# Patient Record
Sex: FEMALE | Race: OTHER | HISPANIC OR LATINO | URBAN - METROPOLITAN AREA
[De-identification: names, ages, dates, MRNs, and addresses within clinical notes are randomized per-mention and may not be internally consistent; named-entity substitution may affect disease eponyms.]

---

## 2020-04-22 ENCOUNTER — EMERGENCY (EMERGENCY)
Facility: HOSPITAL | Age: 30
LOS: 1 days | Discharge: HOME | End: 2020-04-22
Attending: EMERGENCY MEDICINE | Admitting: EMERGENCY MEDICINE
Payer: MEDICAID

## 2020-04-22 VITALS
SYSTOLIC BLOOD PRESSURE: 115 MMHG | RESPIRATION RATE: 17 BRPM | TEMPERATURE: 100 F | HEART RATE: 109 BPM | DIASTOLIC BLOOD PRESSURE: 64 MMHG | OXYGEN SATURATION: 100 %

## 2020-04-22 VITALS — TEMPERATURE: 103 F

## 2020-04-22 LAB
ALBUMIN SERPL ELPH-MCNC: 3.7 G/DL — SIGNIFICANT CHANGE UP (ref 3.3–5)
ALP SERPL-CCNC: 114 U/L — SIGNIFICANT CHANGE UP (ref 40–120)
ALT FLD-CCNC: 28 U/L — SIGNIFICANT CHANGE UP (ref 4–33)
ANION GAP SERPL CALC-SCNC: 14 MMO/L — SIGNIFICANT CHANGE UP (ref 7–14)
AST SERPL-CCNC: 18 U/L — SIGNIFICANT CHANGE UP (ref 4–32)
BASE EXCESS BLDV CALC-SCNC: -0.7 MMOL/L — SIGNIFICANT CHANGE UP
BILIRUB SERPL-MCNC: < 0.2 MG/DL — LOW (ref 0.2–1.2)
BLOOD GAS VENOUS - CREATININE: 0.62 MG/DL — SIGNIFICANT CHANGE UP (ref 0.5–1.3)
BUN SERPL-MCNC: 11 MG/DL — SIGNIFICANT CHANGE UP (ref 7–23)
CALCIUM SERPL-MCNC: 9.1 MG/DL — SIGNIFICANT CHANGE UP (ref 8.4–10.5)
CHLORIDE BLDV-SCNC: 106 MMOL/L — SIGNIFICANT CHANGE UP (ref 96–108)
CHLORIDE SERPL-SCNC: 103 MMOL/L — SIGNIFICANT CHANGE UP (ref 98–107)
CO2 SERPL-SCNC: 20 MMOL/L — LOW (ref 22–31)
CREAT SERPL-MCNC: 0.55 MG/DL — SIGNIFICANT CHANGE UP (ref 0.5–1.3)
GAS PNL BLDV: 136 MMOL/L — SIGNIFICANT CHANGE UP (ref 136–146)
GLUCOSE BLDV-MCNC: 115 MG/DL — HIGH (ref 70–99)
GLUCOSE SERPL-MCNC: 118 MG/DL — HIGH (ref 70–99)
HCO3 BLDV-SCNC: 22 MMOL/L — SIGNIFICANT CHANGE UP (ref 20–27)
HCT VFR BLDV CALC: 35.9 % — SIGNIFICANT CHANGE UP (ref 34.5–45)
HGB BLDV-MCNC: 11.7 G/DL — SIGNIFICANT CHANGE UP (ref 11.5–15.5)
LACTATE BLDV-MCNC: 1.2 MMOL/L — SIGNIFICANT CHANGE UP (ref 0.5–2)
LIDOCAIN IGE QN: 28.8 U/L — SIGNIFICANT CHANGE UP (ref 7–60)
PCO2 BLDV: 42 MMHG — SIGNIFICANT CHANGE UP (ref 41–51)
PH BLDV: 7.37 PH — SIGNIFICANT CHANGE UP (ref 7.32–7.43)
PO2 BLDV: < 24 MMHG — LOW (ref 35–40)
POTASSIUM BLDV-SCNC: 3.4 MMOL/L — SIGNIFICANT CHANGE UP (ref 3.4–4.5)
POTASSIUM SERPL-MCNC: 3.8 MMOL/L — SIGNIFICANT CHANGE UP (ref 3.5–5.3)
POTASSIUM SERPL-SCNC: 3.8 MMOL/L — SIGNIFICANT CHANGE UP (ref 3.5–5.3)
PROT SERPL-MCNC: 7.4 G/DL — SIGNIFICANT CHANGE UP (ref 6–8.3)
SAO2 % BLDV: 28.9 % — LOW (ref 60–85)
SODIUM SERPL-SCNC: 137 MMOL/L — SIGNIFICANT CHANGE UP (ref 135–145)

## 2020-04-22 PROCEDURE — 99285 EMERGENCY DEPT VISIT HI MDM: CPT

## 2020-04-22 PROCEDURE — 76770 US EXAM ABDO BACK WALL COMP: CPT | Mod: 26

## 2020-04-22 RX ORDER — SODIUM CHLORIDE 9 MG/ML
500 INJECTION, SOLUTION INTRAVENOUS ONCE
Refills: 0 | Status: COMPLETED | OUTPATIENT
Start: 2020-04-22 | End: 2020-04-22

## 2020-04-22 RX ORDER — MORPHINE SULFATE 50 MG/1
4 CAPSULE, EXTENDED RELEASE ORAL ONCE
Refills: 0 | Status: DISCONTINUED | OUTPATIENT
Start: 2020-04-22 | End: 2020-04-22

## 2020-04-22 RX ORDER — ACETAMINOPHEN 500 MG
975 TABLET ORAL ONCE
Refills: 0 | Status: COMPLETED | OUTPATIENT
Start: 2020-04-22 | End: 2020-04-22

## 2020-04-22 RX ADMIN — Medication 975 MILLIGRAM(S): at 22:16

## 2020-04-22 RX ADMIN — MORPHINE SULFATE 4 MILLIGRAM(S): 50 CAPSULE, EXTENDED RELEASE ORAL at 22:16

## 2020-04-22 RX ADMIN — SODIUM CHLORIDE 250 MILLILITER(S): 9 INJECTION, SOLUTION INTRAVENOUS at 22:16

## 2020-04-22 NOTE — ED ADULT NURSE NOTE - OBJECTIVE STATEMENT
Pt received to room 10 complaining of left rib pain. AxOx4 and ambulatory. Pt primarily Panamanian speaking. Pt states she is 29 weeks pregnant. Respirations even and unlabored. Pt febrile at this time. MD made aware. Labs drawn and sent. Pt to be transported to L&D. Will continue to monitor.

## 2020-04-22 NOTE — ED PROVIDER NOTE - CLINICAL SUMMARY MEDICAL DECISION MAKING FREE TEXT BOX
29F 29wks pregnant p/w 9/10 L flank and abdominal pain x 2 hrs. Pt appears uncomfortable, mildly tachycardic and febrile in triage. Nontoxic appearing. Lungs clear to ascultation, abdomen distended appropriately with some tenderness in aforementioned area. Pyelo vs renal stone vs pregnancy complication, plan - labs, urine, renal/bladder US, pain control, fluids, likely OB for formal TV US study.

## 2020-04-22 NOTE — ED PROVIDER NOTE - OBJECTIVE STATEMENT
29 Turkish speaking female,  29 weeks pregnant otherwise healthy, presenting with constant, abrupt onset, 9/10 pain in her L flank that radiates to her L abdomen and groin area x 2 hours. Pt says that for the last few days she's had a less severe but similar sensation on the R side. Pt had an OB ultrasound "about a month ago" that was "normal". Pt does not currently follow with an obsetrician. Denies fevers, cough, sob, chest pain, dysuria, hematuria, hx of renal stones, n/v/d, sick contacts, leg swelling, smoking.

## 2020-04-22 NOTE — ED ADULT TRIAGE NOTE - CHIEF COMPLAINT QUOTE
Patient 29 weeks pregnant c/o sharp left rib pain that radiates to lower back starting today. LMP 10/1. EDGAR 7/8. Denies any past medical history. L&D called patient to be seen in ED. 100.3F temp in triage. Patient appears uncomfortable.

## 2020-04-23 ENCOUNTER — INPATIENT (INPATIENT)
Facility: HOSPITAL | Age: 30
LOS: 4 days | Discharge: HOME | End: 2020-04-28
Attending: SPECIALIST | Admitting: SPECIALIST
Payer: MEDICAID

## 2020-04-23 VITALS
RESPIRATION RATE: 16 BRPM | SYSTOLIC BLOOD PRESSURE: 113 MMHG | TEMPERATURE: 102 F | DIASTOLIC BLOOD PRESSURE: 57 MMHG | HEART RATE: 105 BPM

## 2020-04-23 DIAGNOSIS — O26.899 OTHER SPECIFIED PREGNANCY RELATED CONDITIONS, UNSPECIFIED TRIMESTER: ICD-10-CM

## 2020-04-23 DIAGNOSIS — N20.0 CALCULUS OF KIDNEY: ICD-10-CM

## 2020-04-23 DIAGNOSIS — Z3A.00 WEEKS OF GESTATION OF PREGNANCY NOT SPECIFIED: ICD-10-CM

## 2020-04-23 LAB
ALBUMIN SERPL ELPH-MCNC: 3.3 G/DL — SIGNIFICANT CHANGE UP (ref 3.3–5)
ALP SERPL-CCNC: 112 U/L — SIGNIFICANT CHANGE UP (ref 40–120)
ALT FLD-CCNC: 26 U/L — SIGNIFICANT CHANGE UP (ref 4–33)
ANION GAP SERPL CALC-SCNC: 13 MMO/L — SIGNIFICANT CHANGE UP (ref 7–14)
APPEARANCE UR: SIGNIFICANT CHANGE UP
APTT BLD: 28.8 SEC — SIGNIFICANT CHANGE UP (ref 27.5–36.3)
AST SERPL-CCNC: 22 U/L — SIGNIFICANT CHANGE UP (ref 4–32)
BACTERIA # UR AUTO: HIGH
BASOPHILS # BLD AUTO: 0.02 K/UL — SIGNIFICANT CHANGE UP (ref 0–0.2)
BASOPHILS # BLD AUTO: 0.03 K/UL — SIGNIFICANT CHANGE UP (ref 0–0.2)
BASOPHILS NFR BLD AUTO: 0.1 % — SIGNIFICANT CHANGE UP (ref 0–2)
BASOPHILS NFR BLD AUTO: 0.2 % — SIGNIFICANT CHANGE UP (ref 0–2)
BILIRUB SERPL-MCNC: 0.3 MG/DL — SIGNIFICANT CHANGE UP (ref 0.2–1.2)
BILIRUB UR-MCNC: NEGATIVE — SIGNIFICANT CHANGE UP
BLD GP AB SCN SERPL QL: NEGATIVE — SIGNIFICANT CHANGE UP
BLOOD UR QL VISUAL: NEGATIVE — SIGNIFICANT CHANGE UP
BUN SERPL-MCNC: 6 MG/DL — LOW (ref 7–23)
CALCIUM SERPL-MCNC: 9 MG/DL — SIGNIFICANT CHANGE UP (ref 8.4–10.5)
CHLORIDE SERPL-SCNC: 103 MMOL/L — SIGNIFICANT CHANGE UP (ref 98–107)
CO2 SERPL-SCNC: 20 MMOL/L — LOW (ref 22–31)
COLOR SPEC: SIGNIFICANT CHANGE UP
CREAT SERPL-MCNC: 0.5 MG/DL — SIGNIFICANT CHANGE UP (ref 0.5–1.3)
EOSINOPHIL # BLD AUTO: 0.01 K/UL — SIGNIFICANT CHANGE UP (ref 0–0.5)
EOSINOPHIL # BLD AUTO: 0.1 K/UL — SIGNIFICANT CHANGE UP (ref 0–0.5)
EOSINOPHIL NFR BLD AUTO: 0.1 % — SIGNIFICANT CHANGE UP (ref 0–6)
EOSINOPHIL NFR BLD AUTO: 0.7 % — SIGNIFICANT CHANGE UP (ref 0–6)
GLUCOSE SERPL-MCNC: 103 MG/DL — HIGH (ref 70–99)
GLUCOSE UR-MCNC: 150 — HIGH
HBV SURFACE AG SER-ACNC: NEGATIVE — SIGNIFICANT CHANGE UP
HCT VFR BLD CALC: 30.1 % — LOW (ref 34.5–45)
HCT VFR BLD CALC: 30.8 % — LOW (ref 34.5–45)
HCT VFR BLD CALC: 32.8 % — LOW (ref 34.5–45)
HGB BLD-MCNC: 10.8 G/DL — LOW (ref 11.5–15.5)
HGB BLD-MCNC: 9.9 G/DL — LOW (ref 11.5–15.5)
HGB BLD-MCNC: 9.9 G/DL — LOW (ref 11.5–15.5)
HIV COMBO RESULT: SIGNIFICANT CHANGE UP
HIV1+2 AB SPEC QL: SIGNIFICANT CHANGE UP
HYALINE CASTS # UR AUTO: NEGATIVE — SIGNIFICANT CHANGE UP
IMM GRANULOCYTES NFR BLD AUTO: 0.6 % — SIGNIFICANT CHANGE UP (ref 0–1.5)
IMM GRANULOCYTES NFR BLD AUTO: 0.9 % — SIGNIFICANT CHANGE UP (ref 0–1.5)
INR BLD: 1 — SIGNIFICANT CHANGE UP (ref 0.88–1.17)
KETONES UR-MCNC: NEGATIVE — SIGNIFICANT CHANGE UP
LACTATE SERPL-SCNC: 1.7 MMOL/L — SIGNIFICANT CHANGE UP (ref 0.5–2)
LEUKOCYTE ESTERASE UR-ACNC: SIGNIFICANT CHANGE UP
LYMPHOCYTES # BLD AUTO: 0.47 K/UL — LOW (ref 1–3.3)
LYMPHOCYTES # BLD AUTO: 1.48 K/UL — SIGNIFICANT CHANGE UP (ref 1–3.3)
LYMPHOCYTES # BLD AUTO: 10.1 % — LOW (ref 13–44)
LYMPHOCYTES # BLD AUTO: 3 % — LOW (ref 13–44)
MCHC RBC-ENTMCNC: 31.1 PG — SIGNIFICANT CHANGE UP (ref 27–34)
MCHC RBC-ENTMCNC: 31.4 PG — SIGNIFICANT CHANGE UP (ref 27–34)
MCHC RBC-ENTMCNC: 31.5 PG — SIGNIFICANT CHANGE UP (ref 27–34)
MCHC RBC-ENTMCNC: 32.1 % — SIGNIFICANT CHANGE UP (ref 32–36)
MCHC RBC-ENTMCNC: 32.9 % — SIGNIFICANT CHANGE UP (ref 32–36)
MCHC RBC-ENTMCNC: 32.9 % — SIGNIFICANT CHANGE UP (ref 32–36)
MCV RBC AUTO: 95.3 FL — SIGNIFICANT CHANGE UP (ref 80–100)
MCV RBC AUTO: 95.9 FL — SIGNIFICANT CHANGE UP (ref 80–100)
MCV RBC AUTO: 96.9 FL — SIGNIFICANT CHANGE UP (ref 80–100)
MONOCYTES # BLD AUTO: 0.22 K/UL — SIGNIFICANT CHANGE UP (ref 0–0.9)
MONOCYTES # BLD AUTO: 0.59 K/UL — SIGNIFICANT CHANGE UP (ref 0–0.9)
MONOCYTES NFR BLD AUTO: 1.4 % — LOW (ref 2–14)
MONOCYTES NFR BLD AUTO: 4 % — SIGNIFICANT CHANGE UP (ref 2–14)
NEUTROPHILS # BLD AUTO: 12.28 K/UL — HIGH (ref 1.8–7.4)
NEUTROPHILS # BLD AUTO: 14.99 K/UL — HIGH (ref 1.8–7.4)
NEUTROPHILS NFR BLD AUTO: 84.1 % — HIGH (ref 43–77)
NEUTROPHILS NFR BLD AUTO: 94.8 % — HIGH (ref 43–77)
NITRITE UR-MCNC: NEGATIVE — SIGNIFICANT CHANGE UP
NRBC # FLD: 0 K/UL — SIGNIFICANT CHANGE UP (ref 0–0)
PH UR: 6.5 — SIGNIFICANT CHANGE UP (ref 5–8)
PLATELET # BLD AUTO: 189 K/UL — SIGNIFICANT CHANGE UP (ref 150–400)
PLATELET # BLD AUTO: 205 K/UL — SIGNIFICANT CHANGE UP (ref 150–400)
PLATELET # BLD AUTO: 245 K/UL — SIGNIFICANT CHANGE UP (ref 150–400)
PMV BLD: 11.2 FL — SIGNIFICANT CHANGE UP (ref 7–13)
PMV BLD: 11.4 FL — SIGNIFICANT CHANGE UP (ref 7–13)
PMV BLD: 11.5 FL — SIGNIFICANT CHANGE UP (ref 7–13)
POTASSIUM SERPL-MCNC: 3.9 MMOL/L — SIGNIFICANT CHANGE UP (ref 3.5–5.3)
POTASSIUM SERPL-SCNC: 3.9 MMOL/L — SIGNIFICANT CHANGE UP (ref 3.5–5.3)
PROT SERPL-MCNC: 6.7 G/DL — SIGNIFICANT CHANGE UP (ref 6–8.3)
PROT UR-MCNC: 20 — SIGNIFICANT CHANGE UP
PROTHROM AB SERPL-ACNC: 11.4 SEC — SIGNIFICANT CHANGE UP (ref 9.8–13.1)
RBC # BLD: 3.14 M/UL — LOW (ref 3.8–5.2)
RBC # BLD: 3.18 M/UL — LOW (ref 3.8–5.2)
RBC # BLD: 3.44 M/UL — LOW (ref 3.8–5.2)
RBC # FLD: 13.2 % — SIGNIFICANT CHANGE UP (ref 10.3–14.5)
RBC CASTS # UR COMP ASSIST: SIGNIFICANT CHANGE UP (ref 0–?)
RH IG SCN BLD-IMP: POSITIVE — SIGNIFICANT CHANGE UP
RH IG SCN BLD-IMP: POSITIVE — SIGNIFICANT CHANGE UP
SARS-COV-2 RNA SPEC QL NAA+PROBE: SIGNIFICANT CHANGE UP
SARS-COV-2 RNA SPEC QL NAA+PROBE: SIGNIFICANT CHANGE UP
SODIUM SERPL-SCNC: 136 MMOL/L — SIGNIFICANT CHANGE UP (ref 135–145)
SP GR SPEC: 1.02 — SIGNIFICANT CHANGE UP (ref 1–1.04)
SQUAMOUS # UR AUTO: SIGNIFICANT CHANGE UP
T PALLIDUM AB TITR SER: NEGATIVE — SIGNIFICANT CHANGE UP
UROBILINOGEN FLD QL: NORMAL — SIGNIFICANT CHANGE UP
WBC # BLD: 14.61 K/UL — HIGH (ref 3.8–10.5)
WBC # BLD: 15.77 K/UL — HIGH (ref 3.8–10.5)
WBC # BLD: 15.8 K/UL — HIGH (ref 3.8–10.5)
WBC # FLD AUTO: 14.61 K/UL — HIGH (ref 3.8–10.5)
WBC # FLD AUTO: 15.77 K/UL — HIGH (ref 3.8–10.5)
WBC # FLD AUTO: 15.8 K/UL — HIGH (ref 3.8–10.5)
WBC UR QL: SIGNIFICANT CHANGE UP (ref 0–?)

## 2020-04-23 PROCEDURE — 99223 1ST HOSP IP/OBS HIGH 75: CPT

## 2020-04-23 PROCEDURE — 71045 X-RAY EXAM CHEST 1 VIEW: CPT | Mod: 26

## 2020-04-23 RX ORDER — ACETAMINOPHEN 500 MG
1000 TABLET ORAL ONCE
Refills: 0 | Status: COMPLETED | OUTPATIENT
Start: 2020-04-23 | End: 2020-04-23

## 2020-04-23 RX ORDER — SODIUM CHLORIDE 9 MG/ML
1000 INJECTION, SOLUTION INTRAVENOUS
Refills: 0 | Status: DISCONTINUED | OUTPATIENT
Start: 2020-04-23 | End: 2020-04-24

## 2020-04-23 RX ORDER — SODIUM CHLORIDE 9 MG/ML
500 INJECTION, SOLUTION INTRAVENOUS ONCE
Refills: 0 | Status: COMPLETED | OUTPATIENT
Start: 2020-04-23 | End: 2020-04-23

## 2020-04-23 RX ORDER — ACETAMINOPHEN 500 MG
975 TABLET ORAL ONCE
Refills: 0 | Status: COMPLETED | OUTPATIENT
Start: 2020-04-23 | End: 2020-04-23

## 2020-04-23 RX ORDER — CEFTRIAXONE 500 MG/1
1000 INJECTION, POWDER, FOR SOLUTION INTRAMUSCULAR; INTRAVENOUS EVERY 24 HOURS
Refills: 0 | Status: DISCONTINUED | OUTPATIENT
Start: 2020-04-24 | End: 2020-04-24

## 2020-04-23 RX ORDER — TAMSULOSIN HYDROCHLORIDE 0.4 MG/1
0.4 CAPSULE ORAL AT BEDTIME
Refills: 0 | Status: DISCONTINUED | OUTPATIENT
Start: 2020-04-23 | End: 2020-04-28

## 2020-04-23 RX ORDER — HEPARIN SODIUM 5000 [USP'U]/ML
5000 INJECTION INTRAVENOUS; SUBCUTANEOUS EVERY 12 HOURS
Refills: 0 | Status: DISCONTINUED | OUTPATIENT
Start: 2020-04-23 | End: 2020-04-28

## 2020-04-23 RX ORDER — CEFTRIAXONE 500 MG/1
1000 INJECTION, POWDER, FOR SOLUTION INTRAMUSCULAR; INTRAVENOUS ONCE
Refills: 0 | Status: COMPLETED | OUTPATIENT
Start: 2020-04-23 | End: 2020-04-23

## 2020-04-23 RX ORDER — SODIUM CHLORIDE 9 MG/ML
1000 INJECTION, SOLUTION INTRAVENOUS
Refills: 0 | Status: DISCONTINUED | OUTPATIENT
Start: 2020-04-23 | End: 2020-04-23

## 2020-04-23 RX ORDER — CEFTRIAXONE 500 MG/1
INJECTION, POWDER, FOR SOLUTION INTRAMUSCULAR; INTRAVENOUS
Refills: 0 | Status: DISCONTINUED | OUTPATIENT
Start: 2020-04-23 | End: 2020-04-24

## 2020-04-23 RX ORDER — SODIUM CHLORIDE 9 MG/ML
1000 INJECTION, SOLUTION INTRAVENOUS ONCE
Refills: 0 | Status: COMPLETED | OUTPATIENT
Start: 2020-04-23 | End: 2020-04-23

## 2020-04-23 RX ADMIN — Medication 975 MILLIGRAM(S): at 11:46

## 2020-04-23 RX ADMIN — SODIUM CHLORIDE 125 MILLILITER(S): 9 INJECTION, SOLUTION INTRAVENOUS at 02:23

## 2020-04-23 RX ADMIN — SODIUM CHLORIDE 2000 MILLILITER(S): 9 INJECTION, SOLUTION INTRAVENOUS at 01:00

## 2020-04-23 RX ADMIN — TAMSULOSIN HYDROCHLORIDE 0.4 MILLIGRAM(S): 0.4 CAPSULE ORAL at 14:01

## 2020-04-23 RX ADMIN — SODIUM CHLORIDE 1000 MILLILITER(S): 9 INJECTION, SOLUTION INTRAVENOUS at 20:24

## 2020-04-23 RX ADMIN — CEFTRIAXONE 100 MILLIGRAM(S): 500 INJECTION, POWDER, FOR SOLUTION INTRAMUSCULAR; INTRAVENOUS at 03:17

## 2020-04-23 RX ADMIN — HEPARIN SODIUM 5000 UNIT(S): 5000 INJECTION INTRAVENOUS; SUBCUTANEOUS at 10:34

## 2020-04-23 RX ADMIN — Medication 400 MILLIGRAM(S): at 20:25

## 2020-04-23 RX ADMIN — SODIUM CHLORIDE 1000 MILLILITER(S): 9 INJECTION, SOLUTION INTRAVENOUS at 13:01

## 2020-04-23 RX ADMIN — Medication 400 MILLIGRAM(S): at 03:51

## 2020-04-23 RX ADMIN — HEPARIN SODIUM 5000 UNIT(S): 5000 INJECTION INTRAVENOUS; SUBCUTANEOUS at 22:32

## 2020-04-23 NOTE — PROGRESS NOTE ADULT - ASSESSMENT
28 yo P0 at 29w1d admitted w/ suspected pyelonephritis and confirmed nephrolithiasis, remains febrile   - although patient reports left flank pain w/ presence of stone, UA unremarkable, UCx pending, unclear if fever due to pyelo, initial COVID negative however will perform CXR and consult ID regarding management recommendations  - will continue IV ceftriaxone and repeat full panel of labs including lactate at this time  - tylenol and ice packs prn  - continue continuous fetal monitoring  - SQH for DVT prophylaxis

## 2020-04-23 NOTE — OB PROVIDER H&P - NS_GESTAGE_OBGYN_ALL_OB_FT
A: Met with patient to explain  role and to discuss aftercare options.  R: Patient currently sees PCP-Dr. Santos Hummel ad NP-Holli Sandoval.  Patient signed JOSE for both providers.  Patient is aware of aftercare options.  Patient interested in AODA IOP (mornings).  P: Will collaborate with treatment team and with the patient before setting up further aftercare, which will be complete by time of discharge.   Luna Riley LCSW  12/4/2017     29w1d

## 2020-04-23 NOTE — OB PROVIDER TRIAGE NOTE - NSOBPROVIDERNOTE_OBGYN_ALL_OB_FT
Urine Culture  Blood Culture  Covid-19 Results Pending  Chest X-Ray Pending Urine Culture  B Tylenol 100mg, IVBP  Rocephin 1Gram ordered    Admit to labor and delivery. Discussed with /Dr. Hirsch/Jaylyn:  -Full Admit see labor and delivery. See History and Physical Urine Culture, Blood Culture, Covid-19, Chest X-Ray Ordered as per Dr. Ojeda        Admit to labor and delivery. Discussed with /Dr. Hirsch/Jaylyn:  -Full Admit see labor and delivery. See History and Physical

## 2020-04-23 NOTE — OB PROVIDER TRIAGE NOTE - NSHPPHYSICALEXAM_GEN_ALL_CORE
Bp: 115/58  T:38.7  HR: 98  Abdomen: Soft, non-tender on palpation   TAUS: Anterior placenta, Transverse presentation, Bpp:8/8, JOSELINE: 19.31  NST:  with moderate variability, 15x15 accelerations, variable decelerations  [place]: No contractions Bp: 115/58  T:38.7  HR: 98  o2 Sat: 92% on room air, 98% with 02 via nasal canula   Abdomen: Soft, non-tender on palpation   TAUS: Anterior placenta, Transverse presentation, Bpp:8/8, JOSELINE: 19.31  NST:  with moderate variability, 15x15 accelerations, variable decelerations  [place]: No contractions Bp: 115/58  T:38.7  HR: 98  o2 Sat: 92% on room air, 98% with 02 via nasal canula   Lungs:     Abdomen: Soft, non-tender on palpation   TAUS: Anterior placenta, Transverse presentation, Bpp:8/8, JOSELINE: 19.31  NST:  with moderate variability, 15x15 accelerations, variable decelerations  [place]: No contractions Bp: 115/58  T:38.7  HR: 98  02 Sat: 92% on room air, 98% with 02 via nasal canula   Lungs: Clear to ascultation bilaterally. Patient denies shortness of breath. No difficulty breathing, or labored breathing noted. No use of accessory muscles. Pt does not appear to be in respiratory distress  Heart: Regular rate and rhythm   Abdomen: Soft, non-tender on palpation   TAUS: Anterior placenta, Transverse presentation, Bpp:8/8, JOSELINE: 19.31  NST:  with moderate variability, 15x15 accelerations, variable decelerations  [place]: No contractions Bp: 115/58  T:38.7  HR: 98  02 Sat: 92% on room air, 98% with 02 via nasal canula   Lungs: Clear to ascultation bilaterally. No difficulty breathing, or labored breathing noted.  Pt does not appear to be in respiratory distress  Heart: Regular rate and rhythm   Abdomen: Soft, non-tender on palpation   TAUS: Anterior placenta, Transverse presentation, Bpp:8/8, JOSELINE: 19.31  NST:  with moderate variability, 15x15 accelerations, variable decelerations  [place]: No contractions

## 2020-04-23 NOTE — PROGRESS NOTE ADULT - SUBJECTIVE AND OBJECTIVE BOX
MFM Fellow    Patient seen at bedside w/ MFM attending Dr. Solis.  She reports feeling chills, left sided flank/back pain and denies respiratory symptoms.    Vital Signs Last 24 Hrs  T(C): 39.6 (23 Apr 2020 12:38), Max: 39.6 (22 Apr 2020 23:29)  T(F): 103.28 (23 Apr 2020 12:38), Max: 103.3 (22 Apr 2020 23:29)  HR: 130 (23 Apr 2020 12:54) (81 - 181)  BP: 118/79 (23 Apr 2020 10:59) (106/58 - 138/62)  BP(mean): --  RR: 23 (23 Apr 2020 04:51) (16 - 23)  SpO2: 98% (23 Apr 2020 12:54) (86% - 100%)  GA: NAD, A+OX3  EFM: fetal tachycardia w/ moderate variability and accelerations  Abd: soft, gravid, mild tenderness on left side palpation  +left CVAT                        9.9    15.77 )-----------( 205      ( 23 Apr 2020 03:45 )             30.1   04-22    137  |  103  |  11  ----------------------------<  118<H>  3.8   |  20<L>  |  0.55    Ca    9.1      22 Apr 2020 22:30    TPro  7.4  /  Alb  3.7  /  TBili  < 0.2<L>  /  DBili  x   /  AST  18  /  ALT  28  /  AlkPhos  114  04-22    MEDICATIONS  (STANDING):  cefTRIAXone   IVPB      heparin   Injectable 5000 Unit(s) SubCutaneous every 12 hours  lactated ringers Bolus 500 milliLiter(s) IV Bolus once  lactated ringers. 1000 milliLiter(s) (125 mL/Hr) IV Continuous <Continuous>  tamsulosin 0.4 milliGRAM(s) Oral at bedtime    MEDICATIONS  (PRN):

## 2020-04-23 NOTE — OB PROVIDER H&P - HISTORY OF PRESENT ILLNESS
Default patient 31/o  Female   @29.1 weeks gestation presents to triage from ED for evaluation. Pt presented to the ED for Left Flank Pain. Bladder and Kidney ultrasound revealed 1cm Left Kidney Stone. Pt was given 975mg Tylenol and Morphine 4mg in the ED and rates current pain score 4/10. Pt was found to have a new onset temperature of 100.3F. Pt denies urinary frequency, urgency and hematuria. Pt denies any symptoms of or exposure to COVD-19. Pt states she received PNC at [facility] and was last seen one month ago. Frank Interpertuer#[id number]  Current AP course uncomplicated  Medical H/x: Denies  Surgical H/x: Denies  Ob/Gyn H/x: Denies  Meds: Pnv  NKDA

## 2020-04-23 NOTE — OB PROVIDER TRIAGE NOTE - NSHPLABSRESULTS_GEN_ALL_CORE
Urine Culture  Blood Culture  Covid-19 Results Pending  Chest X-Ray Pending Urine Culture  Blood Culture  Covid-19 Results Pending  Chest X-Ray Pending  UA   CBC Pending Urine Culture  Blood Culture  Covid-19 Results Pending  Chest X-Ray Pending  UA Pending   CBC Pending Urine Culture Pending   Blood Culture Pending  Covid-19 Results Pending  Chest X-Ray Pending  UA Pending   CBC Pending Urine Culture Pending   Blood Culture Pending  Covid-19 Results Pending  Chest X-Ray Pending

## 2020-04-23 NOTE — OB PROVIDER TRIAGE NOTE - HISTORY OF PRESENT ILLNESS
Default patient  @29.1 weeks gestation presents to triage from ED for evaluation. Pt presented to the ED for Left Flank Pain. Bladder and Kidney sonogram revealed 1cm Left Kidney Stone. Pt was given 975mg Tylenol and Morphine 4mg in the ED and rates current pain score 4/10. Pt was found to have a temperature of 100.3. Pt denies any exposure to COVD-19. Pt states she received PNC at [facility] and was last seen one month ago.   Current AP course uncomplicated  Medical H/x: Denies  Surgical H/x: Denies  Ob/Gyn H/x: Denies  Meds: Pnv  NKDA Default patient  @29.1 weeks gestation presents to triage from ED for evaluation. Pt presented to the ED for Left Flank Pain. Bladder and Kidney sonogram revealed 1cm Left Kidney Stone. Pt was given 975mg Tylenol and Morphine 4mg in the ED and rates current pain score 4/10. Pt was found to have a now onset temperature of 100.3. Pt denies any exposure to COVD-19. Pt states she received PNC at [facility] and was last seen one month ago. Brazilian Interpertuer#[id number]  Current AP course uncomplicated  Medical H/x: Denies  Surgical H/x: Denies  Ob/Gyn H/x: Denies  Meds: Pnv  NKDA Default patient  @29.1 weeks gestation presents to triage from ED for evaluation. Pt presented to the ED for Left Flank Pain. Bladder and Kidney sonogram revealed 1cm Left Kidney Stone. Pt was given 975mg Tylenol and Morphine 4mg in the ED and rates current pain score 4/10. Pt was found to have a now onset temperature of 100.3F. Pt denies urinary frequency, urgency and hematuria. Pt denies any exposure to COVD-19. Pt states she received PNC at [facility] and was last seen one month ago. Frank Interpertuer#[id number]  Current AP course uncomplicated  Medical H/x: Denies  Surgical H/x: Denies  Ob/Gyn H/x: Denies  Meds: Pnv  NKDA Default patient 31/o  Female   @29.1 weeks gestation presents to triage from ED for evaluation. Pt presented to the ED for Left Flank Pain. Bladder and Kidney sonogram revealed 1cm Left Kidney Stone. Pt was given 975mg Tylenol and Morphine 4mg in the ED and rates current pain score 4/10. Pt was found to have a new onset temperature of 100.3F. Pt denies urinary frequency, urgency and hematuria. Pt denies any exposure to COVD-19. Pt states she received PNC at [facility] and was last seen one month ago. Frank Interpertuer#[id number]  Current AP course uncomplicated  Medical H/x: Denies  Surgical H/x: Denies  Ob/Gyn H/x: Denies  Meds: Pnv  NKDA Default patient 31/o  Female   @29.1 weeks gestation presents to triage from ED for evaluation. Pt presented to the ED for Left Flank Pain. Bladder and Kidney sonogram revealed 1cm Left Kidney Stone. Pt was given 975mg Tylenol and Morphine 4mg in the ED and rates current pain score 4/10. Pt was found to have a new onset temperature of 100.3F. Pt denies urinary frequency, urgency and hematuria. Pt denies any symptoms of or exposure to COVD-19. Pt states she received PNC at [facility] and was last seen one month ago. Frank Interpertuer#[id number]  Current AP course uncomplicated  Medical H/x: Denies  Surgical H/x: Denies  Ob/Gyn H/x: Denies  Meds: Pnv  NKDA Default patient 31/o  Female   @29.1 weeks gestation presents to triage from ED for evaluation. Pt presented to the ED for Left Flank Pain. Bladder and Kidney ultrasound revealed 1cm Left Kidney Stone. Pt was given 975mg Tylenol and Morphine 4mg in the ED and rates current pain score 4/10. Pt was found to have a new onset temperature of 100.3F. Pt denies urinary frequency, urgency and hematuria. Pt denies any symptoms of or exposure to COVD-19. Pt states she received PNC at [facility] and was last seen one month ago. Frank Interpertuer#[id number]  Current AP course uncomplicated  Medical H/x: Denies  Surgical H/x: Denies  Ob/Gyn H/x: Denies  Meds: Pnv  NKDA

## 2020-04-23 NOTE — CONSULT NOTE ADULT - SUBJECTIVE AND OBJECTIVE BOX
Patient is a 29y old  Female who presents with a chief complaint of   HPI:  Default patient 29/F  Female   @29.1 weeks gestation presents to triage from ED for evaluation. Pt presented to the ED for Left Flank Pain. Bladder and Kidney ultrasound revealed 1cm Left Kidney Stone. Pt was given 975mg Tylenol and Morphine 4mg in the ED and rates current pain score 4/10. Pt was found to have a new onset temperature of 100.3F. Pt denies urinary frequency, urgency and hematuria. Pt denies any symptoms of or exposure to COVD-19. Pt states she received PNC at [facility] and was last seen one month ago. [place]ian Interpertuer#[id number]  Current AP course uncomplicated  Medical H/x: Denies  Surgical H/x: Denies  Ob/Gyn H/x: Denies  Meds: Pnv  NKDA (2020 05:04)     prior hospital charts reviewed [  ]  primary team notes reviewed [  ]  other consultant notes reviewed [  ]  PAST MEDICAL & SURGICAL HISTORY:  No pertinent past medical history  No significant past surgical history    Allergies  No Known Allergies    ANTIMICROBIALS (past 90 days)  MEDICATIONS  (STANDING):    cefTRIAXone   IVPB   100 mL/Hr IV Intermittent (20 @ 03:17)      ANTIMICROBIALS:    cefTRIAXone   IVPB      OTHER MEDS: MEDICATIONS  (STANDING):  heparin   Injectable 5000 every 12 hours  tamsulosin 0.4 at bedtime    SOCIAL HISTORY:   hx smoking  non-smoker    FAMILY HISTORY:    REVIEW OF SYSTEMS  [  ] ROS unobtainable because:    [  ] All other systems negative except as noted below:	    Constitutional:  [ ] fever [ ] chills  [ ] weight loss  [ ] weakness  Skin:  [ ] rash [ ] phlebitis	  Eyes: [ ] icterus [ ] pain  [ ] discharge	  ENMT: [ ] sore throat  [ ] thrush [ ] ulcers [ ] exudates  Respiratory: [ ] dyspnea [ ] hemoptysis [ ] cough [ ] sputum	  Cardiovascular:  [ ] chest pain [ ] palpitations [ ] edema	  Gastrointestinal:  [ ] nausea [ ] vomiting [ ] diarrhea [ ] constipation [ ] pain	  Genitourinary:  [ ] dysuria [ ] frequency [ ] hematuria [ ] discharge [ ] flank pain  [ ] incontinence  Musculoskeletal:  [ ] myalgias [ ] arthralgias [ ] arthritis  [ ] back pain  Neurological:  [ ] headache [ ] seizures  [ ] confusion/altered mental status  Psychiatric:  [ ] anxiety [ ] depression	  Hematology/Lymphatics:  [ ] lymphadenopathy  Endocrine:  [ ] adrenal [ ] thyroid  Allergic/Immunologic:	 [ ] transplant [ ] seasonal    Vital Signs Last 24 Hrs  T(F): 99.86 (20 @ 13:54), Max: 103.3 (20 @ 23:29)  Vital Signs Last 24 Hrs  HR: 126 (20 @ 14:28) (81 - 181)  BP: 117/54 (20 @ 14:03) (106/58 - 138/62)  RR: 23 (20 @ 04:51)  SpO2: 96% (20 @ 14:28) (86% - 100%)  Wt(kg): --    EXAM:  Constitutional: Not in acute distress  Eyes: pupils bilaterally reactive to light. No icterus.  Oral cavity: Clear, no lesions  Neck: No neck vein distension noted  RS: Chest clear to auscultation bilaterally. No wheeze/rhonchi/crepitations.  CVS: S1, S2 heard. Regular rate and rhythm. No murmurs/rubs/gallops.  Abdomen: Soft. No guarding/rigidity/tenderness.  : No acute abnormalities  Extremities: Warm. No pedal edema  Skin: No lesions noted  Vascular: No evidence of phlebitis  Neuro: Alert, oriented to time/place/person                          9.9    15.80 )-----------( 189      ( 2020 12:19 )             30.8     04-    136  |  103  |  6<L>  ----------------------------<  103<H>  3.9   |  20<L>  |  0.50    Ca    9.0      2020 12:19    TPro  6.7  /  Alb  3.3  /  TBili  0.3  /  DBili  x   /  AST  22  /  ALT  26  /  AlkPhos  112      Urinalysis Basic - ( 2020 03:45 )    Color: LIGHT YELLOW / Appearance: Lt TURBID / S.021 / pH: 6.5  Gluc: 150 / Ketone: NEGATIVE  / Bili: NEGATIVE / Urobili: NORMAL   Blood: NEGATIVE / Protein: 20 / Nitrite: NEGATIVE   Leuk Esterase: LARGE / RBC: 0-2 / WBC 30-50   Sq Epi: FEW / Non Sq Epi: x / Bacteria: MANY    MICROBIOLOGY:  HIV-1/2 Ag/Ab Combo (20 @ 04:30)    HIV Combo Result: NonReact    RADIOLOGY:  imaging below personally reviewed  < from: Xray Chest 1 View- PORTABLE-Urgent (20 @ 13:27) >  Normal chest radiograph.  < end of copied text >    < from: US Kidney and Bladder (20 @ 22:37) >  Nonobstructing 1.0 cm calculus in the mid left kidney. No hydronephrosis.    < end of copied text >      OTHER TESTS: [place]  #[id number]  ========  Patient is a 29y old  Female who presents with a chief complaint of   HPI:  Default patient 29/F  Female   @29.1 weeks gestation presents to triage from ED for evaluation. Pt presented to the ED for Left Flank Pain. Bladder and Kidney ultrasound revealed 1cm Left Kidney Stone. Pt was given 975mg Tylenol and Morphine 4mg in the ED and rates current pain score 4/10. Pt was found to have a new onset temperature of 100.3F. Pt denies urinary frequency, urgency and hematuria. Pt denies any symptoms of or exposure to COVD-19. Pt states she received PNC at [facility] and was last seen one month ago.  Current AP course uncomplicated  Medical H/x: Denies  Surgical H/x: Denies  Ob/Gyn H/x: Denies  Meds: Pnv  NKDA (2020 05:04)  ==========  - ID consulted for fevers  - pt assessed at bedside. Reports left flank pain f/b chills. Also reports dysuria and sense of incomplete evacuation  - Denied cough, coryza, dysuria, loose stools, recent travel, sick contacts, recent abx use    primary team notes reviewed [x]    PAST MEDICAL & SURGICAL HISTORY:  No pertinent past medical history  No significant past surgical history    Allergies  No Known Allergies    ANTIMICROBIALS (past 90 days)  MEDICATIONS  (STANDING):    cefTRIAXone   IVPB   100 mL/Hr IV Intermittent (20 @ 03:17)      ANTIMICROBIALS:    cefTRIAXone   IVPB      OTHER MEDS: MEDICATIONS  (STANDING):  heparin   Injectable 5000 every 12 hours  tamsulosin 0.4 at bedtime    SOCIAL HISTORY:  - born in [place], migrated to  5 months back  - no sick contacts at home    FAMILY HISTORY: Not pertinent    REVIEW OF SYSTEMS  [  ] ROS unobtainable because:    [x] All other systems negative except as noted below:	  Constitutional:  [ ] fever [ ] chills  [ ] weight loss  [ ] weakness  Skin:  [ ] rash [ ] phlebitis	  Eyes: [ ] icterus [ ] pain  [ ] discharge	  ENMT: [ ] sore throat  [ ] thrush [ ] ulcers [ ] exudates  Respiratory: [ ] dyspnea [ ] hemoptysis [ ] cough [ ] sputum	  Cardiovascular:  [ ] chest pain [ ] palpitations [ ] edema	  Gastrointestinal:  [ ] nausea [ ] vomiting [ ] diarrhea [ ] constipation [ ] pain	  Genitourinary:  [x] dysuria [ ] frequency [ ] hematuria [ ] discharge [x] flank pain  [ ] incontinence  Musculoskeletal:  [ ] myalgias [ ] arthralgias [ ] arthritis  [x] back pain  Neurological:  [ ] headache [ ] seizures  [ ] confusion/altered mental status  Psychiatric:  [ ] anxiety [ ] depression	  Hematology/Lymphatics:  [ ] lymphadenopathy  Endocrine:  [ ] adrenal [ ] thyroid  Allergic/Immunologic:	 [ ] transplant [ ] seasonal    Vital Signs Last 24 Hrs  T(F): 99.86 (20 @ 13:54), Max: 103.3 (20 @ 23:29)  Vital Signs Last 24 Hrs  HR: 126 (20 @ 14:28) (81 - 181)  BP: 117/54 (20 @ 14:03) (106/58 - 138/62)  RR: 23 (20 @ 04:51)  SpO2: 96% (20 @ 14:28) (86% - 100%)  Wt(kg): --    EXAM:  Constitutional: Not in acute distress. On RA  Eyes: pupils bilaterally reactive to light. No icterus.  Oral cavity: Clear, no lesions  Neck: No neck vein distension noted  RS: Chest clear to auscultation bilaterally. No wheeze/rhonchi/crepitations.  CVS: S1, S2 heard. Regular rate and rhythm. No murmurs/rubs/gallops.  Abdomen: Soft. No guarding/rigidity/tenderness. Lt CVA tenderness noted.  : No acute abnormalities  Extremities: Warm. No pedal edema  Skin: No lesions noted  Vascular: No evidence of phlebitis  Neuro: Alert, oriented to time/place/person                          9.9    15.80 )-----------( 189      ( 2020 12:19 )             30.8         136  |  103  |  6<L>  ----------------------------<  103<H>  3.9   |  20<L>  |  0.50    Ca    9.0      2020 12:19    TPro  6.7  /  Alb  3.3  /  TBili  0.3  /  DBili  x   /  AST  22  /  ALT  26  /  AlkPhos  112      Urinalysis Basic - ( 2020 03:45 )    Color: LIGHT YELLOW / Appearance: Lt TURBID / S.021 / pH: 6.5  Gluc: 150 / Ketone: NEGATIVE  / Bili: NEGATIVE / Urobili: NORMAL   Blood: NEGATIVE / Protein: 20 / Nitrite: NEGATIVE   Leuk Esterase: LARGE / RBC: 0-2 / WBC 30-50   Sq Epi: FEW / Non Sq Epi: x / Bacteria: MANY    MICROBIOLOGY:  HIV-1/2 Ag/Ab Combo (20 @ 04:30)    HIV Combo Result: NonReact    COVID-19 PCR . (20 @ 14:53)    COVID-19 PCR: NotDetec    COVID-19 PCR . (20 @ 00:18)    COVID-19 PCR: NotDetec      RADIOLOGY:  imaging below personally reviewed  < from: Xray Chest 1 View- PORTABLE-Urgent (20 @ 13:27) >  Normal chest radiograph.  < end of copied text >    < from: US Kidney and Bladder (20 @ 22:37) >  Nonobstructing 1.0 cm calculus in the mid left kidney. No hydronephrosis.  < end of copied text >      OTHER TESTS:

## 2020-04-23 NOTE — CHART NOTE - NSCHARTNOTEFT_GEN_A_CORE
NP Event note:    Patient febrile to 103.3 1 hr after tylenol given. Pt expresses chills. Denies pain at this time. Pt denies shortness of breathe, coughing. Pt also noted to be tachycardic to 120s-130s bpm, denies palpitations or heart racing.     PE:  ICU Vital Signs Last 24 Hrs  T(C): 39.6 (23 Apr 2020 12:38), Max: 39.6 (22 Apr 2020 23:29)  T(F): 103.28 (23 Apr 2020 12:38), Max: 103.3 (22 Apr 2020 23:29)  HR: 130 (23 Apr 2020 13:24) (81 - 181)  BP: 118/79 (23 Apr 2020 10:59) (106/58 - 138/62)  BP(mean): --  ABP: --  ABP(mean): --  RR: 23 (23 Apr 2020 04:51) (16 - 23)  SpO2: 99% (23 Apr 2020 13:24) (86% - 100%)  EFM: 175 moderate variability + acels no decels  [place]: none  Lungs: CTABL  CV: tachycardic    Plan:  -LR bolus 500ml  - ID consult who recommends repeat covid swab  - cont. ceftriaxone  - repeat labs  - monitor VS closely    d/w Dr. Irma Eagle [facility]

## 2020-04-23 NOTE — OB RN PATIENT PROFILE - SOURCE OF INFORMATION, OB PROFILE
Patient's Alexandria Fox is approved through insurance his copay at a retail pharmacy for 90 days is $133.77 his copay through [facility] mail order for 90 days is $401.06. Will try to apply for the [facility] program for patient.
Please work on patient assistance with Lurdes Mckeon. Patient should have been able to get a free month supply at the pharmacy as I did give them a card yesterday. Tell them they can also stop by and get samples at the office until we can get the assistance process complete.   Please contact patient's wife and update them on the situation
patient

## 2020-04-23 NOTE — CONSULT NOTE ADULT - ASSESSMENT
ASSESSMENT:    RECOMMENDATIONS:    MITRA Glasgow, MD  Fellow, Infectious Diseases  Pager: [phone]  After 5pm and on Weekends: Call [phone] ASSESSMENT:  29/F  @29.1 weeks gestation with no significant PMH/PSH.  Presented with left flank pain, chills, dysuria and sense of incomplete evacuation.  Febrile (101.1). RBUS showed left sided 1cm kidney stone without obstruction. UA positive.  ID consulted for recs.  =============  Concern for pyelonephritis (in setting of kidney stone) in pregnancy  - pt has symptoms with Left CVA tenderness. UA showed WBC 30-50, large LE.  - low clinical suspicion for Covid-19 - no symptoms, no sick contacts. On RA at this time, able to speak full sentences. Covid-19 PCR negative X2. CXR clear. Can discontinue isolation precautions    RECOMMENDATIONS:  - continue Ceftriaxone 1g/d IV  - f/u urine cx, blood cx  - can discontinue contact and airborne precautions (negative PCR X2, clear CXR)    MITRA Glasgow MD  Fellow, Infectious Diseases  Pager: [phone]  After 5pm and on Weekends: Call [phone]

## 2020-04-23 NOTE — OB PROVIDER H&P - ASSESSMENT
Default patient 30 y/o  Female   @29.1 weeks gestation presents to triage from ED for evaluation. Pt presented to the ED for Left Flank Pain. Bladder and Kidney ultrasound revealed 1cm Left Kidney Stone. Pt was given 975mg Tylenol PO and Morphine 4mg IVP in the ED and rates current pain score 4/10. Pt was found to have a new onset temperature of 100.3F. Pt denies urinary frequency, urgency and hematuria. Pt denies any symptoms of or exposure to COVD-19. Pt states she received PNC at [facility] and was last seen one month ago. [facility] Interpertuer#[id number]  Current AP course uncomplicated  Medical H/x: Denies  Surgical H/x: Denies  Ob/Gyn H/x: Denies  Meds: Pnv  NKDA    Bp: 115/58  T:38.7  HR: 98  02 Sat: 92% on room air, 98% with 02 via nasal canula   Lungs: Clear to ascultation bilaterally. No difficulty breathing, or labored breathing noted.  Pt does not appear to be in respiratory distress  Heart: Regular rate and rhythm   Abdomen: Soft, non-tender on palpation   TAUS: Anterior placenta, Transverse presentation, Bpp:8/8, JOSELINE: 19.31  NST:  with moderate variability, 15x15 accelerations, variable decelerations  [place]: No contractions    Rectal Temp @0400am: 36.7C    Evidence of Kidney Stone. R/o pyelonephritis. Discussed with /Dr. Hirsch/:  -Admit to labor and delivery  -Routine Orders Placed  -Prenatal Labs Ordered   -Urine Culture  ,Blood Culture, Urinalysis, CBC Pending  -Covid-19 Results Pending, Confirmed with Lab, specimen received   -Chest X-Ray Pending  -Tylenol 1000mg ordered IVP  -Rocephin 1Gram Ordered

## 2020-04-23 NOTE — OB PROVIDER H&P - NSHPPHYSICALEXAM_GEN_ALL_CORE
Bp: 115/58  T:38.7  HR: 98  02 Sat: 92% on room air, 98% with 02 via nasal canula   Lungs: Clear to ascultation bilaterally. No difficulty breathing, or labored breathing noted.  Pt does not appear to be in respiratory distress  Heart: Regular rate and rhythm   Abdomen: Soft, non-tender on palpation   TAUS: Anterior placenta, Transverse presentation, Bpp:8/8, JOSELINE: 19.31  NST:  with moderate variability, 15x15 accelerations, variable decelerations  [place]: No contractions

## 2020-04-23 NOTE — OB RN TRIAGE NOTE - CHIEF COMPLAINT QUOTE
I have L sided pain since 1900.,  I had no fever @ home but in the ED @ 2200 I have L sided pain since 1900.,  I had no fever @ home but in the ED @ 2200 103.0 I have L sided pain since 1900.,  I had no fever @ home but in the ED @ 2200 100.3  I feel better since they gave me medicine

## 2020-04-24 ENCOUNTER — APPOINTMENT (OUTPATIENT)
Dept: ANTEPARTUM | Facility: HOSPITAL | Age: 30
End: 2020-04-24

## 2020-04-24 ENCOUNTER — OUTPATIENT (OUTPATIENT)
Dept: OUTPATIENT SERVICES | Facility: HOSPITAL | Age: 30
LOS: 1 days | End: 2020-04-24

## 2020-04-24 ENCOUNTER — TRANSCRIPTION ENCOUNTER (OUTPATIENT)
Age: 30
End: 2020-04-24

## 2020-04-24 ENCOUNTER — ASOB RESULT (OUTPATIENT)
Age: 30
End: 2020-04-24

## 2020-04-24 LAB
ANISOCYTOSIS BLD QL: SLIGHT — SIGNIFICANT CHANGE UP
BASOPHILS # BLD AUTO: 0.01 K/UL — SIGNIFICANT CHANGE UP (ref 0–0.2)
BASOPHILS NFR BLD AUTO: 0.1 % — SIGNIFICANT CHANGE UP (ref 0–2)
BASOPHILS NFR SPEC: 0 % — SIGNIFICANT CHANGE UP (ref 0–2)
BLASTS # FLD: 0 % — SIGNIFICANT CHANGE UP (ref 0–0)
EOSINOPHIL # BLD AUTO: 0 K/UL — SIGNIFICANT CHANGE UP (ref 0–0.5)
EOSINOPHIL NFR BLD AUTO: 0 % — SIGNIFICANT CHANGE UP (ref 0–6)
EOSINOPHIL NFR FLD: 0 % — SIGNIFICANT CHANGE UP (ref 0–6)
GIANT PLATELETS BLD QL SMEAR: PRESENT — SIGNIFICANT CHANGE UP
HCT VFR BLD CALC: 26.4 % — LOW (ref 34.5–45)
HGB BLD-MCNC: 8.9 G/DL — LOW (ref 11.5–15.5)
IMM GRANULOCYTES NFR BLD AUTO: 0.6 % — SIGNIFICANT CHANGE UP (ref 0–1.5)
LYMPHOCYTES # BLD AUTO: 0.47 K/UL — LOW (ref 1–3.3)
LYMPHOCYTES # BLD AUTO: 4.3 % — LOW (ref 13–44)
LYMPHOCYTES NFR SPEC AUTO: 0.8 % — LOW (ref 13–44)
MCHC RBC-ENTMCNC: 32.6 PG — SIGNIFICANT CHANGE UP (ref 27–34)
MCHC RBC-ENTMCNC: 33.7 % — SIGNIFICANT CHANGE UP (ref 32–36)
MCV RBC AUTO: 96.7 FL — SIGNIFICANT CHANGE UP (ref 80–100)
METAMYELOCYTES # FLD: 0.9 % — SIGNIFICANT CHANGE UP (ref 0–1)
MONOCYTES # BLD AUTO: 0.31 K/UL — SIGNIFICANT CHANGE UP (ref 0–0.9)
MONOCYTES NFR BLD AUTO: 2.8 % — SIGNIFICANT CHANGE UP (ref 2–14)
MONOCYTES NFR BLD: 2.6 % — SIGNIFICANT CHANGE UP (ref 2–9)
MYELOCYTES NFR BLD: 0 % — SIGNIFICANT CHANGE UP (ref 0–0)
NEUTROPHIL AB SER-ACNC: 92.2 % — HIGH (ref 43–77)
NEUTROPHILS # BLD AUTO: 10.14 K/UL — HIGH (ref 1.8–7.4)
NEUTROPHILS NFR BLD AUTO: 92.2 % — HIGH (ref 43–77)
NEUTS BAND # BLD: 3.5 % — SIGNIFICANT CHANGE UP (ref 0–6)
NRBC # FLD: 0 K/UL — SIGNIFICANT CHANGE UP (ref 0–0)
OTHER - HEMATOLOGY %: 0 — SIGNIFICANT CHANGE UP
PLATELET # BLD AUTO: 150 K/UL — SIGNIFICANT CHANGE UP (ref 150–400)
PLATELET COUNT - ESTIMATE: NORMAL — SIGNIFICANT CHANGE UP
PMV BLD: 12.2 FL — SIGNIFICANT CHANGE UP (ref 7–13)
PROMYELOCYTES # FLD: 0 % — SIGNIFICANT CHANGE UP (ref 0–0)
RBC # BLD: 2.73 M/UL — LOW (ref 3.8–5.2)
RBC # FLD: 13.6 % — SIGNIFICANT CHANGE UP (ref 10.3–14.5)
VARIANT LYMPHS # BLD: 0 % — SIGNIFICANT CHANGE UP
WBC # BLD: 11 K/UL — HIGH (ref 3.8–10.5)
WBC # FLD AUTO: 11 K/UL — HIGH (ref 3.8–10.5)

## 2020-04-24 PROCEDURE — 99222 1ST HOSP IP/OBS MODERATE 55: CPT

## 2020-04-24 PROCEDURE — 99233 SBSQ HOSP IP/OBS HIGH 50: CPT | Mod: GC

## 2020-04-24 PROCEDURE — 99232 SBSQ HOSP IP/OBS MODERATE 35: CPT | Mod: GC

## 2020-04-24 RX ORDER — SODIUM CHLORIDE 9 MG/ML
1000 INJECTION, SOLUTION INTRAVENOUS
Refills: 0 | Status: DISCONTINUED | OUTPATIENT
Start: 2020-04-24 | End: 2020-04-24

## 2020-04-24 RX ORDER — SODIUM CHLORIDE 9 MG/ML
1000 INJECTION, SOLUTION INTRAVENOUS
Refills: 0 | Status: DISCONTINUED | OUTPATIENT
Start: 2020-04-24 | End: 2020-04-25

## 2020-04-24 RX ORDER — SODIUM CHLORIDE 9 MG/ML
500 INJECTION, SOLUTION INTRAVENOUS ONCE
Refills: 0 | Status: COMPLETED | OUTPATIENT
Start: 2020-04-24 | End: 2020-04-24

## 2020-04-24 RX ORDER — ACETAMINOPHEN 500 MG
1000 TABLET ORAL ONCE
Refills: 0 | Status: COMPLETED | OUTPATIENT
Start: 2020-04-24 | End: 2020-04-24

## 2020-04-24 RX ORDER — MEROPENEM 1 G/30ML
1000 INJECTION INTRAVENOUS EVERY 8 HOURS
Refills: 0 | Status: DISCONTINUED | OUTPATIENT
Start: 2020-04-24 | End: 2020-04-25

## 2020-04-24 RX ADMIN — SODIUM CHLORIDE 75 MILLILITER(S): 9 INJECTION, SOLUTION INTRAVENOUS at 02:17

## 2020-04-24 RX ADMIN — HEPARIN SODIUM 5000 UNIT(S): 5000 INJECTION INTRAVENOUS; SUBCUTANEOUS at 10:32

## 2020-04-24 RX ADMIN — MEROPENEM 100 MILLIGRAM(S): 1 INJECTION INTRAVENOUS at 21:57

## 2020-04-24 RX ADMIN — Medication 400 MILLIGRAM(S): at 05:57

## 2020-04-24 RX ADMIN — CEFTRIAXONE 100 MILLIGRAM(S): 500 INJECTION, POWDER, FOR SOLUTION INTRAMUSCULAR; INTRAVENOUS at 02:17

## 2020-04-24 RX ADMIN — HEPARIN SODIUM 5000 UNIT(S): 5000 INJECTION INTRAVENOUS; SUBCUTANEOUS at 21:56

## 2020-04-24 RX ADMIN — SODIUM CHLORIDE 1000 MILLILITER(S): 9 INJECTION, SOLUTION INTRAVENOUS at 17:15

## 2020-04-24 RX ADMIN — MEROPENEM 100 MILLIGRAM(S): 1 INJECTION INTRAVENOUS at 12:15

## 2020-04-24 RX ADMIN — Medication 400 MILLIGRAM(S): at 17:00

## 2020-04-24 RX ADMIN — TAMSULOSIN HYDROCHLORIDE 0.4 MILLIGRAM(S): 0.4 CAPSULE ORAL at 21:57

## 2020-04-24 RX ADMIN — SODIUM CHLORIDE 125 MILLILITER(S): 9 INJECTION, SOLUTION INTRAVENOUS at 08:00

## 2020-04-24 NOTE — DISCHARGE NOTE ANTEPARTUM - HOSPITAL COURSE
28 yo P0 at 29w2d admitted w/ suspected pyelonephritis and confirmed nephrolithiasis, remains febrile, urine culture positive, ID consulted and recommendations appreciated, increased abx coverage given persistent fevers and now on meropenem, blood culture pending and urine culture speciation pending COVID negative x 2 and CXR clear 28 yo P0 at 29w6d admitted with pyelonephritis and confirmed nephrolithiasis. Pt presented with fevers and left flank pain. Renal US 4/23 and 4/25 showing 1cm non obstructing calculus in left kidney. Pt started on Flomax.  On HD1 4/23 Chest X ray showed clear lungs. Pt with continued fevers from admission to HD4, last febrile on 4/26 ro 39.6. ID With fevers patient tachycardic to 130s-180s, MICU consulted and RRT called on 4/26. CT angio 4/26: diffuse bilateral patchy ground glass opacities. urine culture positive, with 100,000 Ecoli sensitive to ceftriaxone. ID consulted and recommendations appreciated; patient on ceftriaxone 2 g q 24 hours to transition to Keflex at home all 4 blood cultures are negative; R/O Covid-19; low suspicion and 3 negative results now. TTE done on 4/27 for previous episodes of tachycardia: EF 65%. ATU sono 4/24: Vertex/ posterior placenta/JOSELINE 16.8/ [id number] (31%)/ BPP 8/8. Patient now denies flank pain, fevers, chills, has been afebrile over 24 hours. Pt is stable for discharge home on PO keflex and for close outpatient monitoring, patient would like to follow up with OB gyn clinic at [facility], email sent for appointment.

## 2020-04-24 NOTE — PROGRESS NOTE ADULT - SUBJECTIVE AND OBJECTIVE BOX
MFM Fellow    Patient seen at bedside.  She reports her back pain has improved. She denies ctx, LOF, VB and reports normal fetal movements.    Vital Signs Last 24 Hrs  T(C): 36.5 (24 Apr 2020 10:46), Max: 39.6 (23 Apr 2020 12:38)  T(F): 97.7 (24 Apr 2020 10:46), Max: 103.28 (23 Apr 2020 12:38)  HR: 117 (24 Apr 2020 12:27) (85 - 137)  BP: 121/61 (24 Apr 2020 11:43) (90/56 - 134/76)  BP(mean): --  RR: 18 (24 Apr 2020 06:45) (16 - 18)  SpO2: 99% (24 Apr 2020 12:32) (93% - 100%)  GA: NAD, A+OX3  EFM: NST reactive for gestational age                          8.9    11.00 )-----------( 150      ( 24 Apr 2020 06:05 )             26.4   04-23    136  |  103  |  6<L>  ----------------------------<  103<H>  3.9   |  20<L>  |  0.50    Ca    9.0      23 Apr 2020 12:19    TPro  6.7  /  Alb  3.3  /  TBili  0.3  /  DBili  x   /  AST  22  /  ALT  26  /  AlkPhos  112  04-23  PT/INR - ( 22 Apr 2020 23:10 )   PT: 11.4 SEC;   INR: 1.00          PTT - ( 22 Apr 2020 23:10 )  PTT:28.8 SEC    MEDICATIONS  (STANDING):  heparin   Injectable 5000 Unit(s) SubCutaneous every 12 hours  lactated ringers. 1000 milliLiter(s) (125 mL/Hr) IV Continuous <Continuous>  meropenem  IVPB 1000 milliGRAM(s) IV Intermittent every 8 hours  tamsulosin 0.4 milliGRAM(s) Oral at bedtime    MEDICATIONS  (PRN): M Fellow    Patient seen at bedside w/ MFM attending Dr. Anna.  She reports her back pain has improved. She denies ctx, LOF, VB and reports normal fetal movements.    Vital Signs Last 24 Hrs  T(C): 36.5 (24 Apr 2020 10:46), Max: 39.6 (23 Apr 2020 12:38)  T(F): 97.7 (24 Apr 2020 10:46), Max: 103.28 (23 Apr 2020 12:38)  HR: 117 (24 Apr 2020 12:27) (85 - 137)  BP: 121/61 (24 Apr 2020 11:43) (90/56 - 134/76)  BP(mean): --  RR: 18 (24 Apr 2020 06:45) (16 - 18)  SpO2: 99% (24 Apr 2020 12:32) (93% - 100%)  GA: NAD, A+OX3  EFM: NST reactive for gestational age                          8.9    11.00 )-----------( 150      ( 24 Apr 2020 06:05 )             26.4   04-23    136  |  103  |  6<L>  ----------------------------<  103<H>  3.9   |  20<L>  |  0.50    Ca    9.0      23 Apr 2020 12:19    TPro  6.7  /  Alb  3.3  /  TBili  0.3  /  DBili  x   /  AST  22  /  ALT  26  /  AlkPhos  112  04-23  PT/INR - ( 22 Apr 2020 23:10 )   PT: 11.4 SEC;   INR: 1.00          PTT - ( 22 Apr 2020 23:10 )  PTT:28.8 SEC    MEDICATIONS  (STANDING):  heparin   Injectable 5000 Unit(s) SubCutaneous every 12 hours  lactated ringers. 1000 milliLiter(s) (125 mL/Hr) IV Continuous <Continuous>  meropenem  IVPB 1000 milliGRAM(s) IV Intermittent every 8 hours  tamsulosin 0.4 milliGRAM(s) Oral at bedtime    MEDICATIONS  (PRN):

## 2020-04-24 NOTE — CONSULT NOTE ADULT - ASSESSMENT
29 F with no pmh currently 29 weeks pregnant here with sepsis due to UTI, pyelo.  MICU consulted for tachycardia.     - improved with fever control; she has HR of 90s other times, no complaints  - agree with broadening abx   - would be cautious with IVF administration and fluid overload  - Patient does not require MICU level of care at this time.  Please re-consult as needed.

## 2020-04-24 NOTE — CONSULT NOTE ADULT - ATTENDING COMMENTS
as above
If clinically worsens would broaden antibiotics to meropenem.    Alena Ronquillo MD  Pager: [phone]  After 5 PM or weekends please call fellow on call or office [phone]

## 2020-04-24 NOTE — DISCHARGE NOTE ANTEPARTUM - MEDICATION SUMMARY - MEDICATIONS TO TAKE
I will START or STAY ON the medications listed below when I get home from the hospital:    cephalexin 500 mg oral capsule  -- 1 cap(s) by mouth 4 times a day   -- Finish all this medication unless otherwise directed by prescriber.    -- Indication: For Pyelonephritis    PNV Prenatal oral tablet  -- 1 tab(s) by mouth once a day  -- Indication: For Pregnancy    folic acid 1 mg oral tablet  -- 1 tab(s) by mouth once a day  -- Indication: For Weeks of gestation of pregnancy not specified

## 2020-04-24 NOTE — DISCHARGE NOTE ANTEPARTUM - ADDITIONAL INSTRUCTIONS
Self quarantine for 14 days from 4/23/2020:  Monitor for fevers, shortness of breath and cough primarily.  Monitor your temperature daily to not any changes and increases.   It has been determined that you no longer need hospitalization and can recover while remaining in self-quarantine at home. You should follow the prevention steps below until a healthcare provider or local or state health department says you can return to your normal activities.  1. You should restrict activities outside your home, except for getting medical care.  2. Do not go to work, school, or public areas.  3. Avoid using public transportation, ride-sharing, or taxis.  4. Separate yourself from other people and animals in your home.  5. Call ahead before visiting your doctor.  6. Wear a facemask.  7. Cover your coughs and sneezes.  8. Clean your hands often.  9. Avoid sharing personal household items.  10. Clean all “high-touch” surfaces everyday.11. Monitor your symptoms.  If you have a medical emergency and need to call 911, notify the dispatch personnel that you have COVID-19 If possible, put on a facemask before emergency medical services arrive.  12. Stopping home isolation.  Patients with confirmed COVID-19 should remain under home isolation precautions for 14 days since the positive COVID-19 test and until the risk of secondary transmission to others is thought to be low. The decision to discontinue home isolation precautions should be made on a case-by-case basis, in consultation with healthcare providers and state and local health departments. [facility] Department of Health can be reached at [phone] for further information about COVID-19.

## 2020-04-24 NOTE — DISCHARGE NOTE ANTEPARTUM - CARE PLAN
Principal Discharge DX:	Pyelonephritis  Goal:	Recovery  Assessment and plan of treatment:	- Follow up with OB doctor in one week  - Continue antibiotics as prescribed  - Return with fevers, chills, shortness of breath, increased pain  - Return with contractions, vaginal bleeding, leaking fluid or decreased fetal movement. Principal Discharge DX:	Pyelonephritis  Goal:	Recovery  Assessment and plan of treatment:	- Follow up with OB doctor in one week- Appointment made with OB Clinic at [facility] for 5/5/2020 at 830am  Please bring records from [facility] to OB clinic  - Continue antibiotics as prescribed  - Return with fevers, chills, shortness of breath, increased pain  - Return with contractions, vaginal bleeding, leaking fluid or decreased fetal movement. Principal Discharge DX:	Pyelonephritis  Goal:	Recovery  Assessment and plan of treatment:	- Follow up with OB doctor in one week- Appointment made with OB Clinic at [facility] for 5/5/2020 at 830am  Please bring records from [facility] to OB clinic  - Continue antibiotics as prescribed  - Return with fevers, chills, shortness of breath, increased pain  - Return with contractions, vaginal bleeding, leaking fluid or decreased fetal movement.  - Follow COVID precautions below

## 2020-04-24 NOTE — PROGRESS NOTE ADULT - ASSESSMENT
Pt is 28yo  at 29w2d admitted for L flank pain and fever 2/2 pyelonephritis Overnight pt was febrile, 39.6 @10pm. Pt is 28yo  at 29w2d admitted for L flank pain and fever 2/2 pyelonephritis Overnight pt was febrile, 39.6 @8pm. CXR () showed no evidence of ARDS. Pt maintaining SpO2 % on RA.

## 2020-04-24 NOTE — PROGRESS NOTE ADULT - ASSESSMENT
30 yo P0 at 29w2d admitted w/ suspected pyelonephritis and confirmed nephrolithiasis, remains febrile   - urine culture positive, ID consulted and recommendations appreciated, increased abx coverage given persistent fevers and now on meropenem, blood culture pending and urine culture speciation pending  - COVID negative x 2 and CXR clear  - fetal status overall reassuring  - SQH for DVT prophylaxis  - continue close maternal/fetal surveillance

## 2020-04-24 NOTE — DISCHARGE NOTE ANTEPARTUM - PATIENT PORTAL LINK FT
You can access the FollowMyHealth Patient Portal offered by [facility] by registering at the following website: [email or web address]. By joining [facility]’s FollowMyHealth portal, you will also be able to view your health information using other applications (apps) compatible with our system.

## 2020-04-24 NOTE — PROGRESS NOTE ADULT - ASSESSMENT
29/F  @29.1 weeks gestation with no significant PMH/PSH.  Presented with left flank pain, chills, dysuria and sense of incomplete evacuation.  Febrile (101.1). RBUS showed left sided 1cm kidney stone without obstruction. UA positive.  ID consulted for recs.    1) Pyelonephritis  - f/u UCx - 100K GNR  - c/w meropenem  - f/u BCx - neg to date    2) R/O Covid-19  - low clinical suspicion for Covid-19 - no symptoms, no sick contacts. On RA at this time, able to speak full sentences. Covid-19 PCR negative X2. CXR clear.   - Can discontinue isolation precautions

## 2020-04-24 NOTE — PROGRESS NOTE ADULT - PROBLEM SELECTOR PLAN 1
-C/w Ceftriaxone (4/23-)  -Acetaminophen prn for fever/pain  -F/u UCx  -C/w Tamsulosin  -COVID-19 neg x2   -HSQ and SCDs for DVT prophylaxis   -NST BID    RShibata, pgy3 -C/w Ceftriaxone (4/23-)  -Acetaminophen prn for fever/pain  -LR@75cc   -F/u UCx and sensitivities   -C/w Tamsulosin  -COVID-19 neg x2   -HSQ and SCDs for DVT prophylaxis   -NST BID  -PNV    RShibata, pgy3 -C/w Ceftriaxone (4/23-)  -Acetaminophen prn for fever/pain  -LR@75cc   -F/u UCx and sensitivities   -C/w Tamsulosin  -Strain urine   -COVID-19 neg x2   -HSQ and SCDs for DVT prophylaxis   -NST BID  -PNV    RShibata, pgy3 -AM labs  -C/w Ceftriaxone (4/23-)  -Acetaminophen prn for fever/pain  -LR@75cc   -F/u UCx and sensitivities. Prelim: gram neg rods  -C/w Tamsulosin  -Strain urine   -COVID-19 neg x2   -CXR (4/23)- Clear lungs. No evidence of consolidation or ARDS.   -HSQ and SCDs for DVT prophylaxis   -NST BID  -PNV    RShibata, pgy3 -AM labs  -C/w Ceftriaxone (4/23-). Pt continues to be febrile. Pt has been on Ceftriaxone for 24 hours.   -Acetaminophen prn for fever/pain  -LR@75cc   -F/u UCx and sensitivities. Prelim: gram neg rods  -C/w Tamsulosin  -Strain urine   -COVID-19 neg x2   -CXR (4/23)- Clear lungs. No evidence of consolidation or ARDS.   -HSQ and SCDs for DVT prophylaxis   -NST BID  -PNV    Plan to be further updated on AM rounds.   Elli, pgy3

## 2020-04-24 NOTE — CONSULT NOTE ADULT - SUBJECTIVE AND OBJECTIVE BOX
CHIEF COMPLAINT: fever    HPI:  29 F with no sig pmh currently 29 weeks pregnant here with fevers, sepsis due to UTI (GNR), suspected pyelonephritis.  Patient had fever ovenright and became tachycardic to 130s.  Resolved with fever control.  Patient currently denies any fevers/cp/sob/n/v/back pain/dysuria/leg swelling.    PAST MEDICAL & SURGICAL HISTORY:  No pertinent past medical history  No significant past surgical history      FAMILY HISTORY:  no pertinent history in parents    SOCIAL HISTORY:  Smoking: none  EtOH Use: none  Marital Status:  Occupation:  Recent Travel:  Country of Birth:  Advance Directives:    Allergies    No Known Allergies    Intolerances        HOME MEDICATIONS:    REVIEW OF SYSTEMS:  Constitutional:   Eyes:  ENT:  CV:  Resp:  GI:  :  MSK:  Integumentary:  Neurological:  Psychiatric:  Endocrine:  Hematologic/Lymphatic:  Allergic/Immunologic:  [ x] All other systems negative  [ ] Unable to assess ROS because ________    OBJECTIVE:  ICU Vital Signs Last 24 Hrs  T(C): 38.3 (2020 16:56), Max: 39.6 (2020 20:00)  T(F): 100.94 (2020 16:56), Max: 103.2 (2020 20:00)  HR: 118 (2020 17:49) (85 - 143)  BP: 103/56 (2020 17:33) (84/48 - 134/76)  BP(mean): --  ABP: --  ABP(mean): --  RR: 18 (2020 06:45) (16 - 18)  SpO2: 97% (2020 17:49) (87% - 100%)         @ 07:  -   @ 07:00  --------------------------------------------------------  IN: 2750 mL / OUT: 1750 mL / NET: 1000 mL     @ 07: @ 17:55  --------------------------------------------------------  IN: 1700 mL / OUT: 3050 mL / NET: -1350 mL      CAPILLARY BLOOD GLUCOSE          PHYSICAL EXAM:  General: NAD  HEENT: MMM  Lymph Nodes: no cervical LN  Neck: no JVD  Respiratory: clear bilaterally  Cardiovascular: no m/r/g  Abdomen: gravid abdomen, non tender  Extremities: no swelling  Skin: normal turgor  Neurological: no deficits  Psychiatry: normal affect    HOSPITAL MEDICATIONS:  MEDICATIONS  (STANDING):  acetaminophen  IVPB .. 1000 milliGRAM(s) IV Intermittent once  heparin   Injectable 5000 Unit(s) SubCutaneous every 12 hours  lactated ringers Bolus 500 milliLiter(s) IV Bolus once  lactated ringers. 1000 milliLiter(s) (125 mL/Hr) IV Continuous <Continuous>  meropenem  IVPB 1000 milliGRAM(s) IV Intermittent every 8 hours  tamsulosin 0.4 milliGRAM(s) Oral at bedtime    MEDICATIONS  (PRN):      LABS:                        8.9    11.00 )-----------( 150      ( 2020 06:05 )             26.4     04-    136  |  103  |  6<L>  ----------------------------<  103<H>  3.9   |  20<L>  |  0.50    Ca    9.0      2020 12:19    TPro  6.7  /  Alb  3.3  /  TBili  0.3  /  DBili  x   /  AST  22  /  ALT  26  /  AlkPhos  112  -23    PT/INR - ( 2020 23:10 )   PT: 11.4 SEC;   INR: 1.00          PTT - ( 2020 23:10 )  PTT:28.8 SEC  Urinalysis Basic - ( 2020 03:45 )    Color: LIGHT YELLOW / Appearance: Lt TURBID / S.021 / pH: 6.5  Gluc: 150 / Ketone: NEGATIVE  / Bili: NEGATIVE / Urobili: NORMAL   Blood: NEGATIVE / Protein: 20 / Nitrite: NEGATIVE   Leuk Esterase: LARGE / RBC: 0-2 / WBC 30-50   Sq Epi: FEW / Non Sq Epi: x / Bacteria: MANY        Venous Blood Gas:   @ 22:08  7.37/42/< 24/22/28.9  VBG Lactate: 1.2      MICROBIOLOGY:     RADIOLOGY:  [ ] Reviewed and interpreted by me    EKG:

## 2020-04-24 NOTE — CHART NOTE - NSCHARTNOTEFT_GEN_A_CORE
Back note from 8AM:    MICU called to evaluate patient for continued fevers and tachycardia. Per MICU attending no MICU care needed at this point, recommended to decrease amount of IV bolus given to patient and agrees to switch to meropenum as indicated in ID note.    d/w Dr. Anna and Ta IRAHETA fellow  [place] BAEZ-BC

## 2020-04-24 NOTE — DISCHARGE NOTE ANTEPARTUM - PROVIDER TOKENS
FREE:[LAST:[[facility] OB clinic],PHONE:[phone]],FAX:[(   )    -]],FREE:[LAST:[[facility] OB clinic],PHONE:[phone]],FAX:[(   )    -],ADDRESS:[[facility]  [address] [address] [place] [address]]]

## 2020-04-24 NOTE — PROGRESS NOTE ADULT - SUBJECTIVE AND OBJECTIVE BOX
Antepartum Note- HD #2    Patient seen and examined at bedside, no acute overnight events. No acute complaints.     Pt reports +FM. Denies LOF, VB, ctx, HA, epigastric pain, blurred vision, CP, SOB, N/V, fevers, and chills.    Vital Signs Last 24 Hours  T(C): 36.8 (04-24-20 @ 02:07), Max: 39.6 (04-23-20 @ 12:38)  HR: 116 (04-24-20 @ 02:07) (85 - 139)  BP: 112/75 (04-24-20 @ 02:07) (110/56 - 134/76)  RR: 16 (04-24-20 @ 02:07) (16 - 23)  SpO2: 98% (04-24-20 @ 02:07) (93% - 100%)    CAPILLARY BLOOD GLUCOSE          Physical Exam:  General: NAD  Abdomen: Soft, non-tender, gravid  Ext: No pain or swelling    Labs:             9.9    15.80 )-----------( 189      ( 04-23 @ 12:19 )             30.8     04-23 @ 12:19    136  |  103  |  6   ----------------------------<  103  3.9   |  20  |  0.50    Ca    9.0      04-23 @ 12:19    TPro  6.7  /  Alb  3.3  /  TBili  0.3  /  DBili  x   /  AST  22  /  ALT  26  /  AlkPhos  112  04-23 @ 12:19    PT/INR - ( 04-22 @ 23:10 )   PT: 11.4 SEC;   INR: 1.00     PTT - ( 04-22 @ 23:10 )  PTT:28.8 SEC        MEDICATIONS  (STANDING):  cefTRIAXone   IVPB      cefTRIAXone   IVPB 1000 milliGRAM(s) IV Intermittent every 24 hours  heparin   Injectable 5000 Unit(s) SubCutaneous every 12 hours  lactated ringers. 1000 milliLiter(s) (75 mL/Hr) IV Continuous <Continuous>  tamsulosin 0.4 milliGRAM(s) Oral at bedtime    MEDICATIONS  (PRN): Antepartum Note- HD #2    Patient seen and examined at bedside, No acute complaints. Minimal CVA tenderness. She had a fever last night, 38.6    Pt reports +FM. Denies LOF, VB, ctx, HA, epigastric pain, blurred vision, CP, SOB, N/V, fevers, and chills.    Vital Signs Last 24 Hours  T(C): 36.8 (04-24-20 @ 02:07), Max: 39.6 (04-23-20 @ 12:38)  HR: 116 (04-24-20 @ 02:07) (85 - 139)  BP: 112/75 (04-24-20 @ 02:07) (110/56 - 134/76)  RR: 16 (04-24-20 @ 02:07) (16 - 23)  SpO2: 98% (04-24-20 @ 02:07) (93% - 100%)    CAPILLARY BLOOD GLUCOSE          Physical Exam:  General: NAD  Abdomen: Soft, non-tender, gravid  MSK: No CVA tenderness  Ext: No pain or swelling    Labs:             9.9    15.80 )-----------( 189      ( 04-23 @ 12:19 )             30.8     04-23 @ 12:19    136  |  103  |  6   ----------------------------<  103  3.9   |  20  |  0.50    Ca    9.0      04-23 @ 12:19    TPro  6.7  /  Alb  3.3  /  TBili  0.3  /  DBili  x   /  AST  22  /  ALT  26  /  AlkPhos  112  04-23 @ 12:19    PT/INR - ( 04-22 @ 23:10 )   PT: 11.4 SEC;   INR: 1.00     PTT - ( 04-22 @ 23:10 )  PTT:28.8 SEC        MEDICATIONS  (STANDING):  cefTRIAXone   IVPB      cefTRIAXone   IVPB 1000 milliGRAM(s) IV Intermittent every 24 hours  heparin   Injectable 5000 Unit(s) SubCutaneous every 12 hours  lactated ringers. 1000 milliLiter(s) (75 mL/Hr) IV Continuous <Continuous>  tamsulosin 0.4 milliGRAM(s) Oral at bedtime    MEDICATIONS  (PRN): Antepartum Note- HD #2    Patient seen and examined at bedside, No acute complaints. Minimal CVA tenderness. She had a fever last night, 38.6    Pt reports +FM. Denies LOF, VB, ctx, HA, epigastric pain, blurred vision, CP, SOB, N/V, fevers, and chills.    Vital Signs Last 24 Hours  T(C): 36.8 (04-24-20 @ 02:07), Max: 39.6 (04-23-20 @ 12:38)  HR: 116 (04-24-20 @ 02:07) (85 - 139)  BP: 112/75 (04-24-20 @ 02:07) (110/56 - 134/76)  RR: 16 (04-24-20 @ 02:07) (16 - 23)  SpO2: 98% (04-24-20 @ 02:07) (93% - 100%)    CAPILLARY BLOOD GLUCOSE          Physical Exam:  General: NAD  Resp: CTAB  Abdomen: Soft, non-tender, gravid  MSK: No CVA tenderness  Ext: No pain or swelling    Labs:             9.9    15.80 )-----------( 189      ( 04-23 @ 12:19 )             30.8     04-23 @ 12:19    136  |  103  |  6   ----------------------------<  103  3.9   |  20  |  0.50    Ca    9.0      04-23 @ 12:19    TPro  6.7  /  Alb  3.3  /  TBili  0.3  /  DBili  x   /  AST  22  /  ALT  26  /  AlkPhos  112  04-23 @ 12:19    PT/INR - ( 04-22 @ 23:10 )   PT: 11.4 SEC;   INR: 1.00     PTT - ( 04-22 @ 23:10 )  PTT:28.8 SEC        MEDICATIONS  (STANDING):  cefTRIAXone   IVPB      cefTRIAXone   IVPB 1000 milliGRAM(s) IV Intermittent every 24 hours  heparin   Injectable 5000 Unit(s) SubCutaneous every 12 hours  lactated ringers. 1000 milliLiter(s) (75 mL/Hr) IV Continuous <Continuous>  tamsulosin 0.4 milliGRAM(s) Oral at bedtime    MEDICATIONS  (PRN): Antepartum Note- HD #2    Patient seen and examined at bedside, No acute complaints. Minimal CVA tenderness. She had a fever last night, 38.6. She received tyelnol and 500cc LR bolus.     Pt reports +FM. Denies LOF, VB, ctx, HA, epigastric pain, blurred vision, CP, SOB, N/V, fevers, and chills.    Vital Signs Last 24 Hours  T(C): 36.8 (04-24-20 @ 02:07), Max: 39.6 (04-23-20 @ 12:38)  HR: 116 (04-24-20 @ 02:07) (85 - 139)  BP: 112/75 (04-24-20 @ 02:07) (110/56 - 134/76)  RR: 16 (04-24-20 @ 02:07) (16 - 23)  SpO2: 98% (04-24-20 @ 02:07) (93% - 100%)    CAPILLARY BLOOD GLUCOSE          Physical Exam:  General: NAD  Resp: CTAB  Abdomen: Soft, non-tender, gravid  MSK: No CVA tenderness  Ext: No pain or swelling    Labs:             9.9    15.80 )-----------( 189      ( 04-23 @ 12:19 )             30.8     04-23 @ 12:19    136  |  103  |  6   ----------------------------<  103  3.9   |  20  |  0.50    Ca    9.0      04-23 @ 12:19    TPro  6.7  /  Alb  3.3  /  TBili  0.3  /  DBili  x   /  AST  22  /  ALT  26  /  AlkPhos  112  04-23 @ 12:19    PT/INR - ( 04-22 @ 23:10 )   PT: 11.4 SEC;   INR: 1.00     PTT - ( 04-22 @ 23:10 )  PTT:28.8 SEC        MEDICATIONS  (STANDING):  cefTRIAXone   IVPB      cefTRIAXone   IVPB 1000 milliGRAM(s) IV Intermittent every 24 hours  heparin   Injectable 5000 Unit(s) SubCutaneous every 12 hours  lactated ringers. 1000 milliLiter(s) (75 mL/Hr) IV Continuous <Continuous>  tamsulosin 0.4 milliGRAM(s) Oral at bedtime    MEDICATIONS  (PRN):

## 2020-04-24 NOTE — DISCHARGE NOTE ANTEPARTUM - PLAN OF CARE
Recovery - Follow up with OB doctor in one week  - Continue antibiotics as prescribed  - Return with fevers, chills, shortness of breath, increased pain  - Return with contractions, vaginal bleeding, leaking fluid or decreased fetal movement. - Follow up with OB doctor in one week- Appointment made with OB Clinic at [facility] for 5/5/2020 at 830am  Please bring records from [facility] to OB clinic  - Continue antibiotics as prescribed  - Return with fevers, chills, shortness of breath, increased pain  - Return with contractions, vaginal bleeding, leaking fluid or decreased fetal movement. - Follow up with OB doctor in one week- Appointment made with OB Clinic at [facility] for 5/5/2020 at 830am  Please bring records from [facility] to OB clinic  - Continue antibiotics as prescribed  - Return with fevers, chills, shortness of breath, increased pain  - Return with contractions, vaginal bleeding, leaking fluid or decreased fetal movement.  - Follow COVID precautions below

## 2020-04-24 NOTE — PROGRESS NOTE ADULT - SUBJECTIVE AND OBJECTIVE BOX
Follow Up: pyelonephritis     Interval History: Fever overnight. Feels better than yday w decreased flank pain.     Allergies  No Known Allergies        ANTIMICROBIALS:  meropenem  IVPB 1000 every 8 hours      OTHER MEDS:  acetaminophen  IVPB .. 1000 milliGRAM(s) IV Intermittent once  heparin   Injectable 5000 Unit(s) SubCutaneous every 12 hours  lactated ringers Bolus 500 milliLiter(s) IV Bolus once  lactated ringers. 1000 milliLiter(s) IV Continuous <Continuous>  tamsulosin 0.4 milliGRAM(s) Oral at bedtime      Vital Signs Last 24 Hrs  T(C): 37.1 (2020 13:37), Max: 39.6 (2020 20:00)  T(F): 98.78 (2020 13:37), Max: 103.2 (2020 20:00)  HR: 143 (2020 16:55) (85 - 143)  BP: 109/53 (2020 16:43) (84/48 - 134/76)  BP(mean): --  RR: 18 (2020 06:45) (16 - 18)  SpO2: 89% (2020 16:55) (87% - 100%)    Physical Exam:  General:    NAD,  non toxic  HEENT: atraumatic, normocephalic, normal sclera and conjunctiva  Respiratory:   breathing comfortably  abd:    pregnant  :    no  levin  Musculoskeletal:  no edema  Skin:    no rash  Neurologic:     no focal deficit  psych: normal affect                          8.9    11.00 )-----------( 150      ( 2020 06:05 )             26.4       04-23    136  |  103  |  6<L>  ----------------------------<  103<H>  3.9   |  20<L>  |  0.50    Ca    9.0      2020 12:19    TPro  6.7  /  Alb  3.3  /  TBili  0.3  /  DBili  x   /  AST  22  /  ALT  26  /  AlkPhos  112  04-23      Urinalysis Basic - ( 2020 03:45 )    Color: LIGHT YELLOW / Appearance: Lt TURBID / S.021 / pH: 6.5  Gluc: 150 / Ketone: NEGATIVE  / Bili: NEGATIVE / Urobili: NORMAL   Blood: NEGATIVE / Protein: 20 / Nitrite: NEGATIVE   Leuk Esterase: LARGE / RBC: 0-2 / WBC 30-50   Sq Epi: FEW / Non Sq Epi: x / Bacteria: MANY        MICROBIOLOGY:    .Urine Clean Catch (Midstream)  20   >100,000 CFU/ml Gram Negative Rods  Normal Urogenital vladimir present  --  --      .Blood Blood-Peripheral  20   No growth to date.  --  --    RADIOLOGY:      EXAM:  US KIDNEYS AND BLADDER        PROCEDURE DATE:  2020         INTERPRETATION:  CLINICAL INFORMATION: Left-sided flank pain    COMPARISON: None available.    TECHNIQUE: Sonography of the kidneys and bladder.     FINDINGS:    Right kidney:  10.0 cm. No renal mass, hydronephrosis or calculi.    Left kidney:  11.2 cm. A 1.0 cm shadowing calculus is present in the mid left kidney There is no hydronephrosis or renal mass.    Urinary bladder: Within normal limits.    IMPRESSION:   Nonobstructing 1.0 cm calculus in the mid left kidney. No hydronephrosis.        HERON FREDERICK M.D., RADIOLOGY RESIDENT  This document has been electronically signed.  VIKRAM ORTEZ M.D., ATTENDING RADIOLOGIST  This document has been electronically signed. 2020 11:41PM              EXAM:  XR CHEST PORTABLE URGENT 1V        PROCEDURE DATE:  2020         INTERPRETATION:  Clinical information: 29 weeks pregnant with renal calculi and fevers.    Portable AP radiograph of the chest was performed.    Comparison: None    The lungs are clear.  The heart is normal.  The soft tissues and osseous structures are unremarkable.    Impression:    Normal chest radiograph.        CRISTIAN OVALLE M.D., ATTENDING RADIOLOGIST  This document has been electronically signed. 2020  2:09PM

## 2020-04-24 NOTE — DISCHARGE NOTE ANTEPARTUM - CARE PROVIDER_API CALL
[facility] OB clinic,   Phone: [phone]  Fax: (   )    -  Follow Up Time:     [facility] clinic,   [facility]  [address]  Phone: [phone]  Fax: (   )    -  Follow Up Time:

## 2020-04-25 DIAGNOSIS — N12 TUBULO-INTERSTITIAL NEPHRITIS, NOT SPECIFIED AS ACUTE OR CHRONIC: ICD-10-CM

## 2020-04-25 LAB
-  AMIKACIN: SIGNIFICANT CHANGE UP
-  AMPICILLIN/SULBACTAM: SIGNIFICANT CHANGE UP
-  AMPICILLIN: SIGNIFICANT CHANGE UP
-  AZTREONAM: SIGNIFICANT CHANGE UP
-  CEFAZOLIN: SIGNIFICANT CHANGE UP
-  CEFEPIME: SIGNIFICANT CHANGE UP
-  CEFOXITIN: SIGNIFICANT CHANGE UP
-  CEFTRIAXONE: SIGNIFICANT CHANGE UP
-  CIPROFLOXACIN: SIGNIFICANT CHANGE UP
-  GENTAMICIN: SIGNIFICANT CHANGE UP
-  IMIPENEM: SIGNIFICANT CHANGE UP
-  LEVOFLOXACIN: SIGNIFICANT CHANGE UP
-  MEROPENEM: SIGNIFICANT CHANGE UP
-  NITROFURANTOIN: SIGNIFICANT CHANGE UP
-  PIPERACILLIN/TAZOBACTAM: SIGNIFICANT CHANGE UP
-  TIGECYCLINE: SIGNIFICANT CHANGE UP
-  TOBRAMYCIN: SIGNIFICANT CHANGE UP
-  TRIMETHOPRIM/SULFAMETHOXAZOLE: SIGNIFICANT CHANGE UP
ALBUMIN SERPL ELPH-MCNC: 2.7 G/DL — LOW (ref 3.3–5)
ALBUMIN SERPL ELPH-MCNC: 2.9 G/DL — LOW (ref 3.3–5)
ALBUMIN SERPL ELPH-MCNC: 3.2 G/DL — LOW (ref 3.3–5)
ALP SERPL-CCNC: 122 U/L — HIGH (ref 40–120)
ALP SERPL-CCNC: 130 U/L — HIGH (ref 40–120)
ALP SERPL-CCNC: 144 U/L — HIGH (ref 40–120)
ALT FLD-CCNC: 48 U/L — HIGH (ref 4–33)
ALT FLD-CCNC: 52 U/L — HIGH (ref 4–33)
ALT FLD-CCNC: 54 U/L — HIGH (ref 4–33)
ANION GAP SERPL CALC-SCNC: 13 MMO/L — SIGNIFICANT CHANGE UP (ref 7–14)
ANION GAP SERPL CALC-SCNC: 15 MMO/L — HIGH (ref 7–14)
ANION GAP SERPL CALC-SCNC: 19 MMO/L — HIGH (ref 7–14)
APTT BLD: 29.1 SEC — SIGNIFICANT CHANGE UP (ref 27.5–36.3)
AST SERPL-CCNC: 55 U/L — HIGH (ref 4–32)
AST SERPL-CCNC: 61 U/L — HIGH (ref 4–32)
AST SERPL-CCNC: 63 U/L — HIGH (ref 4–32)
BASE EXCESS BLDV CALC-SCNC: -7.5 MMOL/L — SIGNIFICANT CHANGE UP
BASOPHILS # BLD AUTO: 0.01 K/UL — SIGNIFICANT CHANGE UP (ref 0–0.2)
BASOPHILS # BLD AUTO: 0.01 K/UL — SIGNIFICANT CHANGE UP (ref 0–0.2)
BASOPHILS # BLD AUTO: 0.02 K/UL — SIGNIFICANT CHANGE UP (ref 0–0.2)
BASOPHILS NFR BLD AUTO: 0.1 % — SIGNIFICANT CHANGE UP (ref 0–2)
BASOPHILS NFR BLD AUTO: 0.1 % — SIGNIFICANT CHANGE UP (ref 0–2)
BASOPHILS NFR BLD AUTO: 0.2 % — SIGNIFICANT CHANGE UP (ref 0–2)
BILIRUB SERPL-MCNC: 0.5 MG/DL — SIGNIFICANT CHANGE UP (ref 0.2–1.2)
BILIRUB SERPL-MCNC: 0.6 MG/DL — SIGNIFICANT CHANGE UP (ref 0.2–1.2)
BILIRUB SERPL-MCNC: 0.8 MG/DL — SIGNIFICANT CHANGE UP (ref 0.2–1.2)
BUN SERPL-MCNC: 3 MG/DL — LOW (ref 7–23)
BUN SERPL-MCNC: 4 MG/DL — LOW (ref 7–23)
BUN SERPL-MCNC: 4 MG/DL — LOW (ref 7–23)
CALCIUM SERPL-MCNC: 8.5 MG/DL — SIGNIFICANT CHANGE UP (ref 8.4–10.5)
CALCIUM SERPL-MCNC: 8.6 MG/DL — SIGNIFICANT CHANGE UP (ref 8.4–10.5)
CALCIUM SERPL-MCNC: 9.1 MG/DL — SIGNIFICANT CHANGE UP (ref 8.4–10.5)
CHLORIDE SERPL-SCNC: 100 MMOL/L — SIGNIFICANT CHANGE UP (ref 98–107)
CHLORIDE SERPL-SCNC: 104 MMOL/L — SIGNIFICANT CHANGE UP (ref 98–107)
CHLORIDE SERPL-SCNC: 108 MMOL/L — HIGH (ref 98–107)
CO2 SERPL-SCNC: 13 MMOL/L — LOW (ref 22–31)
CO2 SERPL-SCNC: 15 MMOL/L — LOW (ref 22–31)
CO2 SERPL-SCNC: 16 MMOL/L — LOW (ref 22–31)
CREAT ?TM UR-MCNC: 25 MG/DL — SIGNIFICANT CHANGE UP
CREAT SERPL-MCNC: 0.44 MG/DL — LOW (ref 0.5–1.3)
CREAT SERPL-MCNC: 0.49 MG/DL — LOW (ref 0.5–1.3)
CREAT SERPL-MCNC: 0.54 MG/DL — SIGNIFICANT CHANGE UP (ref 0.5–1.3)
CULTURE RESULTS: SIGNIFICANT CHANGE UP
EOSINOPHIL # BLD AUTO: 0 K/UL — SIGNIFICANT CHANGE UP (ref 0–0.5)
EOSINOPHIL NFR BLD AUTO: 0 % — SIGNIFICANT CHANGE UP (ref 0–6)
FIBRINOGEN PPP-MCNC: 743 MG/DL — HIGH (ref 300–520)
GAS PNL BLDV: 136 MMOL/L — SIGNIFICANT CHANGE UP (ref 136–146)
GLUCOSE BLDV-MCNC: 100 MG/DL — HIGH (ref 70–99)
GLUCOSE SERPL-MCNC: 98 MG/DL — SIGNIFICANT CHANGE UP (ref 70–99)
GLUCOSE SERPL-MCNC: 98 MG/DL — SIGNIFICANT CHANGE UP (ref 70–99)
GLUCOSE SERPL-MCNC: 99 MG/DL — SIGNIFICANT CHANGE UP (ref 70–99)
HCO3 BLDV-SCNC: 18 MMOL/L — LOW (ref 20–27)
HCT VFR BLD CALC: 26.8 % — LOW (ref 34.5–45)
HCT VFR BLD CALC: 28.2 % — LOW (ref 34.5–45)
HCT VFR BLD CALC: 30.5 % — LOW (ref 34.5–45)
HCT VFR BLD CALC: 33.2 % — LOW (ref 34.5–45)
HCT VFR BLDV CALC: 29.2 % — LOW (ref 34.5–45)
HGB BLD-MCNC: 10 G/DL — LOW (ref 11.5–15.5)
HGB BLD-MCNC: 10.5 G/DL — LOW (ref 11.5–15.5)
HGB BLD-MCNC: 8.8 G/DL — LOW (ref 11.5–15.5)
HGB BLD-MCNC: 9.1 G/DL — LOW (ref 11.5–15.5)
HGB BLDV-MCNC: 9.4 G/DL — LOW (ref 11.5–15.5)
IMM GRANULOCYTES NFR BLD AUTO: 0.6 % — SIGNIFICANT CHANGE UP (ref 0–1.5)
INR BLD: 1.02 — SIGNIFICANT CHANGE UP (ref 0.88–1.17)
LACTATE SERPL-SCNC: 1.3 MMOL/L — SIGNIFICANT CHANGE UP (ref 0.5–2)
LACTATE SERPL-SCNC: 2.2 MMOL/L — HIGH (ref 0.5–2)
LACTATE SERPL-SCNC: 2.3 MMOL/L — HIGH (ref 0.5–2)
LDH SERPL L TO P-CCNC: 185 U/L — SIGNIFICANT CHANGE UP (ref 135–225)
LYMPHOCYTES # BLD AUTO: 0.39 K/UL — LOW (ref 1–3.3)
LYMPHOCYTES # BLD AUTO: 0.39 K/UL — LOW (ref 1–3.3)
LYMPHOCYTES # BLD AUTO: 0.63 K/UL — LOW (ref 1–3.3)
LYMPHOCYTES # BLD AUTO: 4.3 % — LOW (ref 13–44)
LYMPHOCYTES # BLD AUTO: 5.8 % — LOW (ref 13–44)
LYMPHOCYTES # BLD AUTO: 6.3 % — LOW (ref 13–44)
MANUAL SMEAR VERIFICATION: SIGNIFICANT CHANGE UP
MCHC RBC-ENTMCNC: 30.7 PG — SIGNIFICANT CHANGE UP (ref 27–34)
MCHC RBC-ENTMCNC: 31.3 PG — SIGNIFICANT CHANGE UP (ref 27–34)
MCHC RBC-ENTMCNC: 31.3 PG — SIGNIFICANT CHANGE UP (ref 27–34)
MCHC RBC-ENTMCNC: 31.4 PG — SIGNIFICANT CHANGE UP (ref 27–34)
MCHC RBC-ENTMCNC: 31.6 % — LOW (ref 32–36)
MCHC RBC-ENTMCNC: 32.3 % — SIGNIFICANT CHANGE UP (ref 32–36)
MCHC RBC-ENTMCNC: 32.8 % — SIGNIFICANT CHANGE UP (ref 32–36)
MCHC RBC-ENTMCNC: 32.8 % — SIGNIFICANT CHANGE UP (ref 32–36)
MCV RBC AUTO: 95.3 FL — SIGNIFICANT CHANGE UP (ref 80–100)
MCV RBC AUTO: 95.3 FL — SIGNIFICANT CHANGE UP (ref 80–100)
MCV RBC AUTO: 95.7 FL — SIGNIFICANT CHANGE UP (ref 80–100)
MCV RBC AUTO: 98.8 FL — SIGNIFICANT CHANGE UP (ref 80–100)
METHOD TYPE: SIGNIFICANT CHANGE UP
MONOCYTES # BLD AUTO: 0.13 K/UL — SIGNIFICANT CHANGE UP (ref 0–0.9)
MONOCYTES # BLD AUTO: 0.14 K/UL — SIGNIFICANT CHANGE UP (ref 0–0.9)
MONOCYTES # BLD AUTO: 0.2 K/UL — SIGNIFICANT CHANGE UP (ref 0–0.9)
MONOCYTES NFR BLD AUTO: 1.4 % — LOW (ref 2–14)
MONOCYTES NFR BLD AUTO: 1.9 % — LOW (ref 2–14)
MONOCYTES NFR BLD AUTO: 2.2 % — SIGNIFICANT CHANGE UP (ref 2–14)
NEUTROPHILS # BLD AUTO: 6.1 K/UL — SIGNIFICANT CHANGE UP (ref 1.8–7.4)
NEUTROPHILS # BLD AUTO: 8.35 K/UL — HIGH (ref 1.8–7.4)
NEUTROPHILS # BLD AUTO: 9.17 K/UL — HIGH (ref 1.8–7.4)
NEUTROPHILS NFR BLD AUTO: 91.5 % — HIGH (ref 43–77)
NEUTROPHILS NFR BLD AUTO: 91.6 % — HIGH (ref 43–77)
NEUTROPHILS NFR BLD AUTO: 92.8 % — HIGH (ref 43–77)
NRBC # FLD: 0 K/UL — SIGNIFICANT CHANGE UP (ref 0–0)
ORGANISM # SPEC MICROSCOPIC CNT: SIGNIFICANT CHANGE UP
ORGANISM # SPEC MICROSCOPIC CNT: SIGNIFICANT CHANGE UP
PCO2 BLDV: 28 MMHG — LOW (ref 41–51)
PH BLDV: 7.39 PH — SIGNIFICANT CHANGE UP (ref 7.32–7.43)
PLATELET # BLD AUTO: 130 K/UL — LOW (ref 150–400)
PLATELET # BLD AUTO: 148 K/UL — LOW (ref 150–400)
PLATELET # BLD AUTO: 160 K/UL — SIGNIFICANT CHANGE UP (ref 150–400)
PLATELET # BLD AUTO: 162 K/UL — SIGNIFICANT CHANGE UP (ref 150–400)
PMV BLD: 11.4 FL — SIGNIFICANT CHANGE UP (ref 7–13)
PMV BLD: 11.6 FL — SIGNIFICANT CHANGE UP (ref 7–13)
PMV BLD: 11.9 FL — SIGNIFICANT CHANGE UP (ref 7–13)
PMV BLD: 12.1 FL — SIGNIFICANT CHANGE UP (ref 7–13)
PO2 BLDV: 41 MMHG — HIGH (ref 35–40)
POTASSIUM BLDV-SCNC: 3.3 MMOL/L — LOW (ref 3.4–4.5)
POTASSIUM SERPL-MCNC: 3 MMOL/L — LOW (ref 3.5–5.3)
POTASSIUM SERPL-MCNC: 3.6 MMOL/L — SIGNIFICANT CHANGE UP (ref 3.5–5.3)
POTASSIUM SERPL-MCNC: 4.4 MMOL/L — SIGNIFICANT CHANGE UP (ref 3.5–5.3)
POTASSIUM SERPL-SCNC: 3 MMOL/L — LOW (ref 3.5–5.3)
POTASSIUM SERPL-SCNC: 3.6 MMOL/L — SIGNIFICANT CHANGE UP (ref 3.5–5.3)
POTASSIUM SERPL-SCNC: 4.4 MMOL/L — SIGNIFICANT CHANGE UP (ref 3.5–5.3)
PROT SERPL-MCNC: 6.2 G/DL — SIGNIFICANT CHANGE UP (ref 6–8.3)
PROT SERPL-MCNC: 6.3 G/DL — SIGNIFICANT CHANGE UP (ref 6–8.3)
PROT SERPL-MCNC: 7 G/DL — SIGNIFICANT CHANGE UP (ref 6–8.3)
PROT UR-MCNC: 19.2 MG/DL — SIGNIFICANT CHANGE UP
PROTHROM AB SERPL-ACNC: 11.7 SEC — SIGNIFICANT CHANGE UP (ref 9.8–13.1)
RBC # BLD: 2.8 M/UL — LOW (ref 3.8–5.2)
RBC # BLD: 2.96 M/UL — LOW (ref 3.8–5.2)
RBC # BLD: 3.2 M/UL — LOW (ref 3.8–5.2)
RBC # BLD: 3.36 M/UL — LOW (ref 3.8–5.2)
RBC # FLD: 13.4 % — SIGNIFICANT CHANGE UP (ref 10.3–14.5)
RBC # FLD: 13.6 % — SIGNIFICANT CHANGE UP (ref 10.3–14.5)
RBC # FLD: 13.7 % — SIGNIFICANT CHANGE UP (ref 10.3–14.5)
RBC # FLD: 13.8 % — SIGNIFICANT CHANGE UP (ref 10.3–14.5)
SAO2 % BLDV: 71.1 % — SIGNIFICANT CHANGE UP (ref 60–85)
SODIUM SERPL-SCNC: 128 MMOL/L — LOW (ref 135–145)
SODIUM SERPL-SCNC: 136 MMOL/L — SIGNIFICANT CHANGE UP (ref 135–145)
SODIUM SERPL-SCNC: 139 MMOL/L — SIGNIFICANT CHANGE UP (ref 135–145)
SPECIMEN SOURCE: SIGNIFICANT CHANGE UP
URATE SERPL-MCNC: 3.8 MG/DL — SIGNIFICANT CHANGE UP (ref 2.5–7)
WBC # BLD: 10.02 K/UL — SIGNIFICANT CHANGE UP (ref 3.8–10.5)
WBC # BLD: 6.67 K/UL — SIGNIFICANT CHANGE UP (ref 3.8–10.5)
WBC # BLD: 7.07 K/UL — SIGNIFICANT CHANGE UP (ref 3.8–10.5)
WBC # BLD: 9 K/UL — SIGNIFICANT CHANGE UP (ref 3.8–10.5)
WBC # FLD AUTO: 10.02 K/UL — SIGNIFICANT CHANGE UP (ref 3.8–10.5)
WBC # FLD AUTO: 6.67 K/UL — SIGNIFICANT CHANGE UP (ref 3.8–10.5)
WBC # FLD AUTO: 7.07 K/UL — SIGNIFICANT CHANGE UP (ref 3.8–10.5)
WBC # FLD AUTO: 9 K/UL — SIGNIFICANT CHANGE UP (ref 3.8–10.5)

## 2020-04-25 PROCEDURE — 99232 SBSQ HOSP IP/OBS MODERATE 35: CPT

## 2020-04-25 PROCEDURE — 93010 ELECTROCARDIOGRAM REPORT: CPT

## 2020-04-25 PROCEDURE — 99232 SBSQ HOSP IP/OBS MODERATE 35: CPT | Mod: GC

## 2020-04-25 PROCEDURE — 76770 US EXAM ABDO BACK WALL COMP: CPT | Mod: 26

## 2020-04-25 RX ORDER — SODIUM CHLORIDE 9 MG/ML
1000 INJECTION, SOLUTION INTRAVENOUS ONCE
Refills: 0 | Status: COMPLETED | OUTPATIENT
Start: 2020-04-25 | End: 2020-04-25

## 2020-04-25 RX ORDER — ACETAMINOPHEN 500 MG
975 TABLET ORAL ONCE
Refills: 0 | Status: COMPLETED | OUTPATIENT
Start: 2020-04-25 | End: 2020-04-25

## 2020-04-25 RX ORDER — ACETAMINOPHEN 500 MG
1000 TABLET ORAL ONCE
Refills: 0 | Status: DISCONTINUED | OUTPATIENT
Start: 2020-04-25 | End: 2020-04-25

## 2020-04-25 RX ORDER — ACETAMINOPHEN 500 MG
1000 TABLET ORAL ONCE
Refills: 0 | Status: COMPLETED | OUTPATIENT
Start: 2020-04-25 | End: 2020-04-25

## 2020-04-25 RX ORDER — MEROPENEM 1 G/30ML
1000 INJECTION INTRAVENOUS EVERY 8 HOURS
Refills: 0 | Status: DISCONTINUED | OUTPATIENT
Start: 2020-04-25 | End: 2020-04-26

## 2020-04-25 RX ORDER — MORPHINE SULFATE 50 MG/1
2 CAPSULE, EXTENDED RELEASE ORAL ONCE
Refills: 0 | Status: DISCONTINUED | OUTPATIENT
Start: 2020-04-25 | End: 2020-04-25

## 2020-04-25 RX ORDER — SODIUM CHLORIDE 9 MG/ML
1000 INJECTION INTRAMUSCULAR; INTRAVENOUS; SUBCUTANEOUS ONCE
Refills: 0 | Status: COMPLETED | OUTPATIENT
Start: 2020-04-25 | End: 2020-04-25

## 2020-04-25 RX ORDER — CEFTRIAXONE 500 MG/1
2000 INJECTION, POWDER, FOR SOLUTION INTRAMUSCULAR; INTRAVENOUS EVERY 24 HOURS
Refills: 0 | Status: DISCONTINUED | OUTPATIENT
Start: 2020-04-25 | End: 2020-04-25

## 2020-04-25 RX ORDER — FAMOTIDINE 10 MG/ML
20 INJECTION INTRAVENOUS ONCE
Refills: 0 | Status: COMPLETED | OUTPATIENT
Start: 2020-04-25 | End: 2020-04-25

## 2020-04-25 RX ORDER — SODIUM CHLORIDE 9 MG/ML
1000 INJECTION INTRAMUSCULAR; INTRAVENOUS; SUBCUTANEOUS
Refills: 0 | Status: DISCONTINUED | OUTPATIENT
Start: 2020-04-25 | End: 2020-04-26

## 2020-04-25 RX ORDER — POTASSIUM CHLORIDE 20 MEQ
40 PACKET (EA) ORAL EVERY 4 HOURS
Refills: 0 | Status: COMPLETED | OUTPATIENT
Start: 2020-04-25 | End: 2020-04-25

## 2020-04-25 RX ADMIN — Medication 400 MILLIGRAM(S): at 13:50

## 2020-04-25 RX ADMIN — Medication 40 MILLIEQUIVALENT(S): at 06:06

## 2020-04-25 RX ADMIN — FAMOTIDINE 20 MILLIGRAM(S): 10 INJECTION INTRAVENOUS at 02:52

## 2020-04-25 RX ADMIN — HEPARIN SODIUM 5000 UNIT(S): 5000 INJECTION INTRAVENOUS; SUBCUTANEOUS at 10:19

## 2020-04-25 RX ADMIN — MEROPENEM 100 MILLIGRAM(S): 1 INJECTION INTRAVENOUS at 14:36

## 2020-04-25 RX ADMIN — MORPHINE SULFATE 2 MILLIGRAM(S): 50 CAPSULE, EXTENDED RELEASE ORAL at 06:07

## 2020-04-25 RX ADMIN — Medication 400 MILLIGRAM(S): at 01:30

## 2020-04-25 RX ADMIN — Medication 975 MILLIGRAM(S): at 07:00

## 2020-04-25 RX ADMIN — SODIUM CHLORIDE 150 MILLILITER(S): 9 INJECTION INTRAMUSCULAR; INTRAVENOUS; SUBCUTANEOUS at 05:51

## 2020-04-25 RX ADMIN — MEROPENEM 100 MILLIGRAM(S): 1 INJECTION INTRAVENOUS at 23:30

## 2020-04-25 RX ADMIN — MORPHINE SULFATE 2 MILLIGRAM(S): 50 CAPSULE, EXTENDED RELEASE ORAL at 03:45

## 2020-04-25 RX ADMIN — Medication 40 MILLIEQUIVALENT(S): at 13:22

## 2020-04-25 RX ADMIN — SODIUM CHLORIDE 2000 MILLILITER(S): 9 INJECTION, SOLUTION INTRAVENOUS at 01:19

## 2020-04-25 RX ADMIN — SODIUM CHLORIDE 1000 MILLILITER(S): 9 INJECTION INTRAMUSCULAR; INTRAVENOUS; SUBCUTANEOUS at 07:00

## 2020-04-25 RX ADMIN — MEROPENEM 100 MILLIGRAM(S): 1 INJECTION INTRAVENOUS at 06:07

## 2020-04-25 NOTE — PROGRESS NOTE ADULT - PROBLEM SELECTOR PLAN 1
- urine culture positive  - Appreciate ID consult, continue meropenem  - Blood cultures: NGTD  - Urine culture: GNR (p)  - COVID negative x 2 and CXR clear  - FHR category 1  - SQH for DVT prophylaxis  - continue close maternal/fetal surveillance

## 2020-04-25 NOTE — PROGRESS NOTE ADULT - ASSESSMENT
29/F  @29.1 weeks gestation with no significant PMH/PSH.  Presented with left flank pain, chills, dysuria and sense of incomplete evacuation.  Febrile (101.1). RBUS showed left sided 1cm kidney stone without obstruction. UA positive.  ID consulted for recs.  Continues to spike fevers and be tachycardic  has been on meropenem  blood culture x2 are negative  urine culture >100K colonies ecoli S to ceftriaxone     1) Pyelonephritis  - can d/c meropenem  - start ceftriaxone 2g q24hrs   - f/u BCx - neg to date  - f/u repeat blood culture from today    2) R/O Covid-19  - low clinical suspicion for Covid-19 - no symptoms, no sick contacts. On RA at this time, able to speak full sentences. Covid-19 PCR negative X2. CXR clear.    3)Renal Stone  -consider urology consult  for stone management     Discussed with OB resident 29/F  @29.1 weeks gestation with no significant PMH/PSH.  Presented with left flank pain, chills, dysuria and sense of incomplete evacuation.  Febrile (101.1). RBUS showed left sided 1cm kidney stone without obstruction. UA positive.  ID consulted for recs.  Continues to spike fevers and be tachycardic  has been on meropenem  blood culture x2 are negative  urine culture >100K colonies ecoli S to ceftriaxone     1) Pyelonephritis  - if blood culture remain negative by tomorrow, can d/c meropenem and change to ceftriaxone 2g q24hrs   - f/u BCx - neg to date  - f/u repeat blood culture from today    2) R/O Covid-19  - low clinical suspicion for Covid-19 - no symptoms, no sick contacts. On RA at this time, able to speak full sentences. Covid-19 PCR negative X2. CXR clear.    3)Renal Stone  -consider urology consult  for stone management     Discussed with OB fellow    Please call the ID service [phone] with questions or concerns over the weekend

## 2020-04-25 NOTE — PROGRESS NOTE ADULT - ASSESSMENT
30 yo P0 at 29w3d admitted w/ suspected pyelonephritis and confirmed nephrolithiasis. Pt febrile overnight    - urine culture positive, ID consulted and recommendations appreciated, increased abx coverage given persistent fevers and now on meropenem, blood culture pending and urine culture speciation pending 30 yo P0 at 29w3d admitted w/ suspected pyelonephritis and confirmed nephrolithiasis. Pt febrile overnight to 39.5 (4/25, 1:15a) without evidence of of ARDS, clear lungs and O2 sat %on room air.

## 2020-04-25 NOTE — PROGRESS NOTE ADULT - ASSESSMENT
28 yo P0 at 29 3/7 weeks admitted w/ pyelonephritis and confirmed nephrolithiasis, remains febrile as of this morning, followed by ID, receiving meropenem x24 hours, clinically improving

## 2020-04-25 NOTE — PROGRESS NOTE ADULT - SUBJECTIVE AND OBJECTIVE BOX
Follow Up:  29F pregnant admitted with pyelo    Interval History: febrile this AM, tachy, episodes of hypoxia. At time of my exam she was comfortable, sat 97% on RA    ROS:    Unobtainable because:  All other systems negative    Constitutional: +fever, +chills  Head: no trauma  Eyes: no vision changes, no eye pain  ENT:  no sore throat, no rhinorrhea  Cardiovascular:  no chest pain, no palpitation  Respiratory:  no SOB, no cough  GI:  +abd pain, no vomiting, no diarrhea  urinary: no dysuria, no hematuria, +flank pain  musculoskeletal:  no joint pain, no joint swelling  skin:  no rash  neurology:  no headache, no seizure, no change in mental status  psych: no anxiety, no depression         Allergies  No Known Allergies        ANTIMICROBIALS:  meropenem  IVPB 1000 every 8 hours      OTHER MEDS:  acetaminophen  IVPB .. 1000 milliGRAM(s) IV Intermittent once  heparin   Injectable 5000 Unit(s) SubCutaneous every 12 hours  potassium chloride    Tablet ER 40 milliEquivalent(s) Oral every 4 hours  sodium chloride 0.9%. 1000 milliLiter(s) IV Continuous <Continuous>  tamsulosin 0.4 milliGRAM(s) Oral at bedtime      Vital Signs Last 24 Hrs  T(C): 36.7 (25 Apr 2020 10:03), Max: 39.5 (25 Apr 2020 01:15)  T(F): 98.06 (25 Apr 2020 10:03), Max: 103.1 (25 Apr 2020 01:15)  HR: 101 (25 Apr 2020 10:58) (92 - 224)  BP: 132/67 (25 Apr 2020 06:42) (84/48 - 146/92)  BP(mean): --  RR: --  SpO2: 95% (25 Apr 2020 10:58) (70% - 100%)    Physical Exam:  General:    NAD,  non toxic, A&O x 3  Head: atraumatic, normocephalic  Eye: normal sclera and conjunctiva  ENT:    no oropharyngeal lesions,   no LAD,   neck supple  Cardio:     tachycardic  Respiratory:    clear b/l,    no wheezing  abd:     soft,   BS +,   +tenderness,    pregnant abdomen  :   +CVAT, +suprapubic tenderness,   +levin  Musculoskeletal:   no joint swelling,   +edema  vascular: no lines, normal pulses  Skin:    no rash  Neurologic:     no focal deficit  psych: normal affect, no suicidal ideation    WBC Count: 6.67 K/uL (04-25 @ 07:00)  WBC Count: 9.00 K/uL (04-25 @ 02:30)  WBC Count: 11.00 K/uL (04-24 @ 06:05)  WBC Count: 15.80 K/uL (04-23 @ 12:19)  WBC Count: 15.77 K/uL (04-23 @ 03:45)  WBC Count: 14.61 K/uL (04-22 @ 21:43)                            9.1    6.67  )-----------( 130      ( 25 Apr 2020 07:00 )             28.2       04-25    128<L>  |  100  |  4<L>  ----------------------------<  98  3.0<L>   |  15<L>  |  0.44<L>    Ca    8.6      25 Apr 2020 02:30    TPro  6.2  /  Alb  2.7<L>  /  TBili  0.5  /  DBili  x   /  AST  55<H>  /  ALT  48<H>  /  AlkPhos  122<H>  04-25          Creatinine Trend: 0.44<--, 0.50<--, 0.55<--  Lactate, Blood: 2.2 mmol/L (04-25-20 @ 07:00)  Lactate, Blood: 1.3 mmol/L (04-25-20 @ 02:30)  Lactate, Blood: 1.7 mmol/L (04-23-20 @ 12:19)      MICROBIOLOGY:  v  .Blood Blood-Peripheral  04-23-20   No growth to date.  --  --    Culture - Urine (04.23.20 @ 05:06)    -  Amikacin: S <=16    -  Ampicillin: R >16 These ampicillin results predict results for amoxicillin    -  Ampicillin/Sulbactam: I 16/8 Enterobacter, Citrobacter, and Serratia may develop resistance during prolonged therapy (3-4 days)    -  Aztreonam: S <=4    -  Cefazolin: S <=8 (MIC_CL_COM_ENTERIC_CEFAZU) For uncomplicated UTI with K. pneumoniae, E. coli, or P. mirablis: SHANTEL <=16 is sensitive and SHANTEL >=32 is resistant. This also predicts results for oral agents cefaclor, cefdinir, cefpodoxime, cefprozil, cefuroxime axetil, cephalexin and locarbef for uncomplicated UTI. Note that some isolates may be susceptible to these agents while testing resistant to cefazolin.    -  Cefepime: S <=4    -  Cefoxitin: S <=8    -  Ceftriaxone: S <=1 Enterobacter, Citrobacter, and Serratia may develop resistance during prolonged therapy    -  Ciprofloxacin: S <=1    -  Gentamicin: S <=4    -  Imipenem: S <=1    -  Levofloxacin: S <=2    -  Meropenem: S <=1    -  Nitrofurantoin: S <=32 Should not be used to treat pyelonephritis    -  Piperacillin/Tazobactam: S <=16    -  Tigecycline: S <=2    -  Tobramycin: S <=4    -  Trimethoprim/Sulfamethoxazole: S <=2/38    Specimen Source: .Urine Clean Catch (Midstream)    Culture Results:   >100,000 CFU/ml Escherichia coli  Normal Urogenital vladimir present    Organism Identification: Escherichia coli    Organism: Escherichia coli    Method Type: SHANTEL        RADIOLOGY:  < from: Xray Chest 1 View- PORTABLE-Urgent (04.23.20 @ 13:27) >    Impression:    Normal chest radiograph.    < end of copied text >    < from: US Kidney and Bladder (04.22.20 @ 22:37) >  IMPRESSION:   Nonobstructing 1.0 cm calculus in the mid left kidney. No hydronephrosis.    < end of copied text > Follow Up:  29F pregnant admitted with pyelo    Interval History: febrile this AM, tachy, episodes of hypoxia. At time of my exam she was comfortable, sat 97% on RA.  levin was placed earlier today.  no pain.      ROS:    Constitutional: +fever, +chills  Head: no trauma  Eyes: no vision changes, no eye pain  ENT:  no sore throat, no rhinorrhea  Cardiovascular:  no chest pain, no palpitation  Respiratory:  no SOB, no cough  GI:  +abd pain, no vomiting, no diarrhea  urinary: no dysuria, no hematuria, +flank pain  musculoskeletal:  no joint pain, no joint swelling  skin:  no rash  neurology:  no headache, no seizure, no change in mental status  psych: no anxiety, no depression     Allergies  No Known Allergies    ANTIMICROBIALS:    meropenem  IVPB 1000 every 8 hours    OTHER MEDS:  acetaminophen  IVPB .. 1000 milliGRAM(s) IV Intermittent once  heparin   Injectable 5000 Unit(s) SubCutaneous every 12 hours  potassium chloride    Tablet ER 40 milliEquivalent(s) Oral every 4 hours  sodium chloride 0.9%. 1000 milliLiter(s) IV Continuous <Continuous>  tamsulosin 0.4 milliGRAM(s) Oral at bedtime    Vital Signs Last 24 Hrs  T(C): 36.7 (25 Apr 2020 10:03), Max: 39.5 (25 Apr 2020 01:15)  T(F): 98.06 (25 Apr 2020 10:03), Max: 103.1 (25 Apr 2020 01:15)  HR: 101 (25 Apr 2020 10:58) (92 - 224)  BP: 132/67 (25 Apr 2020 06:42) (84/48 - 146/92)  BP(mean): --  RR: --  SpO2: 95% (25 Apr 2020 10:58) (70% - 100%)    Physical Exam:  General:    NAD,  non toxic, A&O x 3  Head: atraumatic, normocephalic  Eye: normal sclera and conjunctiva  ENT:    no oropharyngeal lesions,   no LAD,   neck supple  Cardio:     tachycardic  Respiratory:    clear b/l,    no wheezing  abd:     soft,   BS +,   +tenderness,    pregnant abdomen  :   +CVAT, +suprapubic tenderness,   +levin  Musculoskeletal:   no joint swelling,   +edema  vascular: no lines, normal pulses  Skin:    no rash  Neurologic:     no focal deficit  psych: normal affect, no suicidal ideation    WBC Count: 6.67 K/uL (04-25 @ 07:00)  WBC Count: 9.00 K/uL (04-25 @ 02:30)  WBC Count: 11.00 K/uL (04-24 @ 06:05)  WBC Count: 15.80 K/uL (04-23 @ 12:19)  WBC Count: 15.77 K/uL (04-23 @ 03:45)  WBC Count: 14.61 K/uL (04-22 @ 21:43)                        9.1    6.67  )-----------( 130      ( 25 Apr 2020 07:00 )             28.2     128<L>  |  100  |  4<L>  ----------------------------<  98  3.0<L>   |  15<L>  |  0.44<L>    Ca    8.6      25 Apr 2020 02:30    TPro  6.2  /  Alb  2.7<L>  /  TBili  0.5  /  DBili  x   /  AST  55<H>  /  ALT  48<H>  /  AlkPhos  122<H>  04-25    Lactate, Blood: 2.2 mmol/L (04-25-20 @ 07:00)  Lactate, Blood: 1.3 mmol/L (04-25-20 @ 02:30)  Lactate, Blood: 1.7 mmol/L (04-23-20 @ 12:19)    MICROBIOLOGY:  .Blood Blood-Peripheral  04-23-20   No growth to date.  --  --    Culture - Urine (04.23.20 @ 05:06)    -  Amikacin: S <=16    -  Ampicillin: R >16 These ampicillin results predict results for amoxicillin    -  Ampicillin/Sulbactam: I 16/8 Enterobacter, Citrobacter, and Serratia may develop resistance during prolonged therapy (3-4 days)    -  Aztreonam: S <=4    -  Cefazolin: S <=8 (MIC_CL_COM_ENTERIC_CEFAZU) For uncomplicated UTI with K. pneumoniae, E. coli, or P. mirablis: SHANTEL <=16 is sensitive and SHANTEL >=32 is resistant. This also predicts results for oral agents cefaclor, cefdinir, cefpodoxime, cefprozil, cefuroxime axetil, cephalexin and locarbef for uncomplicated UTI. Note that some isolates may be susceptible to these agents while testing resistant to cefazolin.    -  Cefepime: S <=4    -  Cefoxitin: S <=8    -  Ceftriaxone: S <=1 Enterobacter, Citrobacter, and Serratia may develop resistance during prolonged therapy    -  Ciprofloxacin: S <=1    -  Gentamicin: S <=4    -  Imipenem: S <=1    -  Levofloxacin: S <=2    -  Meropenem: S <=1    -  Nitrofurantoin: S <=32 Should not be used to treat pyelonephritis    -  Piperacillin/Tazobactam: S <=16    -  Tigecycline: S <=2    -  Tobramycin: S <=4    -  Trimethoprim/Sulfamethoxazole: S <=2/38    Specimen Source: .Urine Clean Catch (Midstream)    Culture Results:   >100,000 CFU/ml Escherichia coli  Normal Urogenital vladimir present    Organism Identification: Escherichia coli    Organism: Escherichia coli    Method Type: SHANTEL    RADIOLOGY:  Xray Chest 1 View- PORTABLE-Urgent (04.23.20 @ 13:27) >  Impression:  Normal chest radiograph.    US Kidney and Bladder (04.22.20 @ 22:37) >  IMPRESSION:   Nonobstructing 1.0 cm calculus in the mid left kidney. No hydronephrosis.

## 2020-04-25 NOTE — PROVIDER CONTACT NOTE (OTHER) - ACTION/TREATMENT ORDERED:
IV Tylenol/Bolus
1L bolus, 1000mg Tylenol IVPB
PO Tylenol, 1L Bolus
will continue antibiotic regimen as per MD Beuser and cold packs, cold ice baths, and fluid intake, will continue to monitor

## 2020-04-25 NOTE — CHART NOTE - NSCHARTNOTEFT_GEN_A_CORE
R3 Event Note    *Citizen of [place] speaking, allowed Citizen of [place] speaking nurse to interpret    Pt evaluated at bedside for tachycardia to 160s-170s. Per vital sign review, pt febrile to 39.4 at 5:40am with intermittent episodes of hypoxia high 80s-low 90s.     Pt reports chills and rigors but denies SOB, CP, lightheadedness, N/V.    Vital Signs Last 24 Hrs  T(C): 38.6 (25 Apr 2020 06:15), Max: 39.5 (25 Apr 2020 01:15)  T(F): 101.48 (25 Apr 2020 06:15), Max: 103.1 (25 Apr 2020 01:15)  HR: 224 (25 Apr 2020 06:47) (85 - 224)  BP: 132/67 (25 Apr 2020 06:42) (84/48 - 146/92)  BP(mean): --  RR: --  SpO2: 81% (25 Apr 2020 06:47) (70% - 100%)    I&O's Detail    24 Apr 2020 07:01  -  25 Apr 2020 07:00  --------------------------------------------------------  IN:    Lactated Ringers IV Bolus: 1500 mL    lactated ringers.: 75 mL    lactated ringers.: 1250 mL    lactated ringers.: 1200 mL    Oral Fluid: 1000 mL  Total IN: 5025 mL    OUT:    Voided: 5450 mL  Total OUT: 5450 mL    Total NET: -425 mL      Exam:  CV: tachcyardic, no murmurs or gallops  Abd: gravid, soft, NT. Mild R flank pain  Pulm: decreased breath sounds at lung bases b/l. no crackles, ronchi or wheezing  Ext: no extremity edema or erythema. Nicole's sign negative                            8.8    9.00  )-----------( 148      ( 25 Apr 2020 02:30 )             26.8       04-25    128<L>  |  100  |  4<L>  ----------------------------<  98  3.0<L>   |  15<L>  |  0.44<L>    Ca    8.6      25 Apr 2020 02:30    TPro  6.2  /  Alb  2.7<L>  /  TBili  0.5  /  DBili  x   /  AST  55<H>  /  ALT  48<H>  /  AlkPhos  122<H>  04-25      CAPILLARY BLOOD GLUCOSE          LIVER FUNCTIONS - ( 25 Apr 2020 02:30 )  Alb: 2.7 g/dL / Pro: 6.2 g/dL / ALK PHOS: 122 u/L / ALT: 48 u/L / AST: 55 u/L / GGT: x    30 yo P0 at 29w3d admitted w/ suspected pyelonephritis and confirmed nephrolithiasis. Pt febrile overnight to 39.5 (4/25, 1:15a) and again 39.4 at 5:40am. Pt tachycardia to 160s-170s, with intermittent episodes of hypoxia. Lungs CTA, however concern for ARDS 2/2 pyelnonephritis. COVID neg x 2     Plan:   -Stat EKG sinus tachycardia to 160s bpm  -Tylenol 975mg PO stat for fever  -LR bolus 1000L  -MICU reconsulted to evaluate pt at bedside  -Brown catheter placed for strict I/Os  -Stat CBC, CMP, coags (for transient elevated BPs 140s-150s systolic)  -Stat lactate and VBG  -Continue to monitor VS closely    Mickie Hernandez and Monty at bedside  SCOOBY Merida PGY3 R3 Event Note    *[place] speaking, allowed [place] speaking nurse to interpret    Pt evaluated at bedside for tachycardia to 160s-170s. Per vital sign review, pt febrile to 39.4 at 5:40am with intermittent episodes of hypoxia high 80s-low 90s.     Pt reports chills and rigors but denies SOB, CP, lightheadedness, N/V.    Vital Signs Last 24 Hrs  T(C): 38.6 (25 Apr 2020 06:15), Max: 39.5 (25 Apr 2020 01:15)  T(F): 101.48 (25 Apr 2020 06:15), Max: 103.1 (25 Apr 2020 01:15)  HR: 224 (25 Apr 2020 06:47) (85 - 224)  BP: 132/67 (25 Apr 2020 06:42) (84/48 - 146/92)  BP(mean): --  RR: --  SpO2: 81% (25 Apr 2020 06:47) (70% - 100%)    I&O's Detail    24 Apr 2020 07:01  -  25 Apr 2020 07:00  --------------------------------------------------------  IN:    Lactated Ringers IV Bolus: 1500 mL    lactated ringers.: 75 mL    lactated ringers.: 1250 mL    lactated ringers.: 1200 mL    Oral Fluid: 1000 mL  Total IN: 5025 mL    OUT:    Voided: 5450 mL  Total OUT: 5450 mL    Total NET: -425 mL      Exam:  CV: tachcyardic, no murmurs or gallops  Abd: gravid, soft, NT. Mild R flank pain  Pulm: decreased breath sounds at lung bases b/l. no crackles, ronchi or wheezing  Ext: no extremity edema or erythema. Nicole's sign negative                            8.8    9.00  )-----------( 148      ( 25 Apr 2020 02:30 )             26.8       04-25    128<L>  |  100  |  4<L>  ----------------------------<  98  3.0<L>   |  15<L>  |  0.44<L>    Ca    8.6      25 Apr 2020 02:30    TPro  6.2  /  Alb  2.7<L>  /  TBili  0.5  /  DBili  x   /  AST  55<H>  /  ALT  48<H>  /  AlkPhos  122<H>  04-25      CAPILLARY BLOOD GLUCOSE          LIVER FUNCTIONS - ( 25 Apr 2020 02:30 )  Alb: 2.7 g/dL / Pro: 6.2 g/dL / ALK PHOS: 122 u/L / ALT: 48 u/L / AST: 55 u/L / GGT: x    30 yo P0 at 29w3d admitted w/ suspected pyelonephritis and confirmed nephrolithiasis. Pt febrile overnight to 39.5 (4/25, 1:15a) and again 39.4 at 5:40am. Pt tachycardia to 160s-170s, with intermittent episodes of hypoxia. Lungs CTA, however concern for ARDS 2/2 pyelnonephritis. COVID neg x 2     Plan:   -Stat EKG sinus tachycardia to 160s bpm  -Tylenol 975mg PO stat for fever  -LR bolus 1000L  -MICU reconsulted to evaluate pt at bedside  -Brown catheter placed for strict I/Os  -Stat CBC, CMP, coags (for transient elevated BPs 140s-150s systolic)  -Stat lactate and VBG  -f/u Bcx x2, Ucx 4/25. Bcx and Ucx from 4/23 pending. UCx prelim GNR - sensitivities pending  -c/w Meropenem 4/24-. status post ceftriaxone 4/23-24  -c/w tamsulosin and straining urine  -Continue to monitor VS closely    Mickie Hernandez and Monty at bedside  SCOOBY Merida PGY3 R3 Event Note    *[place] Islander speaking, allowed [place] speaking nurse to interpret    Pt evaluated at bedside for tachycardia to 160s-170s. Per vital sign review, pt febrile to 39.4 at 5:40am with intermittent episodes of hypoxia high 80s-low 90s.     Pt reports chills and rigors but denies SOB, CP, lightheadedness, N/V.    Vital Signs Last 24 Hrs  T(C): 38.6 (25 Apr 2020 06:15), Max: 39.5 (25 Apr 2020 01:15)  T(F): 101.48 (25 Apr 2020 06:15), Max: 103.1 (25 Apr 2020 01:15)  HR: 224 (25 Apr 2020 06:47) (85 - 224)  BP: 132/67 (25 Apr 2020 06:42) (84/48 - 146/92)  BP(mean): --  RR: --  SpO2: 81% (25 Apr 2020 06:47) (70% - 100%)    I&O's Detail    24 Apr 2020 07:01  -  25 Apr 2020 07:00  --------------------------------------------------------  IN:    Lactated Ringers IV Bolus: 1500 mL    lactated ringers.: 75 mL    lactated ringers.: 1250 mL    lactated ringers.: 1200 mL    Oral Fluid: 1000 mL  Total IN: 5025 mL    OUT:    Voided: 5450 mL  Total OUT: 5450 mL    Total NET: -425 mL      Exam:  CV: tachcyardic, no murmurs or gallops  Abd: gravid, soft, NT. Mild R flank pain  Pulm: decreased breath sounds at lung bases b/l. no crackles, ronchi or wheezing  Ext: no extremity edema or erythema. Nicole's sign negative                            8.8    9.00  )-----------( 148      ( 25 Apr 2020 02:30 )             26.8       04-25    128<L>  |  100  |  4<L>  ----------------------------<  98  3.0<L>   |  15<L>  |  0.44<L>    Ca    8.6      25 Apr 2020 02:30    TPro  6.2  /  Alb  2.7<L>  /  TBili  0.5  /  DBili  x   /  AST  55<H>  /  ALT  48<H>  /  AlkPhos  122<H>  04-25      CAPILLARY BLOOD GLUCOSE          LIVER FUNCTIONS - ( 25 Apr 2020 02:30 )  Alb: 2.7 g/dL / Pro: 6.2 g/dL / ALK PHOS: 122 u/L / ALT: 48 u/L / AST: 55 u/L / GGT: x    30 yo P0 at 29w3d admitted w/ suspected pyelonephritis and confirmed nephrolithiasis. Pt febrile overnight to 39.5 (4/25, 1:15a) and again 39.4 at 5:40am. Pt tachycardia to 160s-170s, with intermittent episodes of hypoxia. Lungs CTA, however concern for ARDS 2/2 pyelnonephritis. COVID neg x 2     Plan:   -Stat EKG sinus tachycardia to 160s bpm  -Tylenol 975mg PO stat for fever  -LR bolus 1000L  -MICU reconsulted to evaluate pt at bedside  -Brown catheter placed for strict I/Os  -Stat CBC, CMP, coags (for transient elevated BPs 140s-150s systolic)  -Stat lactate and VBG  -f/u Bcx x2, Ucx 4/25. Bcx and Ucx from 4/23 pending. UCx prelim GNR - sensitivities pending  -c/w Meropenem 4/24-. status post ceftriaxone 4/23-24  -c/w tamsulosin and straining urine  -Continue to monitor VS closely    Mickie Hernandez and Monty at bedside  SCOOBY Merida PGY3    Pt seen and examined agree with above resident note.  MICU again called to evaluate pt.  EKG - sinus tachycardia.  O2 sat improved when we were bedside and maternal tachycardia improving sp tylenol.  Will await labs and continue supportive care. Follow up final urine culture.  Brown reinserted.  Continue meropenem.    Madeline hernandez MD

## 2020-04-25 NOTE — CHART NOTE - NSCHARTNOTEFT_GEN_A_CORE
R3 Antepartum Event Note    Pt evaluated at bedside for tachcyardia to the 140s bpm in the setting of fever of 38.5. Pt c/o rigors and chills. While pt is shaking, spO2 decreases to low 90s, pt saturates % on RA when not shaking. Denies CP, SOB, lightheadedness, dizziness.    Plan:  -IV tylenol stat  -C/w meropenem  -tachycardia 2/2 fever  -If tachycardia does not resolve when afebrile, consider CTA to r/o PE  -covid x 2 negative  -AM CBC/CMP/lactate    D/w Dr. David Merida PGY3

## 2020-04-25 NOTE — PROGRESS NOTE ADULT - PROBLEM SELECTOR PLAN 1
-stat 1L LR bolus and IV Tyelnol. c/w tylenol prn for fever/pain  -stat CBC with diff, CMP, lactate  -stat Bcx x2, Ucx, to be drawn. Bcx and Ucx from 4/23 pending. UCx prelim GNR - sensitivities penidng  -Appreciate ID recs. c/w Meropenem 4/24-. status post ceftriaxone 4/23-24. Increased abx coverage given persistent fevers and now on meropenem, blood culture pending and urine culture speciation pending  -LR@150  -c/w tamsulosin and straining urine  -covid neg x2  -CXR 4/23 clear lungs. no conslidiation, no signs of ARDs  -c/w HSQ and SCDs for DVT ppx   -NST bid. Pt placed back on monitor after fever.   -PNV     d/w Dr. Braun-Ramon Aguila PGY3

## 2020-04-25 NOTE — RAPID RESPONSE TEAM SUMMARY - NSSITUATIONBACKGROUNDRRT_GEN_ALL_CORE
29 year old gravid female 29 weeks admitted for pyelonephritis. Abx escalated to meropenem on the 23rd. RRT called for tachycardia to 180s. Pt febrile to 101.4. IV tylenol given. Initially concerned for SVT but EKG showing sinus tachycardia. Did not give adenosine. Patient did not desaturate while we were there. Obtained CXR that was concerning for fluid overload vs COVID19 infx. Given CXR completely clear on the 23rd, less likely that COVID changes occurred so quickly, but still possible. Other things to consider would be cardiomyopathy 2/2 covid or pregnancy, or fluid overload. Would give  Lasix 40mg, as relayed to primary team. I asked MICU to come and take a look at patient and ultrasound her heart to assess for any structural cardiac changes.  All of this was conferred to the primary team, as well as to add pro-BNP. Would hold off on further IVF.

## 2020-04-25 NOTE — PROGRESS NOTE ADULT - SUBJECTIVE AND OBJECTIVE BOX
R3 Antepartum Note-HD#3    RN called to report that pt was febrile to 39.5 at 1:15am stat IV tylenol and 1L bolus ordered for patient.     Patient seen and examined at bedside in PACU. Patient complained of fever/chills and left flank pain with radiation to groin. Endorses GERD symptoms of epigastric burning which she has had in the past. Denies CP, SOB, nausea/vomiting, HA, blurry vision, RUQ pain, new onset swelling.    Patient  reports +FM, denies LOF, VB, ctx.      Vital Signs Last 24 Hours  T(C): 39.5 (04-25-20 @ 01:15), Max: 39.5 (04-25-20 @ 01:15)  HR: 143 (04-25-20 @ 01:34) (85 - 143)  BP: 134/68 (04-25-20 @ 01:32) (84/48 - 139/80)  RR: 18 (04-24-20 @ 06:45) (16 - 18)  SpO2: 94% (04-25-20 @ 01:34) (87% - 100%)    CAPILLARY BLOOD GLUCOSE    Physical Exam:  General: NAD  CV: tachycardic, s1 and s2 noted   Resp: CTAB, no wheezes/rales/ronchi  Abdomen: Soft, gravid, non-tender, no ruq/epigastric pain  Ext: No pain or swelling    Labs:             8.9    11.00 )-----------( 150      ( 04-24 @ 06:05 )             26.4     04-23 @ 12:19    136  |  103  |  6   ----------------------------<  103  3.9   |  20  |  0.50    Ca    9.0      04-23 @ 12:19    TPro  6.7  /  Alb  3.3  /  TBili  0.3  /  DBili  x   /  AST  22  /  ALT  26  /  AlkPhos  112  04-23 @ 12:19    PT/INR - ( 04-22 @ 23:10 )   PT: 11.4 SEC;   INR: 1.00     PTT - ( 04-22 @ 23:10 )  PTT:28.8 SEC        MEDICATIONS  (STANDING):  acetaminophen  IVPB .. 1000 milliGRAM(s) IV Intermittent once  famotidine Injectable 20 milliGRAM(s) IV Push once  heparin   Injectable 5000 Unit(s) SubCutaneous every 12 hours  lactated ringers. 1000 milliLiter(s) (150 mL/Hr) IV Continuous <Continuous>  meropenem  IVPB 1000 milliGRAM(s) IV Intermittent every 8 hours  tamsulosin 0.4 milliGRAM(s) Oral at bedtime    MEDICATIONS  (PRN):

## 2020-04-25 NOTE — PROVIDER CONTACT NOTE (CHANGE IN STATUS NOTIFICATION) - ACTION/TREATMENT ORDERED:
As per MD Beuser, will assist patient in reducing shivering by providing blankets and take rectal temp (patient 36.3 rectally), and will draw labs as per MD, will continue to monitor

## 2020-04-25 NOTE — PROVIDER CONTACT NOTE (OTHER) - BACKGROUND
@ 29.3 weeks due , admitted for c/o L flank pain, r/o pyelonephritis, negative medsurg & ob history

## 2020-04-25 NOTE — PROGRESS NOTE ADULT - SUBJECTIVE AND OBJECTIVE BOX
Patient seen and evaluated with resident.    30 yo P0 at 29 3/7 weeks admitted w/ pyelonephritis and confirmed nephrolithiasis, remains febrile as of this morning, followed by ID, receiving meropenem x24 hours, clinically improving. She is COVID-19 negative x2 (4/23/20). Denies CP/SOB, N/V, contractions, leaking, vaginal bleeding. She reports good fetal movement.    Abd-soft, nt, gravid  Ext-nt b/l

## 2020-04-26 DIAGNOSIS — Z29.9 ENCOUNTER FOR PROPHYLACTIC MEASURES, UNSPECIFIED: ICD-10-CM

## 2020-04-26 LAB
ALBUMIN SERPL ELPH-MCNC: 2.8 G/DL — LOW (ref 3.3–5)
ALBUMIN SERPL ELPH-MCNC: 3.3 G/DL — SIGNIFICANT CHANGE UP (ref 3.3–5)
ALP SERPL-CCNC: 136 U/L — HIGH (ref 40–120)
ALP SERPL-CCNC: 161 U/L — HIGH (ref 40–120)
ALT FLD-CCNC: 51 U/L — HIGH (ref 4–33)
ALT FLD-CCNC: 60 U/L — HIGH (ref 4–33)
AMNISURE ROM (RUPTURE OF MEMBRANES): NEGATIVE — SIGNIFICANT CHANGE UP
ANION GAP SERPL CALC-SCNC: 18 MMO/L — HIGH (ref 7–14)
ANION GAP SERPL CALC-SCNC: 19 MMO/L — HIGH (ref 7–14)
APTT BLD: 20.3 SEC — LOW (ref 27.5–36.3)
AST SERPL-CCNC: 57 U/L — HIGH (ref 4–32)
AST SERPL-CCNC: 65 U/L — HIGH (ref 4–32)
B PERT DNA SPEC QL NAA+PROBE: NOT DETECTED — SIGNIFICANT CHANGE UP
BASOPHILS # BLD AUTO: 0.02 K/UL — SIGNIFICANT CHANGE UP (ref 0–0.2)
BASOPHILS # BLD AUTO: 0.03 K/UL — SIGNIFICANT CHANGE UP (ref 0–0.2)
BASOPHILS NFR BLD AUTO: 0.3 % — SIGNIFICANT CHANGE UP (ref 0–2)
BASOPHILS NFR BLD AUTO: 0.4 % — SIGNIFICANT CHANGE UP (ref 0–2)
BILIRUB SERPL-MCNC: 0.6 MG/DL — SIGNIFICANT CHANGE UP (ref 0.2–1.2)
BILIRUB SERPL-MCNC: 1 MG/DL — SIGNIFICANT CHANGE UP (ref 0.2–1.2)
BLD GP AB SCN SERPL QL: NEGATIVE — SIGNIFICANT CHANGE UP
BUN SERPL-MCNC: 4 MG/DL — LOW (ref 7–23)
BUN SERPL-MCNC: 5 MG/DL — LOW (ref 7–23)
C PNEUM DNA SPEC QL NAA+PROBE: NOT DETECTED — SIGNIFICANT CHANGE UP
CALCIUM SERPL-MCNC: 9.1 MG/DL — SIGNIFICANT CHANGE UP (ref 8.4–10.5)
CALCIUM SERPL-MCNC: 9.1 MG/DL — SIGNIFICANT CHANGE UP (ref 8.4–10.5)
CHLORIDE SERPL-SCNC: 104 MMOL/L — SIGNIFICANT CHANGE UP (ref 98–107)
CHLORIDE SERPL-SCNC: 104 MMOL/L — SIGNIFICANT CHANGE UP (ref 98–107)
CK SERPL-CCNC: 42 U/L — SIGNIFICANT CHANGE UP (ref 25–170)
CO2 SERPL-SCNC: 13 MMOL/L — LOW (ref 22–31)
CO2 SERPL-SCNC: 14 MMOL/L — LOW (ref 22–31)
CREAT SERPL-MCNC: 0.44 MG/DL — LOW (ref 0.5–1.3)
CREAT SERPL-MCNC: 0.47 MG/DL — LOW (ref 0.5–1.3)
CRP SERPL-MCNC: 152.4 MG/L — HIGH
CULTURE RESULTS: NO GROWTH — SIGNIFICANT CHANGE UP
D DIMER BLD IA.RAPID-MCNC: 4912 NG/ML — SIGNIFICANT CHANGE UP
EOSINOPHIL # BLD AUTO: 0 K/UL — SIGNIFICANT CHANGE UP (ref 0–0.5)
EOSINOPHIL # BLD AUTO: 0.01 K/UL — SIGNIFICANT CHANGE UP (ref 0–0.5)
EOSINOPHIL NFR BLD AUTO: 0 % — SIGNIFICANT CHANGE UP (ref 0–6)
EOSINOPHIL NFR BLD AUTO: 0.1 % — SIGNIFICANT CHANGE UP (ref 0–6)
FERRITIN SERPL-MCNC: 363.8 NG/ML — HIGH (ref 15–150)
FIBRINOGEN PPP-MCNC: 741 MG/DL — HIGH (ref 300–520)
FLUAV H1 2009 PAND RNA SPEC QL NAA+PROBE: NOT DETECTED — SIGNIFICANT CHANGE UP
FLUAV H1 RNA SPEC QL NAA+PROBE: NOT DETECTED — SIGNIFICANT CHANGE UP
FLUAV H3 RNA SPEC QL NAA+PROBE: NOT DETECTED — SIGNIFICANT CHANGE UP
FLUAV SUBTYP SPEC NAA+PROBE: NOT DETECTED — SIGNIFICANT CHANGE UP
FLUBV RNA SPEC QL NAA+PROBE: NOT DETECTED — SIGNIFICANT CHANGE UP
GLUCOSE SERPL-MCNC: 73 MG/DL — SIGNIFICANT CHANGE UP (ref 70–99)
GLUCOSE SERPL-MCNC: 91 MG/DL — SIGNIFICANT CHANGE UP (ref 70–99)
HADV DNA SPEC QL NAA+PROBE: NOT DETECTED — SIGNIFICANT CHANGE UP
HCOV PNL SPEC NAA+PROBE: SIGNIFICANT CHANGE UP
HCT VFR BLD CALC: 26.8 % — LOW (ref 34.5–45)
HGB BLD-MCNC: 8.8 G/DL — LOW (ref 11.5–15.5)
HMPV RNA SPEC QL NAA+PROBE: NOT DETECTED — SIGNIFICANT CHANGE UP
HPIV1 RNA SPEC QL NAA+PROBE: NOT DETECTED — SIGNIFICANT CHANGE UP
HPIV2 RNA SPEC QL NAA+PROBE: NOT DETECTED — SIGNIFICANT CHANGE UP
HPIV3 RNA SPEC QL NAA+PROBE: NOT DETECTED — SIGNIFICANT CHANGE UP
HPIV4 RNA SPEC QL NAA+PROBE: NOT DETECTED — SIGNIFICANT CHANGE UP
IMM GRANULOCYTES NFR BLD AUTO: 0.7 % — SIGNIFICANT CHANGE UP (ref 0–1.5)
IMM GRANULOCYTES NFR BLD AUTO: 0.8 % — SIGNIFICANT CHANGE UP (ref 0–1.5)
INR BLD: 0.99 — SIGNIFICANT CHANGE UP (ref 0.88–1.17)
LACTATE SERPL-SCNC: 0.7 MMOL/L — SIGNIFICANT CHANGE UP (ref 0.5–2)
LACTATE SERPL-SCNC: 3.7 MMOL/L — HIGH (ref 0.5–2)
LDH SERPL L TO P-CCNC: 235 U/L — HIGH (ref 135–225)
LYMPHOCYTES # BLD AUTO: 0.75 K/UL — LOW (ref 1–3.3)
LYMPHOCYTES # BLD AUTO: 1.25 K/UL — SIGNIFICANT CHANGE UP (ref 1–3.3)
LYMPHOCYTES # BLD AUTO: 17.7 % — SIGNIFICANT CHANGE UP (ref 13–44)
LYMPHOCYTES # BLD AUTO: 9.9 % — LOW (ref 13–44)
MAGNESIUM SERPL-MCNC: 1.5 MG/DL — LOW (ref 1.6–2.6)
MANUAL SMEAR VERIFICATION: SIGNIFICANT CHANGE UP
MCHC RBC-ENTMCNC: 31.7 PG — SIGNIFICANT CHANGE UP (ref 27–34)
MCHC RBC-ENTMCNC: 32.8 % — SIGNIFICANT CHANGE UP (ref 32–36)
MCV RBC AUTO: 96.4 FL — SIGNIFICANT CHANGE UP (ref 80–100)
MONOCYTES # BLD AUTO: 0.09 K/UL — SIGNIFICANT CHANGE UP (ref 0–0.9)
MONOCYTES # BLD AUTO: 0.29 K/UL — SIGNIFICANT CHANGE UP (ref 0–0.9)
MONOCYTES NFR BLD AUTO: 1.3 % — LOW (ref 2–14)
MONOCYTES NFR BLD AUTO: 3.8 % — SIGNIFICANT CHANGE UP (ref 2–14)
NEUTROPHILS # BLD AUTO: 5.64 K/UL — SIGNIFICANT CHANGE UP (ref 1.8–7.4)
NEUTROPHILS # BLD AUTO: 6.47 K/UL — SIGNIFICANT CHANGE UP (ref 1.8–7.4)
NEUTROPHILS NFR BLD AUTO: 79.8 % — HIGH (ref 43–77)
NEUTROPHILS NFR BLD AUTO: 85.2 % — HIGH (ref 43–77)
NRBC # FLD: 0 K/UL — SIGNIFICANT CHANGE UP (ref 0–0)
NRBC # FLD: 0 K/UL — SIGNIFICANT CHANGE UP (ref 0–0)
NT-PROBNP SERPL-SCNC: 1335 PG/ML — SIGNIFICANT CHANGE UP
PHOSPHATE SERPL-MCNC: 3.4 MG/DL — SIGNIFICANT CHANGE UP (ref 2.5–4.5)
PLATELET # BLD AUTO: 136 K/UL — LOW (ref 150–400)
PMV BLD: 12 FL — SIGNIFICANT CHANGE UP (ref 7–13)
POTASSIUM SERPL-MCNC: 3.3 MMOL/L — LOW (ref 3.5–5.3)
POTASSIUM SERPL-MCNC: 3.9 MMOL/L — SIGNIFICANT CHANGE UP (ref 3.5–5.3)
POTASSIUM SERPL-SCNC: 3.3 MMOL/L — LOW (ref 3.5–5.3)
POTASSIUM SERPL-SCNC: 3.9 MMOL/L — SIGNIFICANT CHANGE UP (ref 3.5–5.3)
PROCALCITONIN SERPL-MCNC: 3.67 NG/ML — HIGH (ref 0.02–0.1)
PROT SERPL-MCNC: 6.2 G/DL — SIGNIFICANT CHANGE UP (ref 6–8.3)
PROT SERPL-MCNC: 7.3 G/DL — SIGNIFICANT CHANGE UP (ref 6–8.3)
PROTHROM AB SERPL-ACNC: 11.4 SEC — SIGNIFICANT CHANGE UP (ref 9.8–13.1)
RBC # BLD: 2.78 M/UL — LOW (ref 3.8–5.2)
RBC # FLD: 13.9 % — SIGNIFICANT CHANGE UP (ref 10.3–14.5)
RH IG SCN BLD-IMP: POSITIVE — SIGNIFICANT CHANGE UP
RSV RNA SPEC QL NAA+PROBE: NOT DETECTED — SIGNIFICANT CHANGE UP
RV+EV RNA SPEC QL NAA+PROBE: NOT DETECTED — SIGNIFICANT CHANGE UP
SARS-COV-2 RNA SPEC QL NAA+PROBE: SIGNIFICANT CHANGE UP
SODIUM SERPL-SCNC: 136 MMOL/L — SIGNIFICANT CHANGE UP (ref 135–145)
SODIUM SERPL-SCNC: 136 MMOL/L — SIGNIFICANT CHANGE UP (ref 135–145)
SPECIMEN SOURCE: SIGNIFICANT CHANGE UP
WBC # BLD: 7.59 K/UL — SIGNIFICANT CHANGE UP (ref 3.8–10.5)
WBC # FLD AUTO: 7.59 K/UL — SIGNIFICANT CHANGE UP (ref 3.8–10.5)

## 2020-04-26 PROCEDURE — 99232 SBSQ HOSP IP/OBS MODERATE 35: CPT

## 2020-04-26 PROCEDURE — 71275 CT ANGIOGRAPHY CHEST: CPT | Mod: 26

## 2020-04-26 PROCEDURE — 71045 X-RAY EXAM CHEST 1 VIEW: CPT | Mod: 26

## 2020-04-26 RX ORDER — FOLIC ACID 0.8 MG
1 TABLET ORAL DAILY
Refills: 0 | Status: DISCONTINUED | OUTPATIENT
Start: 2020-04-26 | End: 2020-04-28

## 2020-04-26 RX ORDER — ACETAMINOPHEN 500 MG
1000 TABLET ORAL ONCE
Refills: 0 | Status: COMPLETED | OUTPATIENT
Start: 2020-04-26 | End: 2020-04-26

## 2020-04-26 RX ORDER — POTASSIUM CHLORIDE 20 MEQ
20 PACKET (EA) ORAL
Refills: 0 | Status: COMPLETED | OUTPATIENT
Start: 2020-04-26 | End: 2020-04-26

## 2020-04-26 RX ORDER — FUROSEMIDE 40 MG
40 TABLET ORAL ONCE
Refills: 0 | Status: COMPLETED | OUTPATIENT
Start: 2020-04-26 | End: 2020-04-26

## 2020-04-26 RX ORDER — CEFTRIAXONE 500 MG/1
2000 INJECTION, POWDER, FOR SOLUTION INTRAMUSCULAR; INTRAVENOUS EVERY 24 HOURS
Refills: 0 | Status: DISCONTINUED | OUTPATIENT
Start: 2020-04-26 | End: 2020-04-28

## 2020-04-26 RX ORDER — FAMOTIDINE 10 MG/ML
20 INJECTION INTRAVENOUS
Refills: 0 | Status: DISCONTINUED | OUTPATIENT
Start: 2020-04-26 | End: 2020-04-28

## 2020-04-26 RX ORDER — FAMOTIDINE 10 MG/ML
20 INJECTION INTRAVENOUS ONCE
Refills: 0 | Status: COMPLETED | OUTPATIENT
Start: 2020-04-26 | End: 2020-04-26

## 2020-04-26 RX ADMIN — HEPARIN SODIUM 5000 UNIT(S): 5000 INJECTION INTRAVENOUS; SUBCUTANEOUS at 00:15

## 2020-04-26 RX ADMIN — Medication 1 MILLIGRAM(S): at 15:22

## 2020-04-26 RX ADMIN — FAMOTIDINE 20 MILLIGRAM(S): 10 INJECTION INTRAVENOUS at 16:51

## 2020-04-26 RX ADMIN — Medication 400 MILLIGRAM(S): at 05:15

## 2020-04-26 RX ADMIN — FAMOTIDINE 20 MILLIGRAM(S): 10 INJECTION INTRAVENOUS at 00:21

## 2020-04-26 RX ADMIN — TAMSULOSIN HYDROCHLORIDE 0.4 MILLIGRAM(S): 0.4 CAPSULE ORAL at 02:30

## 2020-04-26 RX ADMIN — TAMSULOSIN HYDROCHLORIDE 0.4 MILLIGRAM(S): 0.4 CAPSULE ORAL at 22:10

## 2020-04-26 RX ADMIN — Medication 20 MILLIEQUIVALENT(S): at 19:52

## 2020-04-26 RX ADMIN — MEROPENEM 100 MILLIGRAM(S): 1 INJECTION INTRAVENOUS at 07:51

## 2020-04-26 RX ADMIN — HEPARIN SODIUM 5000 UNIT(S): 5000 INJECTION INTRAVENOUS; SUBCUTANEOUS at 10:28

## 2020-04-26 RX ADMIN — HEPARIN SODIUM 5000 UNIT(S): 5000 INJECTION INTRAVENOUS; SUBCUTANEOUS at 22:10

## 2020-04-26 RX ADMIN — Medication 20 MILLIEQUIVALENT(S): at 16:53

## 2020-04-26 RX ADMIN — Medication 40 MILLIGRAM(S): at 00:10

## 2020-04-26 RX ADMIN — CEFTRIAXONE 100 MILLIGRAM(S): 500 INJECTION, POWDER, FOR SOLUTION INTRAMUSCULAR; INTRAVENOUS at 15:22

## 2020-04-26 RX ADMIN — Medication 1 TABLET(S): at 12:28

## 2020-04-26 NOTE — PROGRESS NOTE ADULT - SUBJECTIVE AND OBJECTIVE BOX
R3 Antepartum Note - HD#4    S: Patient seen and examined at bedside, RRT called overnight for persistent tachycardia to 180s, see notes.  Pt tachycardia now resolved, HR 90-100s.  Pt states chills resolved, denies SOB, CP, just endorsing heartburn, received pepcid earlier for this w some improvement in symptoms.    Pt reports good fetal movement.  Denies leakage of fluid, contractions, vaginal bleeding.    States diarrhea from earlier was first episode she had, and had formed stool BM afterwards.    Denies HA, epigastric pain, blurred vision, CP, SOB, N/V, fevers, and chills, levin catheter in situ.    O:   Vital Signs Last 24 Hours  T(C): 36.5 (04-26-20 @ 04:40), Max: 39.6 (04-26-20 @ 00:15)  HR: 99 (04-26-20 @ 04:44) (84 - 224)  BP: 99/47 (04-26-20 @ 02:15) (90/33 - 157/135)  RR: 31 (04-26-20 @ 02:00) (22 - 39)  SpO2: 90% (04-26-20 @ 04:44) (57% - 100%)      Physical Exam:  Cardio: nl S1/S2, RRR  Pulm: CTABL  General: NAD  Abdomen: Soft, non-tender, gravid  Levin: in situ draining clear urine  Ext: No pain or swelling    FHT: resolution of fetal tachycardia, now baseline in 120s, mod shira, +accels, no decels  [place]: no cxts    Labs:             10.5   7.07  )-----------( 162      ( 04-25 @ 23:35 )             33.2     04-25 @ 23:12    136  |  104  |  4   ----------------------------<  91  3.9   |  13  |  0.44    Ca    9.1      04-25 @ 23:12  Phos  3.4     04-25 @ 23:12  Mg     1.5     04-25 @ 23:12    TPro  7.3  /  Alb  3.3  /  TBili  1.0  /  DBili  x   /  AST  65  /  ALT  60  /  AlkPhos  161  04-25 @ 23:12    PT/INR - ( 04-26 @ 00:15 )   PT: 11.4 SEC;   INR: 0.99     PTT - ( 04-26 @ 00:15 )  PTT:20.3 SEC    Uric Acid: (04-26 @ 00:15)  --       Fibrinogen: (04-26 @ 00:15)  741.0    LDH: (04-26 @ 00:15)  --         MEDICATIONS  (STANDING):  acetaminophen  IVPB .. 1000 milliGRAM(s) IV Intermittent once  heparin   Injectable 5000 Unit(s) SubCutaneous every 12 hours  meropenem  IVPB 1000 milliGRAM(s) IV Intermittent every 8 hours  sodium chloride 0.9%. 1000 milliLiter(s) (150 mL/Hr) IV Continuous <Continuous>  tamsulosin 0.4 milliGRAM(s) Oral at bedtime    MEDICATIONS  (PRN): [employer]  Azeri Name Joey, ID# [id number]    R3 Antepartum Note - HD#4    S: Patient seen and examined at bedside, RRT called overnight for persistent tachycardia to 180s, see notes.  Pt tachycardia now resolved, HR 90-100s.  Pt states chills resolved, denies SOB, CP, just endorsing heartburn, received pepcid earlier for this w some improvement in symptoms.    Pt reports good fetal movement.  Denies leakage of fluid, contractions, vaginal bleeding.    States diarrhea from earlier was first episode she had, and had formed stool BM afterwards.    Denies HA, epigastric pain, blurred vision, CP, SOB, N/V, fevers, and chills, levin catheter in situ.    O:   Vital Signs Last 24 Hours  T(C): 36.5 (04-26-20 @ 04:40), Max: 39.6 (04-26-20 @ 00:15)  HR: 99 (04-26-20 @ 04:44) (84 - 224)  BP: 99/47 (04-26-20 @ 02:15) (90/33 - 157/135)  RR: 31 (04-26-20 @ 02:00) (22 - 39)  SpO2: 90% (04-26-20 @ 04:44) (57% - 100%)      Physical Exam:  Cardio: nl S1/S2, RRR  Pulm: CTABL  General: NAD  Abdomen: Soft, non-tender, gravid  Levin: in situ draining clear urine  Ext: No pain or swelling    FHT: resolution of fetal tachycardia, now baseline in 120s, mod shira, +accels, no decels  [place]: no cxts    Labs:             10.5   7.07  )-----------( 162      ( 04-25 @ 23:35 )             33.2     04-25 @ 23:12    136  |  104  |  4   ----------------------------<  91  3.9   |  13  |  0.44    Ca    9.1      04-25 @ 23:12  Phos  3.4     04-25 @ 23:12  Mg     1.5     04-25 @ 23:12    TPro  7.3  /  Alb  3.3  /  TBili  1.0  /  DBili  x   /  AST  65  /  ALT  60  /  AlkPhos  161  04-25 @ 23:12    PT/INR - ( 04-26 @ 00:15 )   PT: 11.4 SEC;   INR: 0.99     PTT - ( 04-26 @ 00:15 )  PTT:20.3 SEC    Uric Acid: (04-26 @ 00:15)  --       Fibrinogen: (04-26 @ 00:15)  741.0    LDH: (04-26 @ 00:15)  --         MEDICATIONS  (STANDING):  acetaminophen  IVPB .. 1000 milliGRAM(s) IV Intermittent once  heparin   Injectable 5000 Unit(s) SubCutaneous every 12 hours  meropenem  IVPB 1000 milliGRAM(s) IV Intermittent every 8 hours  sodium chloride 0.9%. 1000 milliLiter(s) (150 mL/Hr) IV Continuous <Continuous>  tamsulosin 0.4 milliGRAM(s) Oral at bedtime    MEDICATIONS  (PRN):

## 2020-04-26 NOTE — PROVIDER CONTACT NOTE (CHANGE IN STATUS NOTIFICATION) - ACTION/TREATMENT ORDERED:
Rapid response called with blood work ordered. STAT chest x-ray to determine if symptoms covid related. Continuous EFM monitoring for the time being. Continue to monitor.

## 2020-04-26 NOTE — PROGRESS NOTE ADULT - ASSESSMENT
29/F  @29.1 weeks gestation with no significant PMH/PSH.  Presented with left flank pain, chills, dysuria and sense of incomplete evacuation.  Febrile (101.1). RBUS showed left sided 1cm kidney stone without obstruction. UA positive.  ID consulted for recs.  Continues to spike fevers and be tachycardic  has been on meropenem  blood culture x2 are negative  urine culture >100K colonies ecoli S to ceftriaxone     1) Pyelonephritis  - all 4 blood cultures are negative to day, would change to ceftriaxone 2g q24hrs   - remove levin    2) R/O Covid-19  - low suspicion and 3 negative results now  - can d/c isolation    3)Renal Stone  -consider urology consult  for stone management     Discussed with OB fellow    Please call the ID service [phone] with questions or concerns over the weekend

## 2020-04-26 NOTE — PROVIDER CONTACT NOTE (CHANGE IN STATUS NOTIFICATION) - BACKGROUND
Patient had febrile illness throughout stay. S/p covid (-) x 2. Last result from 4/23. Ordered for meropenem IVPB.

## 2020-04-26 NOTE — PROGRESS NOTE ADULT - PROBLEM SELECTOR PLAN 1
Appreciate ID recommendations. Currently on Meropenem, ID to determine if change to ceftriaxone needed if BCx negative.   UCx 4/23: EColi, repeat UCx 4/24: negative  WBC downtrending, lactate downtrending  However inflammatory markers (CRP, Procalcitonin, Ferritin, DDimer) elevated, which is why COVID testing repeated. While testing was negative, ID recommended CT Angio due to concern on CXR findings (prior CXR negative). Patient currently on isolation precautions until CT Angio results.   Currently on flomax s/p Renal Sono x2 which is notable for a nonobstructing stone, no abscess   Strict I/O, will likely discontinue levin today    Will try to plan for TTE after CT Angio (elevated BNP) to evaluate for any signs of cardiomyopathy

## 2020-04-26 NOTE — PROVIDER CONTACT NOTE (CHANGE IN STATUS NOTIFICATION) - ASSESSMENT
Patient tachypneic respirations high 20/30s, tachycardic into 140/150s persistently, oxygen saturation unable to attain, blood pressure elevated, however currently afebrile.

## 2020-04-26 NOTE — CHART NOTE - NSCHARTNOTEFT_GEN_A_CORE
R3 Chart Note    CTA results reviewed with radiology and ID.  Findings significant for diffuse b/l patchy ground glass opacities,  Per radiology, findings look more like classic COVID pneumonia and less likely 2/2 ARDS from pyelo  Per ID, due to radiographic findings, continue airborne and contact. Recommend RVP to r/o other etiologies for atypical viral pneumonia. ID does not recommend treatment for COVID at this time given that pt is currently asymptomatic.       EXAM:  CT ANGIO CHEST (W)AW IC    PROCEDURE DATE:  Apr 26 2020   INTERPRETATION:  CLINICAL INFORMATION: Persistent tachycardia and desaturation  COMPARISON: None.  PROCEDURE:   CT Angiography of the Chest.  80 ml of Omnipaque 350 was injected intravenously. 20 ml were discarded.  Sagittal and coronal reformats were performed as well as 3D (MIP) reconstructions.  FINDINGS:  LUNGS AND AIRWAYS: Patent central airways.  Diffuse bilateral patchy groundglass opacities.  PLEURA: Trace bilateral pleural effusions.  MEDIASTINUM AND CARLOS: No lymphadenopathy.  VESSELS: Limited visualization of the subsegmental branches secondary to poor contrast opacification the pulmonary artery as well as respiratory motion artifact. No main or segmental pulmonary emboli.  HEART: Heart size is normal. No pericardial effusion.  CHEST WALL AND LOWER NECK: Within normal limits.  VISUALIZED UPPER ABDOMEN: Within normal limits.  BONES: Within normal limits.  IMPRESSION:   Pattern of GGO suggests infection including atypical pneumonia/viral infection from atypical agents including COVID-19 (C19V-1).  No pulmonary emboli is identified. Limited visualization of the subsegmental vessels.    Plan  -RVP collected, f/u results  -AM CBC/CMP  -Continue to monitor VS closely  -abx transitioned from meropenem to ceftriaxone today    D/w Dr. Hernandez and MFM fellow  SCOOBY Merida PGY3

## 2020-04-26 NOTE — PROGRESS NOTE ADULT - PROBLEM SELECTOR PLAN 1
Neuro: no active issues  CV: Hemodynamically stable, continue to monitor closely; ordered CT angio to r/o PE  Pulm: Now saturating well on room air, encourage incentive spirometry  GI: C/w regular diet, if another episode of diarrhea, send C diff  : levin in situ, continue w strict I&Os, strain urine; s/p IV lasix 40 ()  repeat renal US no abscess, 1cm non obstructing stone, appreciate further uro recs  Heme: c/w HSQ and SCDs for DVT ppx  Fetus: continuous EFM given recent desat and tachy  ATU (): Vtx, post plac, JOSELINE 16.8, EFW 1336grams (31%), BPP   ID: s/p ceftriaxone (-), c/w meropenem (-) per ID, UCx () w E coli, BCx () NGTD, repeat BCx () obtained, f/u results; continue to trend fever curve, WC, labs concerning for covid, however difficult to interpret given pyelo, f/u covid swab from   Dispo: Continue to monitor closely, continue w routine  care, appreciate MICU recs, ID recs    CHANG Castillo PGY3

## 2020-04-26 NOTE — CHART NOTE - NSCHARTNOTEFT_GEN_A_CORE
30 y/o  at 29 3/7 weeks admitted w/ presumed pyelonephritis and confirmed nephrolithiasis, Antibiotics changed to meropenem on  due to persistent febrile episodes.    RRT called for tachycardia to 180's. Pt febrile to 101.4. IV Tylenol given. Portable CXR done and concerning for fluid overload vs COVID19 (despite negative COVID 19 swab x 2).   EKG showing  sinus tachycardia. Labs sent.      Will send Amnisure to r/o ROM and discuss with ESEQUIEL and ID.    Zay Barber M.D.  Ob

## 2020-04-26 NOTE — PROGRESS NOTE ADULT - ASSESSMENT
Patient is a 29 year old woman  at 29 3/7 week admitted to OBGYN service for left flank pain, admitted for pyelonephritis. MICU consulted for tachycardia.     # Tachycardia  - Sinus tachycardia likely related to fever   - Agree with IV Tylenol and monitor fever curve  - Last blood culture , would repeat blood culture as patient is persistently febrile   - Urine culture from  shows E coli that is pansensitive, can continue with Meropenem for now, until fever curve goes down  - In the setting of pyelonephritis, fever can persists for a few days despite optimal antibiotic therapy   - Would be cautious with standing IVF administration and fluid overload    Patient does not require MICU level of care at this time. Please re-consult as needed.     Elysia Okeefe PGY-3  Internal medicine MICU  [phone]

## 2020-04-26 NOTE — PROGRESS NOTE ADULT - SUBJECTIVE AND OBJECTIVE BOX
Elysia Okeefe PGY-3  Internal medicine MICU   Pager [phone]    HPI:   Patient is a 29 year old woman  at 29 3/7 week admitted to OBGYN service for left flank pain, admitted for sepsis due to pyelonephritis. Bladder and kidney ultrasound showed 1 cm left kidney stone. MICU consulted earlier during the admission for tachycardia on , which improved with fever control. Antibiotics was broadened from Ceftriaxone (-) to Meropenem (-). Patient COVID negative x2. Blood culture NGTD and urine culture shows E. coli that is pansensitive except for Ampicillin. Patient had RRT on night of  for tachycardia to 180s. Patient was also found to be febrile to 101.4 and given IV Tylenol. EKG confirmed sinus tachycardia. MICU again consulted for tachycardia.    Patient seen with the MICU attending. Patient's currently heart rate between 130-140, sinus on telemetry monitoring. Patient's BP stable. On 10 L NC with O2 saturation >90%. Patient is AAOX3, stating that she experiences acid reflux. She is  on IVF with significant UOP.     Medical H/x: Denies  Surgical H/x: Denies  Ob/Gyn H/x: Denies  Meds: Pnv  NKDA (2020 05:04)      PAST MEDICAL & SURGICAL HISTORY:  No pertinent past medical history  No significant past surgical history              FAMILY HISTORY:      SOCIAL HISTORY:  Tobacco use: denies  Alcohol: denies  Other drug use: denies      REVIEW OF SYSTEMS:  Constitutional: Fever  HEENT:  No postnasal drip or nasal congestion.  CV: No chest pain   Resp: No cough   GI: Dyspepsia  : No dysuria   Musculoskeletal: No back pain   Skin: No rash        No Known Allergies    Home Medications:     MEDICATIONS  (STANDING):  famotidine Injectable 20 milliGRAM(s) IV Push once  furosemide   Injectable 40 milliGRAM(s) IV Push once  heparin   Injectable 5000 Unit(s) SubCutaneous every 12 hours  meropenem  IVPB 1000 milliGRAM(s) IV Intermittent every 8 hours  sodium chloride 0.9%. 1000 milliLiter(s) (150 mL/Hr) IV Continuous <Continuous>  tamsulosin 0.4 milliGRAM(s) Oral at bedtime    MEDICATIONS  (PRN):        OBJECTIVE:     Vital Signs Last 24 Hrs  T(C): 36.8 (2020 22:30), Max: 39.5 (2020 01:15)  T(F): 98.2 (2020 22:30), Max: 103.1 (2020 01:15)  HR: 143 (2020 00:29) (84 - 224)  BP: 107/89 (2020 22:30) (90/33 - 150/76)  BP(mean): 92 (2020 22:30) (41 - 93)  RR: 33 (2020 22:30) (22 - 39)  SpO2: 94% (2020 00:29) (57% - 100%)    PHYSICAL EXAM:  General: Alert and cooperative   Head: Normocephalic    Respiratory: Bilateral lung clear to auscultation   Cardiovascular: S1/S2 auscultated, no murmur, or gallop. Rhythm is regular. There is no peripheral edema   Abdomen: Soft, non-tender, nondistended, no guarding or rebound tenderness. Active bowel sounds in all 4 quadrants    Extremities: No  peripheral edema,   Skin: Intact, no rash   Neurological: AOAx4         I&O's Detail    2020 07:01  -  2020 07:00  --------------------------------------------------------  IN:    Lactated Ringers IV Bolus: 1500 mL    lactated ringers.: 75 mL    lactated ringers.: 1250 mL    lactated ringers.: 1200 mL    Oral Fluid: 1000 mL  Total IN: 5025 mL    OUT:    Voided: 6075 mL  Total OUT: 6075 mL    Total NET: -1050 mL      2020 07:01  -  2020 00:33  --------------------------------------------------------  IN:    sodium chloride 0.9%.: 1800 mL  Total IN: 1800 mL    OUT:    Indwelling Catheter - Urethral: 4870 mL  Total OUT: 4870 mL    Total NET: -3070 mL          LABS:                        10.5   7.07  )-----------( 162      ( 2020 23:35 )             33.2     Hgb Trend: 10.5<--, 10.0<--, 9.1<--, 8.8<--, 8.9<--      136  |  104  |  4<L>  ----------------------------<  91  3.9   |  13<L>  |  0.44<L>    Ca    9.1      2020 23:12  Phos  3.4       Mg     1.5         TPro  7.3  /  Alb  3.3  /  TBili  1.0  /  DBili  x   /  AST  65<H>  /  ALT  60<H>  /  AlkPhos  161<H>      Creatinine Trend: 0.44<--, 0.54<--, 0.49<--, 0.44<--, 0.50<--, 0.55<--  PT/INR - ( 2020 07:00 )   PT: 11.7 SEC;   INR: 1.02          PTT - ( 2020 07:00 )  PTT:29.1 SEC          MICROBIOLOGY:   Culture - Blood (20 @ 05:46)    Specimen Source: .Blood Blood-Peripheral    Culture Results:   No growth to date.    Culture - Urine (20 @ 05:06)    -  Amikacin: S <=16    -  Ampicillin: R >16 These ampicillin results predict results for amoxicillin    -  Ampicillin/Sulbactam: I 16 Enterobacter, Citrobacter, and Serratia may develop resistance during prolonged therapy (3-4 days)    -  Aztreonam: S <=4    -  Cefazolin: S <=8 (MIC_CL_COM_ENTERIC_CEFAZU) For uncomplicated UTI with K. pneumoniae, E. coli, or P. mirablis: SHANTEL <=16 is sensitive and SHANTEL >=32 is resistant. This also predicts results for oral agents cefaclor, cefdinir, cefpodoxime, cefprozil, cefuroxime axetil, cephalexin and locarbef for uncomplicated UTI. Note that some isolates may be susceptible to these agents while testing resistant to cefazolin.    -  Cefepime: S <=4    -  Cefoxitin: S <=8    -  Ceftriaxone: S <=1 Enterobacter, Citrobacter, and Serratia may develop resistance during prolonged therapy    -  Ciprofloxacin: S <=1    -  Gentamicin: S <=4    -  Imipenem: S <=1    -  Levofloxacin: S <=2    -  Meropenem: S <=1    -  Nitrofurantoin: S <=32 Should not be used to treat pyelonephritis    -  Piperacillin/Tazobactam: S <=16    -  Tigecycline: S <=2    -  Tobramycin: S <=4    -  Trimethoprim/Sulfamethoxazole: S <=2/38    Specimen Source: .Urine Clean Catch (Midstream)    Culture Results:   >100,000 CFU/ml Escherichia coli  Normal Urogenital vladimir present    Organism Identification: Escherichia coli    Organism: Escherichia coli    Method Type: SHANTEL

## 2020-04-26 NOTE — PROGRESS NOTE ADULT - SUBJECTIVE AND OBJECTIVE BOX
MFM Fellow    Patient seen at bedside. Currently moving around in bed without difficulty. Tolerating diet. Brown in place  +FM, denies lof/ctx/vb. Denies any back pain. Prior pain complaints during hospital admission have resolved. Denies any SOB, N/V, fevers/chills. +GERD. Currently speaking without difficulty on RA    Overnight, patient noted to be febrile, tachycardic and desaturating. RRT called, and O2 improved with NC. Bedside CXR was concerning for possible COVID vs fluid overload. Repeat COVID testing negative. Patient also given Lasix 40x1. HR downtrended slowly, and no medications were given. Tylenol and cooling blankets given for fever. IVF discontinued due to concern for fluid overload. MICU team evaluated patient at bedside    Patient was also complaining of possible LOF. Ruled OUT for PPROM. Amnisure negative      12 point ROS negative except as outlined above    Vital Signs Last 24 Hrs  T(C): 36.4 (26 Apr 2020 09:08), Max: 39.6 (26 Apr 2020 00:15)  T(F): 97.5 (26 Apr 2020 09:08), Max: 103.3 (26 Apr 2020 00:15)  HR: 96 (26 Apr 2020 09:08) (77 - 178)  BP: 102/49 (26 Apr 2020 09:08) (90/43 - 157/135)  BP(mean): 58 (26 Apr 2020 06:30) (50 - 138)  RR: 24 (26 Apr 2020 09:08) (22 - 37)  SpO2: 99% (26 Apr 2020 09:08) (57% - 100%)    , mod shira, +accels, 1 variable noted- slightly discontinuous  TOCO none    LABS:                        8.8    7.59  )-----------( 136      ( 26 Apr 2020 08:30 )             26.8     04-26    136  |  104  |  5<L>  ----------------------------<  73  3.3<L>   |  14<L>  |  0.47<L>    Ca    9.1      26 Apr 2020 08:30  Phos  3.4     04-25  Mg     1.5     04-25    Lactate 3.7->0.7    TPro  6.2  /  Alb  2.8<L>  /  TBili  0.6  /  DBili  x   /  AST  57<H>  /  ALT  51<H>  /  AlkPhos  136<H>  04-26    PT/INR - ( 26 Apr 2020 00:15 )   PT: 11.4 SEC;   INR: 0.99          PTT - ( 26 Apr 2020 00:15 )  PTT:20.3 SEC      RADIOLOGY & ADDITIONAL TESTS:

## 2020-04-26 NOTE — PROGRESS NOTE ADULT - PROBLEM SELECTOR PLAN 2
DVT ppx  PNV/Folic Acid  Regular diet    will continue to monitor BP, elevated BP noted when patient febrile. If continues, will likely be meeting criteria for gHTN. Pr/Cr 0.7 (however would likely repeat when not in the setting of infection)    No indication for BMZ at this time    seen with Dr Bere Son MD

## 2020-04-26 NOTE — PROGRESS NOTE ADULT - ATTENDING COMMENTS
Alena Ronquillo MD  Pager: [phone]  After 5 PM or weekends please call fellow on call or office [phone]
As above, Pt seen at bedside, alert and awake, oriented,  noted tachycardic hr 130, pt noted febrile 101, on fetal  monitoring, s/p iv lasix, diuresing well, Pt is hemodynamically  stable, does not require icu level care, Please reconsult if condition  changes.
Attending Note      at 29 + with pyelo on meropenem with temperature and tachycardic  urine culture gram negative rods   will repeat cultures  IV bolus   morphine for pain   tylenol prn   cooling blanket not available, ice applied     Queenie Hernandez
MFM Attending    Patient seen and evaluated with resident and fellow. Agree with clinical assessment and management plan. Patient appears to be clinically improving. Appreciate ID recommendations.    SHEILA Blackburn

## 2020-04-26 NOTE — PROGRESS NOTE ADULT - ASSESSMENT
A/P: 28yo  at 29w2d admitted w pyelonephritis, w persistent fevers on IV antibiotics.  Overnight, RRT called for persistent tachycardia to 180s, desaturation requiring 6L NC, febrile, w fetal tachycardia.  CXR showed concern for COVID-19 vs fluid overload, 40 lasix given.  Covid19 pcr neg x2 on admission (), w CXR clear, but repeat swab obtained along w covid labs.  Pt also was c/o fluid leaking, unclear if diarrhea vs PPROM, r/o for PPROM.  Improvement since resolution of fevers, now VSS and satting well on RA, w resolution of fetal tachycardia.  HD#4

## 2020-04-26 NOTE — PROGRESS NOTE ADULT - SUBJECTIVE AND OBJECTIVE BOX
Follow Up:  29F pregnant admitted with pyelo    Interval History:  fever.  s/p RRT for tachycardia and hypoxia.  better now.  denies dysuria.  still with levin.  no back pain.  ROS otherwise negative.    Allergies  No Known Allergies    ANTIMICROBIALS:    meropenem  IVPB 1000 every 8 hours    MEDICATIONS  (STANDING):  heparin   Injectable 5000 every 12 hours  tamsulosin 0.4 at bedtime  PRN    Vital Signs Last 24 Hrs  T(F): 97.5 (04-26-20 @ 09:08), Max: 103.3 (04-26-20 @ 00:15)  HR: 96 (04-26-20 @ 09:08)  BP: 102/49 (04-26-20 @ 09:08)  RR: 24 (04-26-20 @ 09:08)  SpO2: 99% (04-26-20 @ 09:08) (57% - 100%)    PHYSICAL EXAM:  Constitutional: non-toxic  HEAD/EYES: anicteric  ENT:  supple  Cardiovascular:   tachy  Respiratory:  clear BS bilaterally  GI:  soft, non-tender, normal bowel sounds, gravid  :  (+) levin, no CVA tenderness  Musculoskeletal:  no synovitis, normal ROM  Neurologic: awake and alert  Skin:  no rash, no erythema, no phlebitis  Psychiatric:  awake, alert, appropriate mood                        9.1    6.67  )-----------( 130      ( 25 Apr 2020 07:00 )             28.2     128<L>  |  100  |  4<L>  ----------------------------<  98  3.0<L>   |  15<L>  |  0.44<L>    Ca    8.6      25 Apr 2020 02:30    TPro  6.2  /  Alb  2.7<L>  /  TBili  0.5  /  DBili  x   /  AST  55<H>  /  ALT  48<H>  /  AlkPhos  122<H>  04-25    Lactate, Blood: 0.7 mmol/L (04.26.20 @ 08:30)  Lactate, Blood: 2.2 mmol/L (04-25-20 @ 07:00)  Lactate, Blood: 1.3 mmol/L (04-25-20 @ 02:30)  Lactate, Blood: 1.7 mmol/L (04-23-20 @ 12:19)    MICROBIOLOGY:  COVID-19 PCR . (04.26.20 @ 00:47)    COVID-19 PCR: NotDetec    COVID-19 PCR . (04.23.20 @ 14:53)    COVID-19 PCR: NotDetec    COVID-19 PCR . (04.23.20 @ 00:18)    COVID-19 PCR: NotDetec    Culture - Blood (04.25.20 @ 05:50)    Specimen Source: .Blood Blood-Peripheral    Culture Results:   No growth to date.    Culture - Blood (04.25.20 @ 05:50)    Specimen Source: .Blood Blood-Peripheral    Culture Results:   No growth to date.    Culture - Urine (04.25.20 @ 05:15)    Specimen Source: .Urine Clean Catch (Midstream)    Culture Results:   No growth    Culture - Blood (04.23.20 @ 05:46)    Specimen Source: .Blood Blood-Peripheral    Culture Results:   No growth to date.    Culture - Blood (04.23.20 @ 05:46)    Specimen Source: .Blood Blood-Peripheral    Culture Results:   No growth to date.    Culture - Urine (04.23.20 @ 05:06)    -  Amikacin: S <=16    -  Ampicillin: R >16 These ampicillin results predict results for amoxicillin    -  Ampicillin/Sulbactam: I 16/8 Enterobacter, Citrobacter, and Serratia may develop resistance during prolonged therapy (3-4 days)    -  Aztreonam: S <=4    -  Cefazolin: S <=8 (MIC_CL_COM_ENTERIC_CEFAZU) For uncomplicated UTI with K. pneumoniae, E. coli, or P. mirablis: SHANTEL <=16 is sensitive and SHANTEL >=32 is resistant. This also predicts results for oral agents cefaclor, cefdinir, cefpodoxime, cefprozil, cefuroxime axetil, cephalexin and locarbef for uncomplicated UTI. Note that some isolates may be susceptible to these agents while testing resistant to cefazolin.    -  Cefepime: S <=4    -  Cefoxitin: S <=8    -  Ceftriaxone: S <=1 Enterobacter, Citrobacter, and Serratia may develop resistance during prolonged therapy    -  Ciprofloxacin: S <=1    -  Gentamicin: S <=4    -  Imipenem: S <=1    -  Levofloxacin: S <=2    -  Meropenem: S <=1    -  Nitrofurantoin: S <=32 Should not be used to treat pyelonephritis    -  Piperacillin/Tazobactam: S <=16    -  Tigecycline: S <=2    -  Tobramycin: S <=4    -  Trimethoprim/Sulfamethoxazole: S <=2/38    Specimen Source: .Urine Clean Catch (Midstream)    Culture Results:   >100,000 CFU/ml Escherichia coli  Normal Urogenital vladimir present    Organism Identification: Escherichia coli    Organism: Escherichia coli    Method Type: SHANTEL    RADIOLOGY:  4/25 CXR worsening opacity, looks more like fluid overload    Xray Chest 1 View- PORTABLE-Urgent (04.23.20 @ 13:27) >  Impression:  Normal chest radiograph.    US Kidney and Bladder (04.22.20 @ 22:37) >  IMPRESSION:   Nonobstructing 1.0 cm calculus in the mid left kidney. No hydronephrosis.

## 2020-04-26 NOTE — CHART NOTE - NSCHARTNOTEFT_GEN_A_CORE
[employer]  [place] ID#[id number]    Called to bedside for pt tachycardia to 180s.    Pt endorsing chills, SOB, denies CP, abd pain, cramping.  States she always feels chills right before becoming febrile.  Also states she is leaking yellow fluid, possibly diarrhea.    Vital signs at that time HR 180s, BPs normotensive, satting 70s.  Pt placed on NC, bedside ultrasound for fetal HR was in 220s.  O2 sats improved to high 90s with NC@6L.  Pt afebrile at the time, but given chills/rigors, started IV tylenol, started IV bolus.      DANIA Campbell PGY4 and Dr. Barber  called to bedside.  Stat CXR and labs ordered.  RRT called for persistent tachycardia in 180s.    RR team at bedside, temperature rechecked, found to be febrile to 38.7C. Attempted to give adenosine for SVT, however on EKG sinus tachy and HR improved to 160s, adenosine not given.  Stat CXR revealed findings concerning for COVID-19.    HR improved to 140s, RR ended.    RR team recommended 40 lasix given CXR concern for covid vs fluid overload.  40 lasix administered.  MICU attending eval at bedside, not a candidate for ICU at this time, but will continue to monitor.    D/w ID regarding recent CXR findings, recommended continuing meropenem, can repeat covid swab, CTangio for r/o PE.    Pt evaluated for r/o PROM.  Spec exam revealed no pooling, cervix appears closed and long, neg nitrazine, neg ferning, amnisure sent and pending at this time.    D/w MFM, will continue w tylenol and ice baths for fever treatment, continue to monitor VS closely, continue to monitor EFM, f/u labs and amnisure.  Will obtain CT angio once pt stable.    CHANG Castillo PGY3

## 2020-04-27 LAB
ALBUMIN SERPL ELPH-MCNC: 3 G/DL — LOW (ref 3.3–5)
ALP SERPL-CCNC: 157 U/L — HIGH (ref 40–120)
ALT FLD-CCNC: 53 U/L — HIGH (ref 4–33)
ANION GAP SERPL CALC-SCNC: 17 MMO/L — HIGH (ref 7–14)
AST SERPL-CCNC: 61 U/L — HIGH (ref 4–32)
BASOPHILS # BLD AUTO: 0.03 K/UL — SIGNIFICANT CHANGE UP (ref 0–0.2)
BASOPHILS NFR BLD AUTO: 0.5 % — SIGNIFICANT CHANGE UP (ref 0–2)
BILIRUB SERPL-MCNC: 0.4 MG/DL — SIGNIFICANT CHANGE UP (ref 0.2–1.2)
BUN SERPL-MCNC: 7 MG/DL — SIGNIFICANT CHANGE UP (ref 7–23)
CALCIUM SERPL-MCNC: 9 MG/DL — SIGNIFICANT CHANGE UP (ref 8.4–10.5)
CHLORIDE SERPL-SCNC: 102 MMOL/L — SIGNIFICANT CHANGE UP (ref 98–107)
CO2 SERPL-SCNC: 15 MMOL/L — LOW (ref 22–31)
CREAT SERPL-MCNC: 0.55 MG/DL — SIGNIFICANT CHANGE UP (ref 0.5–1.3)
EOSINOPHIL # BLD AUTO: 0.03 K/UL — SIGNIFICANT CHANGE UP (ref 0–0.5)
EOSINOPHIL NFR BLD AUTO: 0.5 % — SIGNIFICANT CHANGE UP (ref 0–6)
GLUCOSE SERPL-MCNC: 57 MG/DL — LOW (ref 70–99)
HCT VFR BLD CALC: 27.3 % — LOW (ref 34.5–45)
HGB BLD-MCNC: 8.9 G/DL — LOW (ref 11.5–15.5)
IMM GRANULOCYTES NFR BLD AUTO: 1.9 % — HIGH (ref 0–1.5)
LYMPHOCYTES # BLD AUTO: 1.46 K/UL — SIGNIFICANT CHANGE UP (ref 1–3.3)
LYMPHOCYTES # BLD AUTO: 24.7 % — SIGNIFICANT CHANGE UP (ref 13–44)
MCHC RBC-ENTMCNC: 31.4 PG — SIGNIFICANT CHANGE UP (ref 27–34)
MCHC RBC-ENTMCNC: 32.6 % — SIGNIFICANT CHANGE UP (ref 32–36)
MCV RBC AUTO: 96.5 FL — SIGNIFICANT CHANGE UP (ref 80–100)
MONOCYTES # BLD AUTO: 0.35 K/UL — SIGNIFICANT CHANGE UP (ref 0–0.9)
MONOCYTES NFR BLD AUTO: 5.9 % — SIGNIFICANT CHANGE UP (ref 2–14)
NEUTROPHILS # BLD AUTO: 3.93 K/UL — SIGNIFICANT CHANGE UP (ref 1.8–7.4)
NEUTROPHILS NFR BLD AUTO: 66.5 % — SIGNIFICANT CHANGE UP (ref 43–77)
NRBC # FLD: 0 K/UL — SIGNIFICANT CHANGE UP (ref 0–0)
PLATELET # BLD AUTO: 184 K/UL — SIGNIFICANT CHANGE UP (ref 150–400)
PMV BLD: 11.2 FL — SIGNIFICANT CHANGE UP (ref 7–13)
POTASSIUM SERPL-MCNC: 4 MMOL/L — SIGNIFICANT CHANGE UP (ref 3.5–5.3)
POTASSIUM SERPL-SCNC: 4 MMOL/L — SIGNIFICANT CHANGE UP (ref 3.5–5.3)
PROT SERPL-MCNC: 6.4 G/DL — SIGNIFICANT CHANGE UP (ref 6–8.3)
RBC # BLD: 2.83 M/UL — LOW (ref 3.8–5.2)
RBC # FLD: 14 % — SIGNIFICANT CHANGE UP (ref 10.3–14.5)
SODIUM SERPL-SCNC: 134 MMOL/L — LOW (ref 135–145)
WBC # BLD: 5.91 K/UL — SIGNIFICANT CHANGE UP (ref 3.8–10.5)
WBC # FLD AUTO: 5.91 K/UL — SIGNIFICANT CHANGE UP (ref 3.8–10.5)

## 2020-04-27 PROCEDURE — 99232 SBSQ HOSP IP/OBS MODERATE 35: CPT

## 2020-04-27 PROCEDURE — 93306 TTE W/DOPPLER COMPLETE: CPT | Mod: 26

## 2020-04-27 RX ADMIN — HEPARIN SODIUM 5000 UNIT(S): 5000 INJECTION INTRAVENOUS; SUBCUTANEOUS at 23:24

## 2020-04-27 RX ADMIN — FAMOTIDINE 20 MILLIGRAM(S): 10 INJECTION INTRAVENOUS at 17:23

## 2020-04-27 RX ADMIN — CEFTRIAXONE 100 MILLIGRAM(S): 500 INJECTION, POWDER, FOR SOLUTION INTRAMUSCULAR; INTRAVENOUS at 14:45

## 2020-04-27 RX ADMIN — Medication 1 TABLET(S): at 12:08

## 2020-04-27 RX ADMIN — TAMSULOSIN HYDROCHLORIDE 0.4 MILLIGRAM(S): 0.4 CAPSULE ORAL at 23:24

## 2020-04-27 RX ADMIN — FAMOTIDINE 20 MILLIGRAM(S): 10 INJECTION INTRAVENOUS at 06:06

## 2020-04-27 RX ADMIN — Medication 1 MILLIGRAM(S): at 12:08

## 2020-04-27 RX ADMIN — HEPARIN SODIUM 5000 UNIT(S): 5000 INJECTION INTRAVENOUS; SUBCUTANEOUS at 12:09

## 2020-04-27 NOTE — PROGRESS NOTE ADULT - SUBJECTIVE AND OBJECTIVE BOX
Follow Up:  29F pregnant admitted with pyelo    Interval History:  feels better.  has not had fever > 24 h      ROS otherwise negative.    Allergies  No Known Allergies    ANTIMICROBIALS:    cefTRIAXone   IVPB 2000 every 24 hours    MEDICATIONS  (STANDING):  heparin   Injectable 5000 every 12 hours  tamsulosin 0.4 at bedtime  PRN    Vital Signs Last 24 Hrs  T(F): 97.7 (04-27-20 @ 14:46), Max: 98.2 (04-26-20 @ 22:07)  HR: 90 (04-27-20 @ 16:41)  BP: 98/55 (04-27-20 @ 14:46)  RR: 21 (04-27-20 @ 15:37)  SpO2: 94% (04-27-20 @ 16:42) (87% - 100%)    PHYSICAL EXAM:  Constitutional: non-toxic  HEAD/EYES: anicteric  ENT:  supple  Cardiovascular:   S1s2  Respiratory:  clear BS bilaterally  GI:  soft, non-tender, normal bowel sounds, gravid  :   no CVA tenderness  Musculoskeletal:  no synovitis, normal ROM  Neurologic: awake and alert  Skin:  no rash, no erythema, no phlebitis  Psychiatric:  awake, alert, appropriate mood                                   8.9    5.91  )-----------( 184      ( 27 Apr 2020 05:15 )             27.3 04-27    134  |  102  |  7   ----------------------------<  57  4.0   |  15  |  0.55  Ca    9.0      27 Apr 2020 05:15Phos  3.4     04-25Mg     1.5     04-25  TPro  6.4  /  Alb  3.0  /  TBili  0.4  /  DBili  x   /  AST  61  /  ALT  53  /  AlkPhos  157  04-27    MICROBIOLOGY:  COVID-19 PCR . (04.26.20 @ 00:47)    COVID-19 PCR: NotDetec    COVID-19 PCR . (04.23.20 @ 14:53)    COVID-19 PCR: NotDetec    COVID-19 PCR . (04.23.20 @ 00:18)    COVID-19 PCR: NotDetec    Culture - Blood (04.25.20 @ 05:50)    Specimen Source: .Blood Blood-Peripheral    Culture Results:   No growth to date.    Culture - Blood (04.25.20 @ 05:50)    Specimen Source: .Blood Blood-Peripheral    Culture Results:   No growth to date.    Culture - Urine (04.25.20 @ 05:15)    Specimen Source: .Urine Clean Catch (Midstream)    Culture Results:   No growth    Culture - Blood (04.23.20 @ 05:46)    Specimen Source: .Blood Blood-Peripheral    Culture Results:   No growth to date.    Culture - Blood (04.23.20 @ 05:46)    Specimen Source: .Blood Blood-Peripheral    Culture Results:   No growth to date.    Culture - Urine (04.23.20 @ 05:06)    -  Amikacin: S <=16    -  Ampicillin: R >16 These ampicillin results predict results for amoxicillin    -  Ampicillin/Sulbactam: I 16/8 Enterobacter, Citrobacter, and Serratia may develop resistance during prolonged therapy (3-4 days)    -  Aztreonam: S <=4    -  Cefazolin: S <=8 (MIC_CL_COM_ENTERIC_CEFAZU) For uncomplicated UTI with K. pneumoniae, E. coli, or P. mirablis: SHANTEL <=16 is sensitive and SHANTEL >=32 is resistant. This also predicts results for oral agents cefaclor, cefdinir, cefpodoxime, cefprozil, cefuroxime axetil, cephalexin and locarbef for uncomplicated UTI. Note that some isolates may be susceptible to these agents while testing resistant to cefazolin.    -  Cefepime: S <=4    -  Cefoxitin: S <=8    -  Ceftriaxone: S <=1 Enterobacter, Citrobacter, and Serratia may develop resistance during prolonged therapy    -  Ciprofloxacin: S <=1    -  Gentamicin: S <=4    -  Imipenem: S <=1    -  Levofloxacin: S <=2    -  Meropenem: S <=1    -  Nitrofurantoin: S <=32 Should not be used to treat pyelonephritis    -  Piperacillin/Tazobactam: S <=16    -  Tigecycline: S <=2    -  Tobramycin: S <=4    -  Trimethoprim/Sulfamethoxazole: S <=2/38    Specimen Source: .Urine Clean Catch (Midstream)    Culture Results:   >100,000 CFU/ml Escherichia coli  Normal Urogenital vladimir present    Organism Identification: Escherichia coli    Organism: Escherichia coli    Method Type: SHANTEL    RADIOLOGY:  4/25 CXR worsening opacity, looks more like fluid overload    Xray Chest 1 View- PORTABLE-Urgent (04.23.20 @ 13:27) >  Impression:  Normal chest radiograph.    US Kidney and Bladder (04.22.20 @ 22:37) >  IMPRESSION:   Nonobstructing 1.0 cm calculus in the mid left kidney. No hydronephrosis.

## 2020-04-27 NOTE — PROGRESS NOTE ADULT - SUBJECTIVE AND OBJECTIVE BOX
Patient is a 29y old  Female who presents with a chief complaint of fever (2020 11:34)      HPI:  Default patient 31/o  Female   @29.1 weeks gestation presents to triage from ED for evaluation. Pt presented to the ED for Left Flank Pain. Bladder and Kidney ultrasound revealed 1cm Left Kidney Stone. Pt was given 975mg Tylenol and Morphine 4mg in the ED and rates current pain score 4/10. Pt was found to have a new onset temperature of 100.3F. Pt denies urinary frequency, urgency and hematuria. Pt denies any symptoms of or exposure to COVD-19. Pt states she received PNC at [facility] and was last seen one month ago. [place] [employer]uer#[id number]  Current AP course uncomplicated  Medical H/x: Denies  Surgical H/x: Denies  Ob/Gyn H/x: Denies  Meds: Pnv  NKDA (2020 05:04)      PAST MEDICAL & SURGICAL HISTORY:  No pertinent past medical history  No significant past surgical history      MEDICATIONS  (STANDING):  cefTRIAXone   IVPB 2000 milliGRAM(s) IV Intermittent every 24 hours  famotidine    Tablet 20 milliGRAM(s) Oral two times a day  folic acid 1 milliGRAM(s) Oral daily  heparin   Injectable 5000 Unit(s) SubCutaneous every 12 hours  prenatal multivitamin 1 Tablet(s) Oral daily  tamsulosin 0.4 milliGRAM(s) Oral at bedtime              Vital Signs Last 24 Hrs  T(C): 36.6 (2020 10:22), Max: 36.8 (2020 22:07)  T(F): 97.9 (2020 10:22), Max: 98.2 (2020 22:07)  HR: 95 (2020 10:47) (73 - 123)  BP: 100/56 (2020 10:22) (82/45 - 109/56)  BP(mean): --  RR: 21 (2020 10:22) (20 - 28)  SpO2: 97% (2020 10:47) (91% - 100%)    PHYSICAL EXAM:  PAtient continues to have flank pain improved  CT scan consistent with pneumonia but Covid test was negative  Will continue to treat her as Covid positive  Continue ceftriaxone    2020 07:01  -  2020 07:00  --------------------------------------------------------  IN: 0 mL / OUT: 1800 mL / NET: -1800 mL        LABORATORY:                        8.9    5.91  )-----------( 184      ( 2020 05:15 )             27.3     04-27    134<L>  |  102  |  7   ----------------------------<  57<L>  4.0   |  15<L>  |  0.55    Ca    9.0      2020 05:15  Phos  3.4     04-25  Mg     1.5     25    TPro  6.4  /  Alb  3.0<L>  /  TBili  0.4  /  DBili  x   /  AST  61<H>  /  ALT  53<H>  /  AlkPhos  157<H>  27    PT/INR - ( 2020 00:15 )   PT: 11.4 SEC;   INR: 0.99          PTT - ( 2020 00:15 )  PTT:20.3 SEC

## 2020-04-27 NOTE — PROGRESS NOTE ADULT - SUBJECTIVE AND OBJECTIVE BOX
R3 Antepartum Note - HD#5    S: over the course of the night, pt noted to be satting 90% while sleeping so placed on 2L NC with sats 95-98%. Reports she had GERD yesterday and is tolerating clears however she does not have an appetite.  Pt reports good fetal movement.  Denies leakage of fluid, contractions, vaginal bleeding.  Denies HA, epigastric pain, blurred vision, CP, SOB, N/V, fevers, and chills, levin catheter in situ.    O:   Vital Signs Last 24 Hrs  T(C): 36.8 (27 Apr 2020 02:01), Max: 36.8 (26 Apr 2020 22:07)  T(F): 98.2 (27 Apr 2020 02:01), Max: 98.2 (26 Apr 2020 22:07)  HR: 94 (27 Apr 2020 05:11) (77 - 123)  BP: 103/53 (27 Apr 2020 05:11) (82/45 - 109/56)  BP(mean): 58 (26 Apr 2020 06:30) (58 - 68)  RR: 20 (27 Apr 2020 02:01) (20 - 28)  SpO2: 92% (27 Apr 2020 05:10) (90% - 100%)    Physical Exam:  Cardio: nl S1/S2, RRR  Pulm: CTABL  General: NAD  Back: no flank pain   Abdomen: Soft, non-tender, gravid  Ext: No pain or swelling    Labs:             10.5   7.07  )-----------( 162      ( 04-25 @ 23:35 )             33.2     04-25 @ 23:12    136  |  104  |  4   ----------------------------<  91  3.9   |  13  |  0.44    Ca    9.1      04-25 @ 23:12  Phos  3.4     04-25 @ 23:12  Mg     1.5     04-25 @ 23:12    TPro  7.3  /  Alb  3.3  /  TBili  1.0  /  DBili  x   /  AST  65  /  ALT  60  /  AlkPhos  161  04-25 @ 23:12    PT/INR - ( 04-26 @ 00:15 )   PT: 11.4 SEC;   INR: 0.99     PTT - ( 04-26 @ 00:15 )  PTT:20.3 SEC    Uric Acid: (04-26 @ 00:15)  --       Fibrinogen: (04-26 @ 00:15)  741.0    LDH: (04-26 @ 00:15)  --         MEDICATIONS  (STANDING):  acetaminophen  IVPB .. 1000 milliGRAM(s) IV Intermittent once  heparin   Injectable 5000 Unit(s) SubCutaneous every 12 hours  meropenem  IVPB 1000 milliGRAM(s) IV Intermittent every 8 hours  sodium chloride 0.9%. 1000 milliLiter(s) (150 mL/Hr) IV Continuous <Continuous>  tamsulosin 0.4 milliGRAM(s) Oral at bedtime    MEDICATIONS  (PRN):

## 2020-04-27 NOTE — PROGRESS NOTE ADULT - PROBLEM SELECTOR PLAN 1
Neuro: no active issues  CV: Hemodynamically stable, continue to monitor closely; CT angio negative for PE but showed b/l groundglass opacities suspicious for covid   Pulm: Now saturating well on room air, encourage incentive spirometry  GI: C/w regular diet, if another episode of diarrhea, send C diff  : voiding spontaneously, s/p IV lasix 40 ()  repeat renal US no abscess, 1cm non obstructing stone, appreciate further uro recs  Heme: c/w HSQ and SCDs for DVT ppx  Fetus: NST qD  ATU (): Vtx, post plac, JOSELINE 16.8, EFW 1336grams (31%), BPP   ID: s/p ceftriaxone (-), c/w meropenem (-) per ID, UCx () w E coli, BCx () NGTD, repeat BCx () obtained, f/u results; continue to trend fever curve, WC, labs concerning for covid, however difficult to interpret given pyelo, f/u covid swab from   Dispo: Continue to monitor closely, continue w routine  care, appreciate MICU recs, ID recs    TLal PGY3 Neuro: no active issues  CV: Hemodynamically stable, continue to monitor closely; CT angio negative for PE but showed b/l groundglass opacities suspicious for covid   Pulm: 2L NC overnight due to desatting to 90.    GI: C/w regular diet, if another episode of diarrhea, send C diff  : voiding spontaneously, s/p IV lasix 40 ()  repeat renal US no abscess, 1cm non obstructing stone, appreciate further uro recs  Heme: c/w HSQ and SCDs for DVT ppx  Fetus: NST qD  ATU (): Vtx, post plac, JOSELINE 16.8, EFW 1336grams (31%), BPP 8/  ID: s/p ceftriaxone (-), c/w meropenem (-) per ID, UCx () w E coli, BCx () NGTD, repeat BCx () obtained, f/u results; continue to trend fever curve, WC, labs concerning for covid, however difficult to interpret given pyelo, f/u covid swab from   Dispo: Continue to monitor closely, continue w routine  care, appreciate MICU recs, ID recs    TLal PGY3

## 2020-04-27 NOTE — CHART NOTE - NSCHARTNOTEFT_GEN_A_CORE
Patient seen and evaluated for BPP. Pt denies contx, VB, LOF. Endorses good fetal movement. Pt denies SOB, cough, chest pain, fever/chills. Flank pain has resolved. NST from this  marked variability + acels no decels, BPP done at bedside 8/8.     PE:  Vital Signs Last 24 Hrs  T(C): 36.6 (27 Apr 2020 10:22), Max: 36.8 (26 Apr 2020 22:07)  T(F): 97.9 (27 Apr 2020 10:22), Max: 98.2 (26 Apr 2020 22:07)  HR: 86 (27 Apr 2020 11:30) (73 - 123)  BP: 100/56 (27 Apr 2020 10:22) (82/45 - 109/56)  BP(mean): --  RR: 21 (27 Apr 2020 10:22) (20 - 28)  SpO2: 97% (27 Apr 2020 11:26) (91% - 100%)  EFM: 120 marked variability + acels no decels  Waterproof: none  Bedside sono: cephalic presentation/ Posterior placenta/ MVP 5.2/ BPP 8/8    Plan:  - continue to monitor  - c/w NST BID   - continue ceftriaxone as ordered    d/w Dr. Irma Eagle [facility]

## 2020-04-27 NOTE — PROGRESS NOTE ADULT - ASSESSMENT
A/P: 30yo  at 29w2d admitted w pyelonephritis, w persistent fevers on IV antibiotics.  HD#4 a, RRT called for persistent tachycardia to 180s, desaturation requiring 6L NC, febrile, w fetal tachycardia.  CXR showed concern for COVID-19 vs fluid overload, 40 lasix given.  Covid19 pcr neg x2 on admission (), w CXR clear, but repeat swab obtained along w covid labs.  Pt also was c/o fluid leaking, unclear if diarrhea vs PPROM, r/o for PPROM.  Improvement since resolution of fevers, now VSS and satting well on RA, w resolution of fetal tachycardia.  HD#5 pt with resolution of tachycardia, has been afebrile, overall improving clinically.

## 2020-04-27 NOTE — PROGRESS NOTE ADULT - ASSESSMENT
29/F  @29.1 weeks gestation with no significant PMH/PSH.  Presented with left flank pain, chills, dysuria and sense of incomplete evacuation.  Febrile (101.1). RBUS showed left sided 1cm kidney stone without obstruction. UA positive.    blood culture x2 are negative  urine culture >100K colonies ecoli S to ceftriaxone     1) Pyelonephritis  - all 4 blood cultures are negative  - continue  ceftriaxone 2g q24hrs   - may transition to keflex soon    2) R/O Covid-19  - low suspicion and 3 negative results now  - can d/c isolation    3)Renal Stone  -consider urology consult  for stone management       Alena Ronquillo MD  Pager: [phone]  After 5 PM or weekends please call fellow on call or office [phone]

## 2020-04-28 VITALS — TEMPERATURE: 98 F

## 2020-04-28 LAB
BASOPHILS # BLD AUTO: 0.03 K/UL — SIGNIFICANT CHANGE UP (ref 0–0.2)
BASOPHILS NFR BLD AUTO: 0.4 % — SIGNIFICANT CHANGE UP (ref 0–2)
CK SERPL-CCNC: 14 U/L — LOW (ref 25–170)
CRP SERPL-MCNC: 63.7 MG/L — HIGH
CULTURE RESULTS: SIGNIFICANT CHANGE UP
CULTURE RESULTS: SIGNIFICANT CHANGE UP
D DIMER BLD IA.RAPID-MCNC: 1781 NG/ML — SIGNIFICANT CHANGE UP
EOSINOPHIL # BLD AUTO: 0.06 K/UL — SIGNIFICANT CHANGE UP (ref 0–0.5)
EOSINOPHIL NFR BLD AUTO: 0.9 % — SIGNIFICANT CHANGE UP (ref 0–6)
FERRITIN SERPL-MCNC: 405.1 NG/ML — HIGH (ref 15–150)
FIBRINOGEN PPP-MCNC: 825 MG/DL — HIGH (ref 300–520)
HCT VFR BLD CALC: 29.9 % — LOW (ref 34.5–45)
HGB BLD-MCNC: 9.7 G/DL — LOW (ref 11.5–15.5)
IGA FLD-MCNC: 160 MG/DL — SIGNIFICANT CHANGE UP (ref 70–400)
IGG FLD-MCNC: 845 MG/DL — SIGNIFICANT CHANGE UP (ref 700–1600)
IGM SERPL-MCNC: 94 MG/DL — SIGNIFICANT CHANGE UP (ref 40–230)
IMM GRANULOCYTES NFR BLD AUTO: 3.2 % — HIGH (ref 0–1.5)
LDH SERPL L TO P-CCNC: 196 U/L — SIGNIFICANT CHANGE UP (ref 135–225)
LYMPHOCYTES # BLD AUTO: 2.23 K/UL — SIGNIFICANT CHANGE UP (ref 1–3.3)
LYMPHOCYTES # BLD AUTO: 32.1 % — SIGNIFICANT CHANGE UP (ref 13–44)
MANUAL SMEAR VERIFICATION: SIGNIFICANT CHANGE UP
MCHC RBC-ENTMCNC: 30.7 PG — SIGNIFICANT CHANGE UP (ref 27–34)
MCHC RBC-ENTMCNC: 32.4 % — SIGNIFICANT CHANGE UP (ref 32–36)
MCV RBC AUTO: 94.6 FL — SIGNIFICANT CHANGE UP (ref 80–100)
MONOCYTES # BLD AUTO: 0.36 K/UL — SIGNIFICANT CHANGE UP (ref 0–0.9)
MONOCYTES NFR BLD AUTO: 5.2 % — SIGNIFICANT CHANGE UP (ref 2–14)
NEUTROPHILS # BLD AUTO: 4.04 K/UL — SIGNIFICANT CHANGE UP (ref 1.8–7.4)
NEUTROPHILS NFR BLD AUTO: 58.2 % — SIGNIFICANT CHANGE UP (ref 43–77)
NRBC # FLD: 0 K/UL — SIGNIFICANT CHANGE UP (ref 0–0)
NT-PROBNP SERPL-SCNC: 80.05 PG/ML — SIGNIFICANT CHANGE UP
PLATELET # BLD AUTO: 226 K/UL — SIGNIFICANT CHANGE UP (ref 150–400)
PMV BLD: 11 FL — SIGNIFICANT CHANGE UP (ref 7–13)
RBC # BLD: 3.16 M/UL — LOW (ref 3.8–5.2)
RBC # FLD: 13.9 % — SIGNIFICANT CHANGE UP (ref 10.3–14.5)
SPECIMEN SOURCE: SIGNIFICANT CHANGE UP
SPECIMEN SOURCE: SIGNIFICANT CHANGE UP
TRIGL SERPL-MCNC: 483 MG/DL — HIGH (ref 10–149)
TROPONIN T, HIGH SENSITIVITY: 26 NG/L — SIGNIFICANT CHANGE UP (ref ?–14)
WBC # BLD: 6.94 K/UL — SIGNIFICANT CHANGE UP (ref 3.8–10.5)
WBC # FLD AUTO: 6.94 K/UL — SIGNIFICANT CHANGE UP (ref 3.8–10.5)

## 2020-04-28 PROCEDURE — 99232 SBSQ HOSP IP/OBS MODERATE 35: CPT

## 2020-04-28 RX ORDER — CEPHALEXIN 500 MG
1 CAPSULE ORAL
Qty: 32 | Refills: 0
Start: 2020-04-28 | End: 2020-05-05

## 2020-04-28 RX ORDER — FOLIC ACID 0.8 MG
1 TABLET ORAL
Qty: 0 | Refills: 0 | DISCHARGE
Start: 2020-04-28

## 2020-04-28 RX ADMIN — HEPARIN SODIUM 5000 UNIT(S): 5000 INJECTION INTRAVENOUS; SUBCUTANEOUS at 09:53

## 2020-04-28 RX ADMIN — Medication 1 MILLIGRAM(S): at 09:53

## 2020-04-28 RX ADMIN — CEFTRIAXONE 100 MILLIGRAM(S): 500 INJECTION, POWDER, FOR SOLUTION INTRAMUSCULAR; INTRAVENOUS at 13:34

## 2020-04-28 RX ADMIN — FAMOTIDINE 20 MILLIGRAM(S): 10 INJECTION INTRAVENOUS at 06:34

## 2020-04-28 RX ADMIN — FAMOTIDINE 20 MILLIGRAM(S): 10 INJECTION INTRAVENOUS at 17:12

## 2020-04-28 RX ADMIN — Medication 1 TABLET(S): at 09:52

## 2020-04-28 NOTE — PROGRESS NOTE ADULT - SUBJECTIVE AND OBJECTIVE BOX
MFM Fellow    Patient seen at bedside w/ MFM attending Dr. Solis.  She reports feeling better, denies fever/chills or flank pain. She denies ctx, LOF, VB and reports normal fetal movements.    Vital Signs Last 24 Hrs  T(C): 36.4 (28 Apr 2020 09:45), Max: 36.8 (27 Apr 2020 23:21)  T(F): 97.6 (28 Apr 2020 09:45), Max: 98.3 (28 Apr 2020 02:39)  HR: 86 (28 Apr 2020 09:46) (70 - 117)  BP: 108/64 (28 Apr 2020 09:45) (91/52 - 110/58)  BP(mean): --  RR: 19 (28 Apr 2020 09:45) (17 - 22)  SpO2: 97% (28 Apr 2020 09:46) (87% - 100%)  GA: NAD , A+OX3  EFM: NST pending  no CVAT                          9.7    6.94  )-----------( 226      ( 28 Apr 2020 06:13 )             29.9   04-27    134<L>  |  102  |  7   ----------------------------<  57<L>  4.0   |  15<L>  |  0.55    Ca    9.0      27 Apr 2020 05:15    TPro  6.4  /  Alb  3.0<L>  /  TBili  0.4  /  DBili  x   /  AST  61<H>  /  ALT  53<H>  /  AlkPhos  157<H>  04-27    MEDICATIONS  (STANDING):  cefTRIAXone   IVPB 2000 milliGRAM(s) IV Intermittent every 24 hours  famotidine    Tablet 20 milliGRAM(s) Oral two times a day  folic acid 1 milliGRAM(s) Oral daily  heparin   Injectable 5000 Unit(s) SubCutaneous every 12 hours  prenatal multivitamin 1 Tablet(s) Oral daily  tamsulosin 0.4 milliGRAM(s) Oral at bedtime    MEDICATIONS  (PRN):

## 2020-04-28 NOTE — PROGRESS NOTE ADULT - PROBLEM SELECTOR PLAN 1
Neuro: no active issues  CV: Hemodynamically stable, continue to monitor closely;   Pulm: sPO2 94-98% on RA  -CT angio negative for PE but showed b/l groundglass opacities suspicious for covid, despite COVID negative x 3. currently asymptomatic  -TTE (4/27) EF 65%, small pericardial effusion anterior to the R ventricle  GI: C/w regular diet  : voiding spontaneously, s/p IV lasix 40 (4/26)  -repeat renal US no abscess, 1cm non obstructing stone, appreciate uro recs  Heme: c/w HSQ and SCDs for DVT ppx  Fetus: NST qD. reactive  - BPP (4/27) vtx, posterior placenta.MVP 5.2, BPP 8/8  - ATU (4/24): Vtx, post plac, JOSELINE 16.8, EFW 1336grams (31%), BPP 8/8  ID: c/w ceftriaxone 2g q24h (4/26-)  -UCx (4/23) w E coli, BCx (4/23) NGTD, repeat BCx (4/25) obtained NGTD  -s/p meropenem (4/24-26 )   -continue to trend fever curve, WC, labs concerning for covid, however difficult to interpret given pyelo,  Dispo: Will f/u ID recommendations about transitioning to PO antibiotics (Keflex) duration. Discharge planning.    SCOOBY Merida PGY3

## 2020-04-28 NOTE — PROGRESS NOTE ADULT - SUBJECTIVE AND OBJECTIVE BOX
R3 Antepartum Note, HD#6     #[id number]    Patient seen and examined at bedside, no acute overnight events. No acute complaints. Pt reports +FM, denies LOF, VB, ctx, HA, epigastric pain, blurred vision, CP, SOB, N/V, fevers, and chills.    Vital Signs Last 24 Hours  T(C): 36.1 (04-28-20 @ 17:11), Max: 36.8 (04-27-20 @ 23:21)  HR: 77 (04-28-20 @ 17:11) (71 - 117)  BP: 107/63 (04-28-20 @ 17:11) (91/52 - 110/58)  RR: 19 (04-28-20 @ 17:11) (17 - 22)  SpO2: 97% (04-28-20 @ 17:11) (94% - 98%)      Physical Exam:  General: NAD  Abdomen: Soft, non-tender, gravid  Ext: No pain or swelling    NST reactive yesterday    Labs:             9.7    6.94  )-----------( 226      ( 04-28 @ 06:13 )             29.9     04-27 @ 05:15    134  |  102  |  7   ----------------------------<  57  4.0   |  15  |  0.55    Ca    9.0      04-27 @ 05:15    TPro  6.4  /  Alb  3.0  /  TBili  0.4  /  DBili  x   /  AST  61  /  ALT  53  /  AlkPhos  157  04-27 @ 05:15    PT/INR - ( 04-26 @ 00:15 )   PT: 11.4 SEC;   INR: 0.99     PTT - ( 04-26 @ 00:15 )  PTT:20.3 SEC    Uric Acid: (04-28 @ 06:13)  --       Fibrinogen: (04-28 @ 06:13)  825.0    LDH: (04-28 @ 06:13)  196        MEDICATIONS  (STANDING):  cefTRIAXone   IVPB 2000 milliGRAM(s) IV Intermittent every 24 hours  famotidine    Tablet 20 milliGRAM(s) Oral two times a day  folic acid 1 milliGRAM(s) Oral daily  heparin   Injectable 5000 Unit(s) SubCutaneous every 12 hours  prenatal multivitamin 1 Tablet(s) Oral daily  tamsulosin 0.4 milliGRAM(s) Oral at bedtime    MEDICATIONS  (PRN):

## 2020-04-28 NOTE — PROGRESS NOTE ADULT - ASSESSMENT
29/F  @29.1 weeks gestation with no significant PMH/PSH.  Presented with left flank pain, chills, dysuria and sense of incomplete evacuation.  Febrile (101.1). RBUS showed left sided 1cm kidney stone without obstruction. UA positive.    blood culture x2 are negative  urine culture >100K colonies ecoli S to ceftriaxone     1) Pyelonephritis  -clinically improved  - all 4 blood cultures are negative  - on  ceftriaxone 2g q24hrs   - may transition to keflex 500 mg po q 6 h to complete 10 day course  -may be candidate for suppression in view of stone in place    2) R/O Covid-19  - low suspicion and 3 negative results now  - can d/c isolation        Alena Ronquillo MD  Pager: [phone]  After 5 PM or weekends please call fellow on call or office [phone]

## 2020-04-28 NOTE — PROGRESS NOTE ADULT - ASSESSMENT
30 yo P0 at 29w6d admitted for management of sepsis in the setting of pyelonephritis, stable  - pt clinically improved, remains on IV ceftriaxone, Ucx positive for e.coli, ID following and management recommendations appreciated, stable for d/c home, will get clearance from ID and determine PO antibiotic regimen for discharge  - COVID-19 PCR negative however CTA w/ opacities, patient denies cough and saturating well on room air, on isolation  - precautions reviewed

## 2020-04-28 NOTE — PROGRESS NOTE ADULT - SUBJECTIVE AND OBJECTIVE BOX
Follow Up:  29F pregnant admitted with pyelo    Interval History:  feels better.  no back pain.  has not had fever > 48 h      ROS otherwise negative.    Allergies  No Known Allergies    ANTIMICROBIALS:    cefTRIAXone   IVPB 2000 every 24 hours    MEDICATIONS  (STANDING):  heparin   Injectable 5000 every 12 hours  tamsulosin 0.4 at bedtime  PRN    Vital Signs Last 24 Hrs  T(F): 97 (04-28-20 @ 17:11), Max: 98.3 (04-28-20 @ 02:39)  HR: 77 (04-28-20 @ 17:11)  BP: 107/63 (04-28-20 @ 17:11)  RR: 19 (04-28-20 @ 17:11)  SpO2: 97% (04-28-20 @ 17:11) (94% - 98%)    PHYSICAL EXAM:  Constitutional: non-toxic  HEAD/EYES: anicteric  ENT:  supple  Cardiovascular:   S1s2  Respiratory:  clear BS bilaterally  GI:  soft, non-tender, normal bowel sounds, gravid  :   no CVA tenderness  Musculoskeletal:  no synovitis, normal ROM  Neurologic: awake and alert  Skin:  no rash, no erythema, no phlebitis  Psychiatric:  awake, alert, appropriate mood                                   9.7    6.94  )-----------( 226      ( 28 Apr 2020 06:13 )             29.9 04-27    134  |  102  |  7   ----------------------------<  57  4.0   |  15  |  0.55  Ca    9.0      27 Apr 2020 05:15  TPro  6.4  /  Alb  3.0  /  TBili  0.4  /  DBili  x   /  AST  61  /  ALT  53  /  AlkPhos  157  04-27    MICROBIOLOGY:  COVID-19 PCR . (04.26.20 @ 00:47)    COVID-19 PCR: NotDetec    COVID-19 PCR . (04.23.20 @ 14:53)    COVID-19 PCR: NotDetec    COVID-19 PCR . (04.23.20 @ 00:18)    COVID-19 PCR: NotDetec    Culture - Blood (04.25.20 @ 05:50)    Specimen Source: .Blood Blood-Peripheral    Culture Results:   No growth to date.    Culture - Blood (04.25.20 @ 05:50)    Specimen Source: .Blood Blood-Peripheral    Culture Results:   No growth to date.    Culture - Urine (04.25.20 @ 05:15)    Specimen Source: .Urine Clean Catch (Midstream)    Culture Results:   No growth    Culture - Blood (04.23.20 @ 05:46)    Specimen Source: .Blood Blood-Peripheral    Culture Results:   No growth to date.    Culture - Blood (04.23.20 @ 05:46)    Specimen Source: .Blood Blood-Peripheral    Culture Results:   No growth to date.    Culture - Urine (04.23.20 @ 05:06)    -  Amikacin: S <=16    -  Ampicillin: R >16 These ampicillin results predict results for amoxicillin    -  Ampicillin/Sulbactam: I 16/8 Enterobacter, Citrobacter, and Serratia may develop resistance during prolonged therapy (3-4 days)    -  Aztreonam: S <=4    -  Cefazolin: S <=8 (MIC_CL_COM_ENTERIC_CEFAZU) For uncomplicated UTI with K. pneumoniae, E. coli, or P. mirablis: SHANTEL <=16 is sensitive and SHANTEL >=32 is resistant. This also predicts results for oral agents cefaclor, cefdinir, cefpodoxime, cefprozil, cefuroxime axetil, cephalexin and locarbef for uncomplicated UTI. Note that some isolates may be susceptible to these agents while testing resistant to cefazolin.    -  Cefepime: S <=4    -  Cefoxitin: S <=8    -  Ceftriaxone: S <=1 Enterobacter, Citrobacter, and Serratia may develop resistance during prolonged therapy    -  Ciprofloxacin: S <=1    -  Gentamicin: S <=4    -  Imipenem: S <=1    -  Levofloxacin: S <=2    -  Meropenem: S <=1    -  Nitrofurantoin: S <=32 Should not be used to treat pyelonephritis    -  Piperacillin/Tazobactam: S <=16    -  Tigecycline: S <=2    -  Tobramycin: S <=4    -  Trimethoprim/Sulfamethoxazole: S <=2/38    Specimen Source: .Urine Clean Catch (Midstream)    Culture Results:   >100,000 CFU/ml Escherichia coli  Normal Urogenital vladimir present    Organism Identification: Escherichia coli    Organism: Escherichia coli    Method Type: SHANTEL    RADIOLOGY:  4/25 CXR worsening opacity, looks more like fluid overload    Xray Chest 1 View- PORTABLE-Urgent (04.23.20 @ 13:27) >  Impression:  Normal chest radiograph.    US Kidney and Bladder (04.22.20 @ 22:37) >  IMPRESSION:   Nonobstructing 1.0 cm calculus in the mid left kidney. No hydronephrosis.

## 2020-04-28 NOTE — PROGRESS NOTE ADULT - ASSESSMENT
A/P: 30yo  at 29w6d admitted w pyelonephritis, w persistent fevers on IV antibiotics.  HD#4 a, RRT called for persistent tachycardia to 180s, desaturation requiring 6L NC, febrile, w fetal tachycardia.  CXR showed concern for COVID-19 vs fluid overload, 40 lasix given.  Covid19 pcr neg x2 on admission (), w CXR clear, but repeat swab obtained along w covid labs.  Pt also was c/o fluid leaking, unclear if diarrhea vs PPROM, r/o for PPROM. HD#5 pt with resolution of tachycardia. Pt has been afebrile since without complaints.

## 2020-04-30 LAB
CULTURE RESULTS: SIGNIFICANT CHANGE UP
CULTURE RESULTS: SIGNIFICANT CHANGE UP
SPECIMEN SOURCE: SIGNIFICANT CHANGE UP
SPECIMEN SOURCE: SIGNIFICANT CHANGE UP

## 2020-05-05 ENCOUNTER — RESULT REVIEW (OUTPATIENT)
Age: 30
End: 2020-05-05

## 2020-05-05 ENCOUNTER — LABORATORY RESULT (OUTPATIENT)
Age: 30
End: 2020-05-05

## 2020-05-05 ENCOUNTER — NON-APPOINTMENT (OUTPATIENT)
Age: 30
End: 2020-05-05

## 2020-05-05 ENCOUNTER — APPOINTMENT (OUTPATIENT)
Dept: OBGYN | Facility: HOSPITAL | Age: 30
End: 2020-05-05

## 2020-05-05 ENCOUNTER — MED ADMIN CHARGE (OUTPATIENT)
Age: 30
End: 2020-05-05

## 2020-05-05 ENCOUNTER — OUTPATIENT (OUTPATIENT)
Dept: OUTPATIENT SERVICES | Facility: HOSPITAL | Age: 30
LOS: 1 days | End: 2020-05-05
Payer: MEDICAID

## 2020-05-05 VITALS
SYSTOLIC BLOOD PRESSURE: 102 MMHG | DIASTOLIC BLOOD PRESSURE: 60 MMHG | HEIGHT: 62 IN | TEMPERATURE: 97.6 F | HEART RATE: 83 BPM

## 2020-05-05 DIAGNOSIS — Z87.898 PERSONAL HISTORY OF OTHER SPECIFIED CONDITIONS: ICD-10-CM

## 2020-05-05 DIAGNOSIS — Z87.448 PERSONAL HISTORY OF OTHER DISEASES OF URINARY SYSTEM: ICD-10-CM

## 2020-05-05 DIAGNOSIS — Z34.92 ENCOUNTER FOR SUPERVISION OF NORMAL PREGNANCY, UNSPECIFIED, SECOND TRIMESTER: ICD-10-CM

## 2020-05-05 DIAGNOSIS — Z82.49 FAMILY HISTORY OF ISCHEMIC HEART DISEASE AND OTHER DISEASES OF THE CIRCULATORY SYSTEM: ICD-10-CM

## 2020-05-05 DIAGNOSIS — E73.9 LACTOSE INTOLERANCE, UNSPECIFIED: ICD-10-CM

## 2020-05-05 DIAGNOSIS — Z23 ENCOUNTER FOR IMMUNIZATION: ICD-10-CM

## 2020-05-05 LAB
ALBUMIN SERPL ELPH-MCNC: 3.6 G/DL — SIGNIFICANT CHANGE UP (ref 3.3–5)
ALP SERPL-CCNC: 202 U/L — HIGH (ref 40–120)
ALT FLD-CCNC: 78 U/L — HIGH (ref 4–33)
ANION GAP SERPL CALC-SCNC: 17 MMO/L — HIGH (ref 7–14)
APPEARANCE UR: SIGNIFICANT CHANGE UP
AST SERPL-CCNC: 49 U/L — HIGH (ref 4–32)
BACTERIA # UR AUTO: HIGH
BASOPHILS # BLD AUTO: 0.03 K/UL — SIGNIFICANT CHANGE UP (ref 0–0.2)
BASOPHILS NFR BLD AUTO: 0.3 % — SIGNIFICANT CHANGE UP (ref 0–2)
BILIRUB SERPL-MCNC: 0.3 MG/DL — SIGNIFICANT CHANGE UP (ref 0.2–1.2)
BILIRUB UR-MCNC: NEGATIVE — SIGNIFICANT CHANGE UP
BLOOD UR QL VISUAL: NEGATIVE — SIGNIFICANT CHANGE UP
BUN SERPL-MCNC: 6 MG/DL — LOW (ref 7–23)
CALCIUM SERPL-MCNC: 9.4 MG/DL — SIGNIFICANT CHANGE UP (ref 8.4–10.5)
CHLORIDE SERPL-SCNC: 99 MMOL/L — SIGNIFICANT CHANGE UP (ref 98–107)
CO2 SERPL-SCNC: 21 MMOL/L — LOW (ref 22–31)
COLOR SPEC: YELLOW — SIGNIFICANT CHANGE UP
CREAT SERPL-MCNC: 0.43 MG/DL — LOW (ref 0.5–1.3)
EOSINOPHIL # BLD AUTO: 0.07 K/UL — SIGNIFICANT CHANGE UP (ref 0–0.5)
EOSINOPHIL NFR BLD AUTO: 0.6 % — SIGNIFICANT CHANGE UP (ref 0–6)
GLUCOSE PRE/P GLC SERPL-SCNC: 112 — SIGNIFICANT CHANGE UP (ref 65–115)
GLUCOSE SERPL-MCNC: 99 MG/DL — SIGNIFICANT CHANGE UP (ref 70–99)
GLUCOSE UR-MCNC: NEGATIVE — SIGNIFICANT CHANGE UP
HCT VFR BLD CALC: 30.4 % — LOW (ref 34.5–45)
HGB BLD-MCNC: 9.7 G/DL — LOW (ref 11.5–15.5)
HYALINE CASTS # UR AUTO: HIGH
IMM GRANULOCYTES NFR BLD AUTO: 0.9 % — SIGNIFICANT CHANGE UP (ref 0–1.5)
KETONES UR-MCNC: NEGATIVE — SIGNIFICANT CHANGE UP
LEUKOCYTE ESTERASE UR-ACNC: SIGNIFICANT CHANGE UP
LYMPHOCYTES # BLD AUTO: 18.2 % — SIGNIFICANT CHANGE UP (ref 13–44)
LYMPHOCYTES # BLD AUTO: 2.14 K/UL — SIGNIFICANT CHANGE UP (ref 1–3.3)
MCHC RBC-ENTMCNC: 31 PG — SIGNIFICANT CHANGE UP (ref 27–34)
MCHC RBC-ENTMCNC: 31.9 % — LOW (ref 32–36)
MCV RBC AUTO: 97.1 FL — SIGNIFICANT CHANGE UP (ref 80–100)
MONOCYTES # BLD AUTO: 0.38 K/UL — SIGNIFICANT CHANGE UP (ref 0–0.9)
MONOCYTES NFR BLD AUTO: 3.2 % — SIGNIFICANT CHANGE UP (ref 2–14)
NEUTROPHILS # BLD AUTO: 9.02 K/UL — HIGH (ref 1.8–7.4)
NEUTROPHILS NFR BLD AUTO: 76.8 % — SIGNIFICANT CHANGE UP (ref 43–77)
NITRITE UR-MCNC: NEGATIVE — SIGNIFICANT CHANGE UP
NRBC # FLD: 0 K/UL — SIGNIFICANT CHANGE UP (ref 0–0)
PH UR: 6 — SIGNIFICANT CHANGE UP (ref 5–8)
PLATELET # BLD AUTO: 517 K/UL — HIGH (ref 150–400)
PMV BLD: 10.6 FL — SIGNIFICANT CHANGE UP (ref 7–13)
POTASSIUM SERPL-MCNC: 3.5 MMOL/L — SIGNIFICANT CHANGE UP (ref 3.5–5.3)
POTASSIUM SERPL-SCNC: 3.5 MMOL/L — SIGNIFICANT CHANGE UP (ref 3.5–5.3)
PROT SERPL-MCNC: 7.4 G/DL — SIGNIFICANT CHANGE UP (ref 6–8.3)
PROT UR-MCNC: 20 — SIGNIFICANT CHANGE UP
RBC # BLD: 3.13 M/UL — LOW (ref 3.8–5.2)
RBC # FLD: 14 % — SIGNIFICANT CHANGE UP (ref 10.3–14.5)
RBC CASTS # UR COMP ASSIST: SIGNIFICANT CHANGE UP (ref 0–?)
SODIUM SERPL-SCNC: 137 MMOL/L — SIGNIFICANT CHANGE UP (ref 135–145)
SP GR SPEC: 1.02 — SIGNIFICANT CHANGE UP (ref 1–1.04)
SQUAMOUS # UR AUTO: SIGNIFICANT CHANGE UP
URATE SERPL-MCNC: 3.4 MG/DL — SIGNIFICANT CHANGE UP (ref 2.5–7)
UROBILINOGEN FLD QL: NORMAL — SIGNIFICANT CHANGE UP
WBC # BLD: 11.74 K/UL — HIGH (ref 3.8–10.5)
WBC # FLD AUTO: 11.74 K/UL — HIGH (ref 3.8–10.5)
WBC UR QL: >50 — HIGH (ref 0–?)

## 2020-05-05 PROCEDURE — G0452: CPT | Mod: 26

## 2020-05-05 PROCEDURE — 88141 CYTOPATH C/V INTERPRET: CPT

## 2020-05-06 DIAGNOSIS — E55.9 VITAMIN D DEFICIENCY, UNSPECIFIED: ICD-10-CM

## 2020-05-06 LAB
24R-OH-CALCIDIOL SERPL-MCNC: 24.2 NG/ML — LOW (ref 30–80)
CULTURE RESULTS: NO GROWTH — SIGNIFICANT CHANGE UP
HCV AB S/CO SERPL IA: 0.12 S/CO — SIGNIFICANT CHANGE UP (ref 0–0.99)
HCV AB SERPL-IMP: SIGNIFICANT CHANGE UP
HGB A MFR BLD: 96.9 % — SIGNIFICANT CHANGE UP
HGB A2 MFR BLD: 2.8 % — SIGNIFICANT CHANGE UP (ref 2.4–3.5)
HGB ELECT COMMENT: SIGNIFICANT CHANGE UP
HGB F MFR BLD: < 1 % — SIGNIFICANT CHANGE UP (ref 0–1.5)
RUBV IGG SER-ACNC: 2.4 INDEX — SIGNIFICANT CHANGE UP
RUBV IGG SER-IMP: POSITIVE — SIGNIFICANT CHANGE UP
SPECIMEN SOURCE: SIGNIFICANT CHANGE UP
VZV IGG FLD QL IA: 756 INDEX — SIGNIFICANT CHANGE UP
VZV IGG FLD QL IA: POSITIVE — SIGNIFICANT CHANGE UP

## 2020-05-07 LAB
C TRACH RRNA SPEC QL NAA+PROBE: SIGNIFICANT CHANGE UP
GAMMA INTERFERON BACKGROUND BLD IA-ACNC: 0.01 IU/ML — SIGNIFICANT CHANGE UP
LEAD SERPL-MCNC: < 1 UG/DL — SIGNIFICANT CHANGE UP (ref 0–4)
M TB IFN-G BLD-IMP: NEGATIVE — SIGNIFICANT CHANGE UP
M TB IFN-G CD4+ BCKGRND COR BLD-ACNC: 0 IU/ML — SIGNIFICANT CHANGE UP
M TB IFN-G CD4+CD8+ BCKGRND COR BLD-ACNC: 0 IU/ML — SIGNIFICANT CHANGE UP
MEV IGM SER-ACNC: NEGATIVE — SIGNIFICANT CHANGE UP
N GONORRHOEA RRNA SPEC QL NAA+PROBE: SIGNIFICANT CHANGE UP
QUANT TB PLUS MITOGEN MINUS NIL: 5.17 IU/ML — SIGNIFICANT CHANGE UP
SPECIMEN SOURCE: SIGNIFICANT CHANGE UP

## 2020-05-08 LAB — CYTOLOGY SPEC DOC CYTO: SIGNIFICANT CHANGE UP

## 2020-05-12 LAB — CFTR MUT ANL BLD/T: NEGATIVE — SIGNIFICANT CHANGE UP

## 2020-05-13 DIAGNOSIS — O99.213 OBESITY COMPLICATING PREGNANCY, THIRD TRIMESTER: ICD-10-CM

## 2020-05-13 DIAGNOSIS — Z3A.29 29 WEEKS GESTATION OF PREGNANCY: ICD-10-CM

## 2020-05-13 DIAGNOSIS — O26.833 PREGNANCY RELATED RENAL DISEASE, THIRD TRIMESTER: ICD-10-CM

## 2020-05-19 ENCOUNTER — NON-APPOINTMENT (OUTPATIENT)
Age: 30
End: 2020-05-19

## 2020-05-19 ENCOUNTER — OUTPATIENT (OUTPATIENT)
Dept: OUTPATIENT SERVICES | Facility: HOSPITAL | Age: 30
LOS: 1 days | End: 2020-05-19

## 2020-05-19 ENCOUNTER — APPOINTMENT (OUTPATIENT)
Dept: OBGYN | Facility: HOSPITAL | Age: 30
End: 2020-05-19

## 2020-05-19 VITALS
DIASTOLIC BLOOD PRESSURE: 58 MMHG | SYSTOLIC BLOOD PRESSURE: 99 MMHG | TEMPERATURE: 98.1 F | HEART RATE: 81 BPM | WEIGHT: 163 LBS | BODY MASS INDEX: 29.81 KG/M2

## 2020-05-19 DIAGNOSIS — Z34.90 ENCOUNTER FOR SUPERVISION OF NORMAL PREGNANCY, UNSPECIFIED, UNSPECIFIED TRIMESTER: ICD-10-CM

## 2020-06-02 ENCOUNTER — APPOINTMENT (OUTPATIENT)
Dept: ANTEPARTUM | Facility: HOSPITAL | Age: 30
End: 2020-06-02

## 2020-06-02 ENCOUNTER — OUTPATIENT (OUTPATIENT)
Dept: INPATIENT UNIT | Facility: HOSPITAL | Age: 30
LOS: 1 days | Discharge: HOME | End: 2020-06-02

## 2020-06-02 ENCOUNTER — ASOB RESULT (OUTPATIENT)
Age: 30
End: 2020-06-02

## 2020-06-02 VITALS — HEART RATE: 57 BPM | DIASTOLIC BLOOD PRESSURE: 65 MMHG | SYSTOLIC BLOOD PRESSURE: 108 MMHG

## 2020-06-02 VITALS
RESPIRATION RATE: 18 BRPM | HEART RATE: 67 BPM | SYSTOLIC BLOOD PRESSURE: 100 MMHG | DIASTOLIC BLOOD PRESSURE: 68 MMHG | TEMPERATURE: 98 F

## 2020-06-02 DIAGNOSIS — O26.899 OTHER SPECIFIED PREGNANCY RELATED CONDITIONS, UNSPECIFIED TRIMESTER: ICD-10-CM

## 2020-06-02 DIAGNOSIS — Z3A.00 WEEKS OF GESTATION OF PREGNANCY NOT SPECIFIED: ICD-10-CM

## 2020-06-02 LAB
ALBUMIN SERPL ELPH-MCNC: 3.3 G/DL — SIGNIFICANT CHANGE UP (ref 3.3–5)
ALP SERPL-CCNC: 175 U/L — HIGH (ref 40–120)
ALT FLD-CCNC: 72 U/L — HIGH (ref 4–33)
ANION GAP SERPL CALC-SCNC: 12 MMO/L — SIGNIFICANT CHANGE UP (ref 7–14)
APPEARANCE UR: SIGNIFICANT CHANGE UP
APTT BLD: 30.8 SEC — SIGNIFICANT CHANGE UP (ref 27.5–36.3)
AST SERPL-CCNC: 33 U/L — HIGH (ref 4–32)
BACTERIA # UR AUTO: HIGH
BASOPHILS # BLD AUTO: 0.02 K/UL — SIGNIFICANT CHANGE UP (ref 0–0.2)
BASOPHILS NFR BLD AUTO: 0.2 % — SIGNIFICANT CHANGE UP (ref 0–2)
BILIRUB SERPL-MCNC: < 0.2 MG/DL — LOW (ref 0.2–1.2)
BILIRUB UR-MCNC: NEGATIVE — SIGNIFICANT CHANGE UP
BLOOD UR QL VISUAL: NEGATIVE — SIGNIFICANT CHANGE UP
BUN SERPL-MCNC: 9 MG/DL — SIGNIFICANT CHANGE UP (ref 7–23)
CALCIUM SERPL-MCNC: 9.2 MG/DL — SIGNIFICANT CHANGE UP (ref 8.4–10.5)
CHLORIDE SERPL-SCNC: 101 MMOL/L — SIGNIFICANT CHANGE UP (ref 98–107)
CO2 SERPL-SCNC: 20 MMOL/L — LOW (ref 22–31)
COLOR SPEC: YELLOW — SIGNIFICANT CHANGE UP
CREAT SERPL-MCNC: 0.45 MG/DL — LOW (ref 0.5–1.3)
EOSINOPHIL # BLD AUTO: 0.16 K/UL — SIGNIFICANT CHANGE UP (ref 0–0.5)
EOSINOPHIL NFR BLD AUTO: 1.6 % — SIGNIFICANT CHANGE UP (ref 0–6)
FIBRINOGEN PPP-MCNC: 835 MG/DL — HIGH (ref 300–520)
GLUCOSE SERPL-MCNC: 84 MG/DL — SIGNIFICANT CHANGE UP (ref 70–99)
GLUCOSE UR-MCNC: NEGATIVE — SIGNIFICANT CHANGE UP
HAV IGM SER-ACNC: NONREACTIVE — SIGNIFICANT CHANGE UP
HBV CORE IGM SER-ACNC: NONREACTIVE — SIGNIFICANT CHANGE UP
HBV SURFACE AG SER-ACNC: NONREACTIVE — SIGNIFICANT CHANGE UP
HCT VFR BLD CALC: 31.8 % — LOW (ref 34.5–45)
HCV AB S/CO SERPL IA: 0.13 S/CO — SIGNIFICANT CHANGE UP (ref 0–0.99)
HCV AB SERPL-IMP: SIGNIFICANT CHANGE UP
HGB BLD-MCNC: 10.1 G/DL — LOW (ref 11.5–15.5)
HYALINE CASTS # UR AUTO: HIGH
IMM GRANULOCYTES NFR BLD AUTO: 0.5 % — SIGNIFICANT CHANGE UP (ref 0–1.5)
INR BLD: 1.03 — SIGNIFICANT CHANGE UP (ref 0.88–1.17)
KETONES UR-MCNC: NEGATIVE — SIGNIFICANT CHANGE UP
LEUKOCYTE ESTERASE UR-ACNC: SIGNIFICANT CHANGE UP
LYMPHOCYTES # BLD AUTO: 1.98 K/UL — SIGNIFICANT CHANGE UP (ref 1–3.3)
LYMPHOCYTES # BLD AUTO: 20.1 % — SIGNIFICANT CHANGE UP (ref 13–44)
MCHC RBC-ENTMCNC: 29.5 PG — SIGNIFICANT CHANGE UP (ref 27–34)
MCHC RBC-ENTMCNC: 31.8 % — LOW (ref 32–36)
MCV RBC AUTO: 93 FL — SIGNIFICANT CHANGE UP (ref 80–100)
MONOCYTES # BLD AUTO: 0.34 K/UL — SIGNIFICANT CHANGE UP (ref 0–0.9)
MONOCYTES NFR BLD AUTO: 3.5 % — SIGNIFICANT CHANGE UP (ref 2–14)
NEUTROPHILS # BLD AUTO: 7.3 K/UL — SIGNIFICANT CHANGE UP (ref 1.8–7.4)
NEUTROPHILS NFR BLD AUTO: 74.1 % — SIGNIFICANT CHANGE UP (ref 43–77)
NITRITE UR-MCNC: POSITIVE — HIGH
NRBC # FLD: 0 K/UL — SIGNIFICANT CHANGE UP (ref 0–0)
PH UR: 6 — SIGNIFICANT CHANGE UP (ref 5–8)
PLATELET # BLD AUTO: 267 K/UL — SIGNIFICANT CHANGE UP (ref 150–400)
PMV BLD: 11.3 FL — SIGNIFICANT CHANGE UP (ref 7–13)
POTASSIUM SERPL-MCNC: 3.8 MMOL/L — SIGNIFICANT CHANGE UP (ref 3.5–5.3)
POTASSIUM SERPL-SCNC: 3.8 MMOL/L — SIGNIFICANT CHANGE UP (ref 3.5–5.3)
PROT SERPL-MCNC: 6.7 G/DL — SIGNIFICANT CHANGE UP (ref 6–8.3)
PROT UR-MCNC: 20 — SIGNIFICANT CHANGE UP
PROTHROM AB SERPL-ACNC: 11.7 SEC — SIGNIFICANT CHANGE UP (ref 9.8–13.1)
RBC # BLD: 3.42 M/UL — LOW (ref 3.8–5.2)
RBC # FLD: 13.6 % — SIGNIFICANT CHANGE UP (ref 10.3–14.5)
RBC CASTS # UR COMP ASSIST: SIGNIFICANT CHANGE UP (ref 0–?)
SODIUM SERPL-SCNC: 133 MMOL/L — LOW (ref 135–145)
SP GR SPEC: 1.02 — SIGNIFICANT CHANGE UP (ref 1–1.04)
SQUAMOUS # UR AUTO: SIGNIFICANT CHANGE UP
UROBILINOGEN FLD QL: NORMAL — SIGNIFICANT CHANGE UP
WBC # BLD: 9.85 K/UL — SIGNIFICANT CHANGE UP (ref 3.8–10.5)
WBC # FLD AUTO: 9.85 K/UL — SIGNIFICANT CHANGE UP (ref 3.8–10.5)
WBC UR QL: >50 — HIGH (ref 0–?)

## 2020-06-02 RX ORDER — CEFTRIAXONE 500 MG/1
1000 INJECTION, POWDER, FOR SOLUTION INTRAMUSCULAR; INTRAVENOUS ONCE
Refills: 0 | Status: COMPLETED | OUTPATIENT
Start: 2020-06-02 | End: 2020-06-02

## 2020-06-02 RX ORDER — CEPHALEXIN 500 MG
1 CAPSULE ORAL
Qty: 28 | Refills: 0
Start: 2020-06-02 | End: 2020-06-08

## 2020-06-02 RX ORDER — URSODIOL 250 MG/1
1 TABLET, FILM COATED ORAL
Qty: 28 | Refills: 1
Start: 2020-06-02 | End: 2020-06-29

## 2020-06-02 RX ADMIN — CEFTRIAXONE 100 MILLIGRAM(S): 500 INJECTION, POWDER, FOR SOLUTION INTRAMUSCULAR; INTRAVENOUS at 11:34

## 2020-06-02 NOTE — OB PROVIDER TRIAGE NOTE - HISTORY OF PRESENT ILLNESS
Patient is a 30yo G1 at 34w6d presenting for pruritus of palms and soles and decreased fetal movement. Patient reports new onset pruritus of palms and soles that began in the past two weeks and is worse at night. She also reports decreased fetal movement yesterday, but states the baby has been moving today. Patient additionally states that lately her urine has a strong smell but she denies dysuria, frequency, hematuria or urgency.    Of note, patient was admitted from 4/23-4/28 with sepsis from suspected left pyelonephritis and a non-obstructing 1cm left renal stone. Patient was treated with IV CTX and sent home with a PO course of keflex, which she completed two weeks ago. ID was consulted and patient was ruled out for COVID with 3 negative PCRs and a normal CXR. Hospital course was complicated by an RRT on 4/26. Due to persistent tachycardia, patient had CT Angio which showed bilateral patchy opacities and TTE that was normal (EF 65%). Patient followed up in clinic on 5/5 where repeat UA showed >50 WBC, neg nitrites and large LE, but urine culture was negative for bacteria. A CMP was drawn, which showed an elevated AST/ALT of 49/78. Plan was for repeat CMP at follow up appointment on 5/19 but patient refused bloodwork at that visit. She has her next follow up scheduled for 6/8.     Pob: current pregnancy  Pgyn: denies h/o STIs  PMH: left pyelonephritis, left nephrolithiasis  PSH: none  Meds: iron, PNV, vit D  All: NKDA  Social: endorses h/o smoking socially prior to pregnancy. Denies use of alcohol or drugs during pregnancy.

## 2020-06-02 NOTE — OB RN TRIAGE NOTE - LANGUAGE ASSISTANCE NEEDED
No-Patient/Caregiver offered and refused free interpretation services. No-Patient/Caregiver offered and refused free interpretation services./Dr landaverde attending

## 2020-06-02 NOTE — OB PROVIDER TRIAGE NOTE - NSOBPROVIDERNOTE_OBGYN_ALL_OB_FT
A/P: Patient is a 28yo G1 at 34w6d presenting for pruritus of palms and soles and decreased fetal movement. Patient's symptoms and recently elevated LFTs raise suspicion for cholestasis of pregnancy. Patient has reassuring fetal status by sonographic criteria with a normal BPP and with subjective increase in fetal movement while in triage. As patient reports a strong odor in her urine but a normal physical exam with no CVA tenderness, will evaluate for a possible UTI. No evidence of pyelonephritis at this time.  - CBC, CMP, Hepatitis panel and bile acids sent  - UA and Urine culture sent  - Continue to monitor    D/w Clover Merida, PGY3 A/P: Patient is a 28yo G1 at 34w6d presenting for pruritus of palms and soles and decreased fetal movement. Patient's symptoms and recently elevated LFTs raise suspicion for cholestasis of pregnancy. Patient has reassuring fetal status by sonographic criteria with a normal BPP and with subjective increase in fetal movement while in triage. As patient reports a strong odor in her urine but a normal physical exam with no CVA tenderness, will evaluate for a possible UTI. No evidence of pyelonephritis at this time.  - CBC, CMP, Hepatitis panel and bile acids sent  - UA and Urine culture sent  - Continue to monitor    D/w Clover Merida, PGY3    Update 11:30am:   Bedside BPP at approximately 9am: BPP 8/8, vertex, JOSELINE 14.6cm    Labs reviewed and notable for WBC 9.85, normal platelets (267), AST/ALT 33/72 (stable from 49/78 on 5/5). UA shows evidence of UTI with positive nitrites, large LE, bacteria and WBC. Hepatitis panel and bile acids were sent to core lab and will take 2-3 days to result.     ATU sono (6/2): vertex, EFW 5lb7oz (2457gm, 34%ile), FHR 130bpm    Findings reviewed with Dr. Solis. Patient's UA shows evidence of UTI. Will give one dose of IV CTX and prescribe keflex 500mg q6h x 7 days. Will start suppressive UTI therapy after patient finishes keflex with macrobid 100mg daily. Given high suspicion for cholestasis of pregnancy, will prescribe ursodiol 300mg BID. Patient to follow up in high risk clinic on Monday 6/8. [facility] clinic called to schedule patient for appointment.    Plan to discharge patient home with labor and decreased fetal movement precautions.    Patient seen and discussed with Dr. Irma Walters, PGY1

## 2020-06-02 NOTE — OB PROVIDER TRIAGE NOTE - NSHPPHYSICALEXAM_GEN_ALL_CORE
Vitals: /68     HR  67      PE:  - GEN: NAD  - CV: extremities well-perfused  - LUNGS: breathing comfortably  - ABD: soft, nontender, gravid  - EXT: no LE edema or erythema    SVE: deferred  EFM: 140bpm/mod shira/+accels/-decels  TOCO: no ctx    BEDSIDE SONO: vertex, BPP 8/8, JOSELINE 14.6cm    ATU Sono 4/24: cephalic, JOSELINE 16.86cm, BPP 8/8, EFW 1336gm (31%) Vitals: /68     HR  67      PE:  - GEN: NAD  - CV: extremities well-perfused  - LUNGS: breathing comfortably  - ABD: soft, nontender, gravid  - : no CVA tenderness  - EXT: no LE edema or erythema    SVE: deferred  EFM: 140bpm/mod shira/+accels/-decels  TOCO: no ctx    BEDSIDE SONO: vertex, BPP 8/8, JOSELINE 14.6cm    ATU Sono 4/24: cephalic, JOSELINE 16.86cm, BPP 8/8, EFW 1336gm (31%)

## 2020-06-02 NOTE — OB RN TRIAGE NOTE - PMH
<<----- Click to add NO pertinent Past Medical History No pertinent past medical history Pyelonephritis affecting pregnancy  admit 4/23/2020 rx with cephtriaxone

## 2020-06-02 NOTE — CHART NOTE - NSCHARTNOTEFT_GEN_A_CORE
Backchart from 821    [facility]  PCAP patient  at 34.6 weeks presented at 0821 with complaints of decreased fetal movement since yesterday and itching palms and warm feet x 1 week, worse in the past 2-3 days.  denies LOF, VB or contractions  Patient brought immediately to quick look for FH check. FHR 150s audible on EFM  patient escorted to DT4 at 0835  Report given to RN and resident    DANIA Holley NP

## 2020-06-02 NOTE — OB PROVIDER TRIAGE NOTE - ADDITIONAL INSTRUCTIONS
Usted recibio imani dosis del antibiotico Ceftriaxone en el hospital para la infeccion de grove via urinaria.    Para la infeccion urinaria: Keflex 500mg cada 6 horas por 7 woodward (6am, 12 mediodia, 6pm, medianoche). Cuando termine keflex, comienze a graeme Macrobid 100mg imani vez al kim hasta el kim que le toque el parto.    Para la picazon: Ursodiol 300mg dos veces al kim.

## 2020-06-03 DIAGNOSIS — N12 TUBULO-INTERSTITIAL NEPHRITIS, NOT SPECIFIED AS ACUTE OR CHRONIC: ICD-10-CM

## 2020-06-03 LAB — BILE AC FLD-MCNC: 4.7 UMOL/L — SIGNIFICANT CHANGE UP (ref 0–10)

## 2020-06-08 ENCOUNTER — APPOINTMENT (OUTPATIENT)
Dept: ANTEPARTUM | Facility: CLINIC | Age: 30
End: 2020-06-08

## 2020-06-08 ENCOUNTER — LABORATORY RESULT (OUTPATIENT)
Age: 30
End: 2020-06-08

## 2020-06-08 ENCOUNTER — OUTPATIENT (OUTPATIENT)
Dept: OUTPATIENT SERVICES | Facility: HOSPITAL | Age: 30
LOS: 1 days | End: 2020-06-08

## 2020-06-08 ENCOUNTER — APPOINTMENT (OUTPATIENT)
Dept: OBGYN | Facility: HOSPITAL | Age: 30
End: 2020-06-08
Payer: MEDICAID

## 2020-06-08 ENCOUNTER — RESULT REVIEW (OUTPATIENT)
Age: 30
End: 2020-06-08

## 2020-06-08 ENCOUNTER — NON-APPOINTMENT (OUTPATIENT)
Age: 30
End: 2020-06-08

## 2020-06-08 ENCOUNTER — APPOINTMENT (OUTPATIENT)
Dept: OBGYN | Facility: HOSPITAL | Age: 30
End: 2020-06-08

## 2020-06-08 VITALS
SYSTOLIC BLOOD PRESSURE: 110 MMHG | TEMPERATURE: 98.2 F | DIASTOLIC BLOOD PRESSURE: 63 MMHG | HEIGHT: 62 IN | BODY MASS INDEX: 30.73 KG/M2 | HEART RATE: 83 BPM | WEIGHT: 167 LBS

## 2020-06-08 PROBLEM — O23.00: Chronic | Status: ACTIVE | Noted: 2020-06-02

## 2020-06-08 LAB
ALBUMIN SERPL ELPH-MCNC: 3.7 G/DL — SIGNIFICANT CHANGE UP (ref 3.3–5)
ALP SERPL-CCNC: 189 U/L — HIGH (ref 40–120)
ALT FLD-CCNC: 81 U/L — HIGH (ref 4–33)
ANION GAP SERPL CALC-SCNC: 14 MMO/L — SIGNIFICANT CHANGE UP (ref 7–14)
AST SERPL-CCNC: 40 U/L — HIGH (ref 4–32)
BILIRUB SERPL-MCNC: < 0.2 MG/DL — LOW (ref 0.2–1.2)
BUN SERPL-MCNC: 8 MG/DL — SIGNIFICANT CHANGE UP (ref 7–23)
CALCIUM SERPL-MCNC: 9.1 MG/DL — SIGNIFICANT CHANGE UP (ref 8.4–10.5)
CHLORIDE SERPL-SCNC: 100 MMOL/L — SIGNIFICANT CHANGE UP (ref 98–107)
CO2 SERPL-SCNC: 21 MMOL/L — LOW (ref 22–31)
CREAT SERPL-MCNC: 0.47 MG/DL — LOW (ref 0.5–1.3)
GLUCOSE SERPL-MCNC: 58 MG/DL — LOW (ref 70–99)
POTASSIUM SERPL-MCNC: 3.9 MMOL/L — SIGNIFICANT CHANGE UP (ref 3.5–5.3)
POTASSIUM SERPL-SCNC: 3.9 MMOL/L — SIGNIFICANT CHANGE UP (ref 3.5–5.3)
PROT SERPL-MCNC: 7 G/DL — SIGNIFICANT CHANGE UP (ref 6–8.3)
SODIUM SERPL-SCNC: 135 MMOL/L — SIGNIFICANT CHANGE UP (ref 135–145)

## 2020-06-08 PROCEDURE — 99213 OFFICE O/P EST LOW 20 MIN: CPT | Mod: TH,GE,25

## 2020-06-09 ENCOUNTER — NON-APPOINTMENT (OUTPATIENT)
Age: 30
End: 2020-06-09

## 2020-06-09 RX ORDER — NITROFURANTOIN MACROCRYSTAL 50 MG
1 CAPSULE ORAL
Qty: 28 | Refills: 1
Start: 2020-06-09 | End: 2020-08-03

## 2020-06-10 DIAGNOSIS — Z34.90 ENCOUNTER FOR SUPERVISION OF NORMAL PREGNANCY, UNSPECIFIED, UNSPECIFIED TRIMESTER: ICD-10-CM

## 2020-06-10 LAB
C TRACH RRNA SPEC QL NAA+PROBE: SIGNIFICANT CHANGE UP
CULTURE RESULTS: SIGNIFICANT CHANGE UP
N GONORRHOEA RRNA SPEC QL NAA+PROBE: SIGNIFICANT CHANGE UP
SPECIMEN SOURCE: SIGNIFICANT CHANGE UP
SPECIMEN SOURCE: SIGNIFICANT CHANGE UP

## 2020-06-11 ENCOUNTER — APPOINTMENT (OUTPATIENT)
Dept: OBGYN | Facility: HOSPITAL | Age: 30
End: 2020-06-11

## 2020-06-15 ENCOUNTER — NON-APPOINTMENT (OUTPATIENT)
Age: 30
End: 2020-06-15

## 2020-06-15 ENCOUNTER — OUTPATIENT (OUTPATIENT)
Dept: OUTPATIENT SERVICES | Facility: HOSPITAL | Age: 30
LOS: 1 days | End: 2020-06-15

## 2020-06-15 ENCOUNTER — APPOINTMENT (OUTPATIENT)
Dept: OBGYN | Facility: HOSPITAL | Age: 30
End: 2020-06-15
Payer: MEDICAID

## 2020-06-15 VITALS
WEIGHT: 166 LBS | HEIGHT: 62 IN | SYSTOLIC BLOOD PRESSURE: 112 MMHG | BODY MASS INDEX: 30.55 KG/M2 | DIASTOLIC BLOOD PRESSURE: 63 MMHG | TEMPERATURE: 98.2 F | HEART RATE: 74 BPM

## 2020-06-15 PROCEDURE — 99213 OFFICE O/P EST LOW 20 MIN: CPT | Mod: TH,GC,25

## 2020-06-16 DIAGNOSIS — O09.90 SUPERVISION OF HIGH RISK PREGNANCY, UNSPECIFIED, UNSPECIFIED TRIMESTER: ICD-10-CM

## 2020-06-20 ENCOUNTER — INPATIENT (INPATIENT)
Facility: HOSPITAL | Age: 30
LOS: 4 days | Discharge: HOME | End: 2020-06-25
Attending: SPECIALIST | Admitting: SPECIALIST
Payer: MEDICAID

## 2020-06-20 VITALS
SYSTOLIC BLOOD PRESSURE: 113 MMHG | TEMPERATURE: 102 F | HEART RATE: 117 BPM | RESPIRATION RATE: 16 BRPM | DIASTOLIC BLOOD PRESSURE: 69 MMHG

## 2020-06-20 DIAGNOSIS — O26.899 OTHER SPECIFIED PREGNANCY RELATED CONDITIONS, UNSPECIFIED TRIMESTER: ICD-10-CM

## 2020-06-20 DIAGNOSIS — Z3A.00 WEEKS OF GESTATION OF PREGNANCY NOT SPECIFIED: ICD-10-CM

## 2020-06-20 LAB
ALBUMIN SERPL ELPH-MCNC: 3.2 G/DL — LOW (ref 3.3–5)
ALP SERPL-CCNC: 272 U/L — HIGH (ref 40–120)
ALT FLD-CCNC: 64 U/L — HIGH (ref 4–33)
ANION GAP SERPL CALC-SCNC: 16 MMO/L — HIGH (ref 7–14)
APPEARANCE UR: SIGNIFICANT CHANGE UP
AST SERPL-CCNC: 49 U/L — HIGH (ref 4–32)
BACTERIA # UR AUTO: HIGH
BILIRUB SERPL-MCNC: 0.3 MG/DL — SIGNIFICANT CHANGE UP (ref 0.2–1.2)
BILIRUB UR-MCNC: NEGATIVE — SIGNIFICANT CHANGE UP
BLOOD UR QL VISUAL: NEGATIVE — SIGNIFICANT CHANGE UP
BUN SERPL-MCNC: 8 MG/DL — SIGNIFICANT CHANGE UP (ref 7–23)
CALCIUM SERPL-MCNC: 8.9 MG/DL — SIGNIFICANT CHANGE UP (ref 8.4–10.5)
CHLORIDE SERPL-SCNC: 99 MMOL/L — SIGNIFICANT CHANGE UP (ref 98–107)
CO2 SERPL-SCNC: 18 MMOL/L — LOW (ref 22–31)
COLOR SPEC: SIGNIFICANT CHANGE UP
CREAT SERPL-MCNC: 0.63 MG/DL — SIGNIFICANT CHANGE UP (ref 0.5–1.3)
GLUCOSE SERPL-MCNC: 114 MG/DL — HIGH (ref 70–99)
GLUCOSE UR-MCNC: NEGATIVE — SIGNIFICANT CHANGE UP
HCT VFR BLD CALC: 34.5 % — SIGNIFICANT CHANGE UP (ref 34.5–45)
HGB BLD-MCNC: 11.2 G/DL — LOW (ref 11.5–15.5)
HYALINE CASTS # UR AUTO: NEGATIVE — SIGNIFICANT CHANGE UP
KETONES UR-MCNC: NEGATIVE — SIGNIFICANT CHANGE UP
LACTATE SERPL-SCNC: 1 MMOL/L — SIGNIFICANT CHANGE UP (ref 0.5–2)
LEUKOCYTE ESTERASE UR-ACNC: SIGNIFICANT CHANGE UP
MCHC RBC-ENTMCNC: 29.6 PG — SIGNIFICANT CHANGE UP (ref 27–34)
MCHC RBC-ENTMCNC: 32.5 % — SIGNIFICANT CHANGE UP (ref 32–36)
MCV RBC AUTO: 91 FL — SIGNIFICANT CHANGE UP (ref 80–100)
NITRITE UR-MCNC: NEGATIVE — SIGNIFICANT CHANGE UP
NRBC # FLD: 0 K/UL — SIGNIFICANT CHANGE UP (ref 0–0)
PH UR: 7.5 — SIGNIFICANT CHANGE UP (ref 5–8)
PLATELET # BLD AUTO: 237 K/UL — SIGNIFICANT CHANGE UP (ref 150–400)
PMV BLD: 11.4 FL — SIGNIFICANT CHANGE UP (ref 7–13)
POTASSIUM SERPL-MCNC: 3.8 MMOL/L — SIGNIFICANT CHANGE UP (ref 3.5–5.3)
POTASSIUM SERPL-SCNC: 3.8 MMOL/L — SIGNIFICANT CHANGE UP (ref 3.5–5.3)
PROT SERPL-MCNC: 6.9 G/DL — SIGNIFICANT CHANGE UP (ref 6–8.3)
PROT UR-MCNC: 10 — SIGNIFICANT CHANGE UP
RBC # BLD: 3.79 M/UL — LOW (ref 3.8–5.2)
RBC # FLD: 13.4 % — SIGNIFICANT CHANGE UP (ref 10.3–14.5)
RBC CASTS # UR COMP ASSIST: SIGNIFICANT CHANGE UP (ref 0–?)
SODIUM SERPL-SCNC: 133 MMOL/L — LOW (ref 135–145)
SP GR SPEC: 1.01 — SIGNIFICANT CHANGE UP (ref 1–1.04)
SQUAMOUS # UR AUTO: SIGNIFICANT CHANGE UP
UROBILINOGEN FLD QL: NORMAL — SIGNIFICANT CHANGE UP
WBC # BLD: 11.72 K/UL — HIGH (ref 3.8–10.5)
WBC # FLD AUTO: 11.72 K/UL — HIGH (ref 3.8–10.5)
WBC UR QL: HIGH (ref 0–?)

## 2020-06-20 RX ORDER — SODIUM CHLORIDE 9 MG/ML
500 INJECTION, SOLUTION INTRAVENOUS ONCE
Refills: 0 | Status: COMPLETED | OUTPATIENT
Start: 2020-06-20 | End: 2020-06-21

## 2020-06-20 RX ORDER — SODIUM CHLORIDE 9 MG/ML
1000 INJECTION, SOLUTION INTRAVENOUS
Refills: 0 | Status: DISCONTINUED | OUTPATIENT
Start: 2020-06-20 | End: 2020-06-22

## 2020-06-20 RX ORDER — ACETAMINOPHEN 500 MG
975 TABLET ORAL ONCE
Refills: 0 | Status: COMPLETED | OUTPATIENT
Start: 2020-06-20 | End: 2020-06-20

## 2020-06-20 RX ORDER — CEFAZOLIN SODIUM 1 G
2000 VIAL (EA) INJECTION ONCE
Refills: 0 | Status: DISCONTINUED | OUTPATIENT
Start: 2020-06-20 | End: 2020-06-21

## 2020-06-20 RX ORDER — OXYCODONE HYDROCHLORIDE 5 MG/1
5 TABLET ORAL EVERY 6 HOURS
Refills: 0 | Status: DISCONTINUED | OUTPATIENT
Start: 2020-06-20 | End: 2020-06-22

## 2020-06-20 RX ORDER — SODIUM CHLORIDE 9 MG/ML
1000 INJECTION, SOLUTION INTRAVENOUS
Refills: 0 | Status: DISCONTINUED | OUTPATIENT
Start: 2020-06-20 | End: 2020-06-20

## 2020-06-20 RX ORDER — SODIUM CHLORIDE 9 MG/ML
500 INJECTION, SOLUTION INTRAVENOUS ONCE
Refills: 0 | Status: COMPLETED | OUTPATIENT
Start: 2020-06-20 | End: 2020-06-20

## 2020-06-20 RX ORDER — CEFAZOLIN SODIUM 1 G
VIAL (EA) INJECTION
Refills: 0 | Status: DISCONTINUED | OUTPATIENT
Start: 2020-06-20 | End: 2020-06-21

## 2020-06-20 RX ORDER — CEFAZOLIN SODIUM 1 G
2000 VIAL (EA) INJECTION EVERY 8 HOURS
Refills: 0 | Status: DISCONTINUED | OUTPATIENT
Start: 2020-06-21 | End: 2020-06-21

## 2020-06-20 RX ORDER — ACETAMINOPHEN 500 MG
975 TABLET ORAL EVERY 6 HOURS
Refills: 0 | Status: DISCONTINUED | OUTPATIENT
Start: 2020-06-20 | End: 2020-06-21

## 2020-06-20 RX ADMIN — SODIUM CHLORIDE 500 MILLILITER(S): 9 INJECTION, SOLUTION INTRAVENOUS at 22:00

## 2020-06-20 RX ADMIN — Medication 975 MILLIGRAM(S): at 22:45

## 2020-06-20 NOTE — OB PROVIDER H&P - NSHPPHYSICALEXAM_GEN_ALL_CORE
Vital Signs Last 24 Hrs  T(C): 38.9 (20 Jun 2020 21:43), Max: 38.9 (20 Jun 2020 21:33)  T(F): 102.02 (20 Jun 2020 21:43), Max: 102.02 (20 Jun 2020 21:43)  HR: 97 (20 Jun 2020 23:10) (93 - 119)  BP: 113/69 (20 Jun 2020 21:52) (113/69 - 113/69)  BP(mean): --  RR: 16 (20 Jun 2020 21:33) (16 - 16)  SpO2: 97% (20 Jun 2020 23:10) (97% - 100%)    PE:  - GEN: NAD  - CV: extremities well-perfused  - LUNGS: breathing comfortably  - ABD: soft, nontender, gravid  - : positive CVA tenderness on left side  - EXT: no LE edema or erythema  - skin: right bottom with 3x4 cm burn blister healing    SVE: L/C/P  EFM: 170 bpm/mod shira/+accels/-decels  TOCO: no ctx    BEDSIDE SONO: vertex, BPP 8/8, JOSELINE 15. 5 cm, Vertex presentation, Anterior placenta    ATU Sono 4/24: cephalic, JOSELINE 16.86cm, BPP 8/8, EFW 1336gm (31%)

## 2020-06-20 NOTE — OB PROVIDER TRIAGE NOTE - NSHPPHYSICALEXAM_GEN_ALL_CORE
Vitals: /68     HR  67      PE:  - GEN: NAD  - CV: extremities well-perfused  - LUNGS: breathing comfortably  - ABD: soft, nontender, gravid  - : no CVA tenderness  - EXT: no LE edema or erythema    SVE: deferred  EFM: 140bpm/mod shira/+accels/-decels  TOCO: no ctx    BEDSIDE SONO: vertex, BPP 8/8, JOSELINE 14.6cm    ATU Sono 4/24: cephalic, JOSELINE 16.86cm, BPP 8/8, EFW 1336gm (31%) Vital Signs Last 24 Hrs  T(C): 38.9 (20 Jun 2020 21:43), Max: 38.9 (20 Jun 2020 21:33)  T(F): 102.02 (20 Jun 2020 21:43), Max: 102.02 (20 Jun 2020 21:43)  HR: 97 (20 Jun 2020 23:10) (93 - 119)  BP: 113/69 (20 Jun 2020 21:52) (113/69 - 113/69)  BP(mean): --  RR: 16 (20 Jun 2020 21:33) (16 - 16)  SpO2: 97% (20 Jun 2020 23:10) (97% - 100%)    PE:  - GEN: NAD  - CV: extremities well-perfused  - LUNGS: breathing comfortably  - ABD: soft, nontender, gravid  - : positive CVA tenderness on left side  - EXT: no LE edema or erythema  - skin: right bottom with 3x4 cm burn blister healing    SVE: L/C/P  EFM: 170 bpm/mod shira/+accels/-decels  TOCO: no ctx    BEDSIDE SONO: vertex, BPP 8/8, JOSELINE 15. 5 cm, Vertex presentation, Anterior placenta    ATU Sono 4/24: cephalic, JOSELINE 16.86cm, BPP 8/8, EFW 1336gm (31%)

## 2020-06-20 NOTE — OB PROVIDER H&P - ASSESSMENT
Patient is a 30yo G1 at 37.3 wks presents with left flank pain since yesterday, currently 6/10 on pain scale. Denies VB/LOF/Ctx. Reports positive fetal movement GBS negative on 6/8. Patient also has 3x4 burn blister to right bottom, cause for leakage of hot water used as warm compress for back pain.  PNI: June 2:  presenting for pruritus of palms and soles, treated for suspected cholestasis started on ursodiol Bile acids 4.7., also treated for positive UTI with Keflex          April 23-28 admitted to MICU with sepsis from suspected left pyelonephritis and a non-obstructing 1cm left renal stone. Patient was treated with IV CTX and sent home with a PO course of keflex, which she completed two weeks ago. ID was consulted and patient was ruled out for  COVID with 3 negative PCRs and a normal CXR. Hospital course was complicated by an RRT on 4/26. Due to persistent tachycardia, patient had CT Angio which showed bilateral patchy opacities and TTE that was normal (EF 65%).      Pob: current pregnancy  Pgyn: denies h/o STIs  PMH: left pyelonephritis, left nephrolithiasis  PSH: none  Meds: iron, PNV, vit D  All: NKDA  Social: endorses h/o smoking socially prior to pregnancy. Denies use of alcohol or drugs during pregnancy.    Vital Signs Last 24 Hrs  T(C): 38.9 (20 Jun 2020 21:43), Max: 38.9 (20 Jun 2020 21:33)  T(F): 102.02 (20 Jun 2020 21:43), Max: 102.02 (20 Jun 2020 21:43)  HR: 97 (20 Jun 2020 23:10) (93 - 119)  BP: 113/69 (20 Jun 2020 21:52) (113/69 - 113/69)  BP(mean): --  RR: 16 (20 Jun 2020 21:33) (16 - 16)  SpO2: 97% (20 Jun 2020 23:10) (97% - 100%)    PE:  - GEN: NAD  - CV: extremities well-perfused  - LUNGS: breathing comfortably  - ABD: soft, nontender, gravid  - : positive CVA tenderness on left side  - EXT: no LE edema or erythema  - skin: right bottom with 3x4 cm burn blister healing    SVE: L/C/P  EFM: 170 bpm/mod shira/+accels/-decels  TOCO: no ctx    BEDSIDE SONO: vertex, BPP 8/8, JOSELINE 15. 5 cm, Vertex presentation, Anterior placenta    ATU Sono 4/24: cephalic, JOSELINE 16.86cm, BPP 8/8, EFW 1336gm (31%)    Antepartum Admit  Suspected Pyelonephritis  Ancef 2 gm  UC, CBC, CMP, repeat bladder and kidney sono, Tylenol, Blood cultures, IV fluids  Discussed with Dr. Baugh/ Dr. Barber

## 2020-06-20 NOTE — OB PROVIDER H&P - HISTORY OF PRESENT ILLNESS
Patient is a 30yo G1 at 37.3 wks presents with left flank pain since yesterday, currently 6/10 on pain scale. Denies VB/LOF/Ctx. Reports positive fetal movement GBS negative on 6/8. Patient also has 3x4 burn blister to right bottom, cause for leakage of hot water used as warm compress for back pain.  PNI: June 2:  presenting for pruritus of palms and soles, treated for suspected cholestasis started on ursodiol Bile acids 4.7., also treated for positive UTI with Keflex          April 23-28 admitted to MICU with sepsis from suspected left pyelonephritis and a non-obstructing 1cm left renal stone. Patient was treated with IV CTX and sent home with a PO course of keflex, which she completed two weeks ago. ID was consulted and patient was ruled out for  COVID with 3 negative PCRs and a normal CXR. Hospital course was complicated by an RRT on 4/26. Due to persistent tachycardia, patient had CT Angio which showed bilateral patchy opacities and TTE that was normal (EF 65%).      Pob: current pregnancy  Pgyn: denies h/o STIs  PMH: left pyelonephritis, left nephrolithiasis  PSH: none  Meds: iron, PNV, vit D  All: NKDA  Social: endorses h/o smoking socially prior to pregnancy. Denies use of alcohol or drugs during pregnancy.

## 2020-06-20 NOTE — OB PROVIDER TRIAGE NOTE - HISTORY OF PRESENT ILLNESS
Patient is a 28yo G1 at 37.3 wks presents with left flank pain  PNI: June 2:  presenting for pruritus of palms and soles, treated for suspected cholestasis started on ursodiol Bile acids 4.7., also treated for positive UTI with Keflex          April 23-28 admitted to MICU with sepsis from suspected left pyelonephritis and a non-obstructing 1cm left renal stone. Patient was treated with IV CTX and sent home with a PO course of keflex, which she completed two weeks ago. ID was consulted and patient was ruled out for  COVID with 3 negative PCRs and a normal CXR. Hospital course was complicated by an RRT on 4/26. Due to persistent tachycardia, patient had CT Angio which showed bilateral patchy opacities and TTE that was normal (EF 65%).      Pob: current pregnancy  Pgyn: denies h/o STIs  PMH: left pyelonephritis, left nephrolithiasis  PSH: none  Meds: iron, PNV, vit D  All: NKDA  Social: endorses h/o smoking socially prior to pregnancy. Denies use of alcohol or drugs during pregnancy. Patient is a 28yo G1 at 37.3 wks presents with left flank pain since yesterday, currently 6/10 on pain scale.   PNI: June 2:  presenting for pruritus of palms and soles, treated for suspected cholestasis started on ursodiol Bile acids 4.7., also treated for positive UTI with Keflex          April 23-28 admitted to MICU with sepsis from suspected left pyelonephritis and a non-obstructing 1cm left renal stone. Patient was treated with IV CTX and sent home with a PO course of keflex, which she completed two weeks ago. ID was consulted and patient was ruled out for  COVID with 3 negative PCRs and a normal CXR. Hospital course was complicated by an RRT on 4/26. Due to persistent tachycardia, patient had CT Angio which showed bilateral patchy opacities and TTE that was normal (EF 65%).      Pob: current pregnancy  Pgyn: denies h/o STIs  PMH: left pyelonephritis, left nephrolithiasis  PSH: none  Meds: iron, PNV, vit D  All: NKDA  Social: endorses h/o smoking socially prior to pregnancy. Denies use of alcohol or drugs during pregnancy. Patient is a 30yo G1 at 37.3 wks presents with left flank pain since yesterday, currently 6/10 on pain scale. Denies VB/LOF/Ctx. Reports positive fetal movement GBS negative on 6/8. Patient also has 3x4 burn blister to right bottom, cause for leakage of hot water used as warm compress for back pain.  PNI: June 2:  presenting for pruritus of palms and soles, treated for suspected cholestasis started on ursodiol Bile acids 4.7., also treated for positive UTI with Keflex          April 23-28 admitted to MICU with sepsis from suspected left pyelonephritis and a non-obstructing 1cm left renal stone. Patient was treated with IV CTX and sent home with a PO course of keflex, which she completed two weeks ago. ID was consulted and patient was ruled out for  COVID with 3 negative PCRs and a normal CXR. Hospital course was complicated by an RRT on 4/26. Due to persistent tachycardia, patient had CT Angio which showed bilateral patchy opacities and TTE that was normal (EF 65%).      Pob: current pregnancy  Pgyn: denies h/o STIs  PMH: left pyelonephritis, left nephrolithiasis  PSH: none  Meds: iron, PNV, vit D  All: NKDA  Social: endorses h/o smoking socially prior to pregnancy. Denies use of alcohol or drugs during pregnancy.

## 2020-06-20 NOTE — OB RN TRIAGE NOTE - LANGUAGE ASSISTANCE NEEDED
Dr landaverde attending/No-Patient/Caregiver offered and refused free interpretation services. No-Patient/Caregiver offered and refused free interpretation services.

## 2020-06-20 NOTE — CHART NOTE - NSCHARTNOTEFT_GEN_A_CORE
2115  Located within [facility] Patient 30 y/o   EDC 2020 @37.3 weeks gestation presents to triage with complaints of flank pain, lower back pain and feeling "hot". Pregnancy course significant for admission 2020-2020 for sepsis secondary to pyelonephritis.   Vital Signs:  Bp:114/58  T:38.4  FHR:119   Acuity Score:     Patient Transferred to 6 IN stable condition 2215:    PCAP Patient 30 y/o  EDC 2020 @37.3 weeks gestation presents to triage with complaints of flank pain and feeling "hot". Patient denies contractions, leaking of fluid and vaginal bleeding. Patient reports positive fetal movement.  Current Ap course significant for : Admitted to MICU 2020-2020 for Sepsis suspected from Left Pyelonephritis  -High Suspicion for Cholestasis of pregnancy, on Ursodiol 300mg/BID    Vital Signs:  Bp:114/58  HR:111  T:38.4 (Orally)  FHR :160'S    Patient Transferred to [facility] in stable condition    Ruiz Marte NP 2215:    PCAP Patient 30 y/o  EDC 2020 @37.3 weeks gestation presents to triage with complaints of flank pain and feeling "hot". Patient denies contractions, leaking of fluid and vaginal bleeding. Patient reports positive fetal movement.  Current Ap course significant for : Admitted to MICU 2020-2020 for Sepsis from suspected Left Pyelonephritis  -High Suspicion for Cholestasis of pregnancy, on Ursodiol 300mg/BID    Vital Signs:  Bp:114/58  HR:111  T:38.4 (Orally)  FHR :160'S    Patient Transferred to [facility] in stable condition    Ruiz Marte NP

## 2020-06-20 NOTE — OB RN TRIAGE NOTE - CHIEF COMPLAINT QUOTE
Left flank pain Left flank pain and a blister right buttock after using a hot bottle as warm compress on affected area.

## 2020-06-20 NOTE — OB PROVIDER TRIAGE NOTE - NSOBPROVIDERNOTE_OBGYN_ALL_OB_FT
A/P: Patient is a 28yo G1 at 34w6d presenting for pruritus of palms and soles and decreased fetal movement. Patient's symptoms and recently elevated LFTs raise suspicion for cholestasis of pregnancy. Patient has reassuring fetal status by sonographic criteria with a normal BPP and with subjective increase in fetal movement while in triage. As patient reports a strong odor in her urine but a normal physical exam with no CVA tenderness, will evaluate for a possible UTI. No evidence of pyelonephritis at this time.  - CBC, CMP, Hepatitis panel and bile acids sent  - UA and Urine culture sent  - Continue to monitor    D/w Clover Merida, PGY3    Update 11:30am:   Bedside BPP at approximately 9am: BPP 8/8, vertex, JOSELINE 14.6cm    Labs reviewed and notable for WBC 9.85, normal platelets (267), AST/ALT 33/72 (stable from 49/78 on 5/5). UA shows evidence of UTI with positive nitrites, large LE, bacteria and WBC. Hepatitis panel and bile acids were sent to core lab and will take 2-3 days to result.     ATU sono (6/2): vertex, EFW 5lb7oz (2457gm, 34%ile), FHR 130bpm    Findings reviewed with Dr. Solis. Patient's UA shows evidence of UTI. Will give one dose of IV CTX and prescribe keflex 500mg q6h x 7 days. Will start suppressive UTI therapy after patient finishes keflex with macrobid 100mg daily. Given high suspicion for cholestasis of pregnancy, will prescribe ursodiol 300mg BID. Patient to follow up in high risk clinic on Monday 6/8. [facility] clinic called to schedule patient for appointment.    Plan to discharge patient home with labor and decreased fetal movement precautions.    Patient seen and discussed with Dr. Irma Walters, PGY1 Patient is a 30yo G1 at 37.3 wks presents with left flank pain since yesterday, currently 6/10 on pain scale. Denies VB/LOF/Ctx. Reports positive fetal movement GBS negative on 6/8. Patient also has 3x4 burn blister to right bottom, cause for leakage of hot water used as warm compress for back pain.  PNI: June 2:  presenting for pruritus of palms and soles, treated for suspected cholestasis started on ursodiol Bile acids 4.7., also treated for positive UTI with Keflex          April 23-28 admitted to MICU with sepsis from suspected left pyelonephritis and a non-obstructing 1cm left renal stone. Patient was treated with IV CTX and sent home with a PO course of keflex, which she completed two weeks ago. ID was consulted and patient was ruled out for  COVID with 3 negative PCRs and a normal CXR. Hospital course was complicated by an RRT on 4/26. Due to persistent tachycardia, patient had CT Angio which showed bilateral patchy opacities and TTE that was normal (EF 65%).      Pob: current pregnancy  Pgyn: denies h/o STIs  PMH: left pyelonephritis, left nephrolithiasis  PSH: none  Meds: iron, PNV, vit D  All: NKDA  Social: endorses h/o smoking socially prior to pregnancy. Denies use of alcohol or drugs during pregnancy.    Vital Signs Last 24 Hrs  T(C): 38.9 (20 Jun 2020 21:43), Max: 38.9 (20 Jun 2020 21:33)  T(F): 102.02 (20 Jun 2020 21:43), Max: 102.02 (20 Jun 2020 21:43)  HR: 97 (20 Jun 2020 23:10) (93 - 119)  BP: 113/69 (20 Jun 2020 21:52) (113/69 - 113/69)  BP(mean): --  RR: 16 (20 Jun 2020 21:33) (16 - 16)  SpO2: 97% (20 Jun 2020 23:10) (97% - 100%)    PE:  - GEN: NAD  - CV: extremities well-perfused  - LUNGS: breathing comfortably  - ABD: soft, nontender, gravid  - : positive CVA tenderness on left side  - EXT: no LE edema or erythema  - skin: right bottom with 3x4 cm burn blister healing    SVE: L/C/P  EFM: 170 bpm/mod shira/+accels/-decels  TOCO: no ctx    BEDSIDE SONO: vertex, BPP 8/8, JOSELINE 15. 5 cm, Vertex presentation, Anterior placenta    ATU Sono 4/24: cephalic, JOSELINE 16.86cm, BPP 8/8, EFW 1336gm (31%)    Antepartum Admit  Suspected Pyelonephritis  Ancef 2 gm  UC, CBC, CMP, repeat bladder and kidney sono, Tylenol, Blood cultures, IV fluids  Discussed with Dr. Baugh/ Dr. Barber

## 2020-06-21 ENCOUNTER — TRANSCRIPTION ENCOUNTER (OUTPATIENT)
Age: 30
End: 2020-06-21

## 2020-06-21 DIAGNOSIS — N12 TUBULO-INTERSTITIAL NEPHRITIS, NOT SPECIFIED AS ACUTE OR CHRONIC: ICD-10-CM

## 2020-06-21 DIAGNOSIS — O23.00 INFECTIONS OF KIDNEY IN PREGNANCY, UNSPECIFIED TRIMESTER: ICD-10-CM

## 2020-06-21 LAB
ALBUMIN SERPL ELPH-MCNC: 3.1 G/DL — LOW (ref 3.3–5)
ALBUMIN SERPL ELPH-MCNC: 3.2 G/DL — LOW (ref 3.3–5)
ALP SERPL-CCNC: 242 U/L — HIGH (ref 40–120)
ALP SERPL-CCNC: 248 U/L — HIGH (ref 40–120)
ALT FLD-CCNC: 54 U/L — HIGH (ref 4–33)
ALT FLD-CCNC: 56 U/L — HIGH (ref 4–33)
ANION GAP SERPL CALC-SCNC: 14 MMO/L — SIGNIFICANT CHANGE UP (ref 7–14)
ANION GAP SERPL CALC-SCNC: 14 MMO/L — SIGNIFICANT CHANGE UP (ref 7–14)
APTT BLD: 28.4 SEC — SIGNIFICANT CHANGE UP (ref 27.5–36.3)
AST SERPL-CCNC: 38 U/L — HIGH (ref 4–32)
AST SERPL-CCNC: 40 U/L — HIGH (ref 4–32)
BASOPHILS # BLD AUTO: 0.02 K/UL — SIGNIFICANT CHANGE UP (ref 0–0.2)
BASOPHILS NFR BLD AUTO: 0.1 % — SIGNIFICANT CHANGE UP (ref 0–2)
BILIRUB SERPL-MCNC: 0.3 MG/DL — SIGNIFICANT CHANGE UP (ref 0.2–1.2)
BILIRUB SERPL-MCNC: 0.3 MG/DL — SIGNIFICANT CHANGE UP (ref 0.2–1.2)
BLD GP AB SCN SERPL QL: NEGATIVE — SIGNIFICANT CHANGE UP
BUN SERPL-MCNC: 4 MG/DL — LOW (ref 7–23)
BUN SERPL-MCNC: 6 MG/DL — LOW (ref 7–23)
CALCIUM SERPL-MCNC: 8.8 MG/DL — SIGNIFICANT CHANGE UP (ref 8.4–10.5)
CALCIUM SERPL-MCNC: 8.8 MG/DL — SIGNIFICANT CHANGE UP (ref 8.4–10.5)
CHLORIDE SERPL-SCNC: 102 MMOL/L — SIGNIFICANT CHANGE UP (ref 98–107)
CHLORIDE SERPL-SCNC: 98 MMOL/L — SIGNIFICANT CHANGE UP (ref 98–107)
CO2 SERPL-SCNC: 19 MMOL/L — LOW (ref 22–31)
CO2 SERPL-SCNC: 20 MMOL/L — LOW (ref 22–31)
CREAT SERPL-MCNC: 0.54 MG/DL — SIGNIFICANT CHANGE UP (ref 0.5–1.3)
CREAT SERPL-MCNC: 0.55 MG/DL — SIGNIFICANT CHANGE UP (ref 0.5–1.3)
EOSINOPHIL # BLD AUTO: 0.01 K/UL — SIGNIFICANT CHANGE UP (ref 0–0.5)
EOSINOPHIL NFR BLD AUTO: 0.1 % — SIGNIFICANT CHANGE UP (ref 0–6)
GLUCOSE SERPL-MCNC: 80 MG/DL — SIGNIFICANT CHANGE UP (ref 70–99)
GLUCOSE SERPL-MCNC: 89 MG/DL — SIGNIFICANT CHANGE UP (ref 70–99)
HCT VFR BLD CALC: 31.6 % — LOW (ref 34.5–45)
HCT VFR BLD CALC: 33.3 % — LOW (ref 34.5–45)
HGB BLD-MCNC: 10.7 G/DL — LOW (ref 11.5–15.5)
HGB BLD-MCNC: 10.8 G/DL — LOW (ref 11.5–15.5)
IMM GRANULOCYTES NFR BLD AUTO: 0.6 % — SIGNIFICANT CHANGE UP (ref 0–1.5)
INR BLD: 1.05 — SIGNIFICANT CHANGE UP (ref 0.88–1.17)
LACTATE SERPL-SCNC: 1.6 MMOL/L — SIGNIFICANT CHANGE UP (ref 0.5–2)
LYMPHOCYTES # BLD AUTO: 1.13 K/UL — SIGNIFICANT CHANGE UP (ref 1–3.3)
LYMPHOCYTES # BLD AUTO: 7.7 % — LOW (ref 13–44)
MCHC RBC-ENTMCNC: 29.7 PG — SIGNIFICANT CHANGE UP (ref 27–34)
MCHC RBC-ENTMCNC: 30.7 PG — SIGNIFICANT CHANGE UP (ref 27–34)
MCHC RBC-ENTMCNC: 32.4 % — SIGNIFICANT CHANGE UP (ref 32–36)
MCHC RBC-ENTMCNC: 33.9 % — SIGNIFICANT CHANGE UP (ref 32–36)
MCV RBC AUTO: 90.8 FL — SIGNIFICANT CHANGE UP (ref 80–100)
MCV RBC AUTO: 91.5 FL — SIGNIFICANT CHANGE UP (ref 80–100)
MONOCYTES # BLD AUTO: 0.96 K/UL — HIGH (ref 0–0.9)
MONOCYTES NFR BLD AUTO: 6.5 % — SIGNIFICANT CHANGE UP (ref 2–14)
NEUTROPHILS # BLD AUTO: 12.55 K/UL — HIGH (ref 1.8–7.4)
NEUTROPHILS NFR BLD AUTO: 85 % — HIGH (ref 43–77)
NRBC # FLD: 0 K/UL — SIGNIFICANT CHANGE UP (ref 0–0)
NRBC # FLD: 0 K/UL — SIGNIFICANT CHANGE UP (ref 0–0)
PLATELET # BLD AUTO: 219 K/UL — SIGNIFICANT CHANGE UP (ref 150–400)
PLATELET # BLD AUTO: 220 K/UL — SIGNIFICANT CHANGE UP (ref 150–400)
PMV BLD: 11.4 FL — SIGNIFICANT CHANGE UP (ref 7–13)
PMV BLD: 11.5 FL — SIGNIFICANT CHANGE UP (ref 7–13)
POTASSIUM SERPL-MCNC: 3.5 MMOL/L — SIGNIFICANT CHANGE UP (ref 3.5–5.3)
POTASSIUM SERPL-MCNC: 3.6 MMOL/L — SIGNIFICANT CHANGE UP (ref 3.5–5.3)
POTASSIUM SERPL-SCNC: 3.5 MMOL/L — SIGNIFICANT CHANGE UP (ref 3.5–5.3)
POTASSIUM SERPL-SCNC: 3.6 MMOL/L — SIGNIFICANT CHANGE UP (ref 3.5–5.3)
PROT SERPL-MCNC: 6.2 G/DL — SIGNIFICANT CHANGE UP (ref 6–8.3)
PROT SERPL-MCNC: 6.5 G/DL — SIGNIFICANT CHANGE UP (ref 6–8.3)
PROTHROM AB SERPL-ACNC: 12 SEC — SIGNIFICANT CHANGE UP (ref 9.8–13.1)
RBC # BLD: 3.48 M/UL — LOW (ref 3.8–5.2)
RBC # BLD: 3.64 M/UL — LOW (ref 3.8–5.2)
RBC # FLD: 13.6 % — SIGNIFICANT CHANGE UP (ref 10.3–14.5)
RBC # FLD: 13.8 % — SIGNIFICANT CHANGE UP (ref 10.3–14.5)
RH IG SCN BLD-IMP: POSITIVE — SIGNIFICANT CHANGE UP
SARS-COV-2 RNA SPEC QL NAA+PROBE: SIGNIFICANT CHANGE UP
SODIUM SERPL-SCNC: 132 MMOL/L — LOW (ref 135–145)
SODIUM SERPL-SCNC: 135 MMOL/L — SIGNIFICANT CHANGE UP (ref 135–145)
URATE SERPL-MCNC: 3.9 MG/DL — SIGNIFICANT CHANGE UP (ref 2.5–7)
WBC # BLD: 13.77 K/UL — HIGH (ref 3.8–10.5)
WBC # BLD: 14.76 K/UL — HIGH (ref 3.8–10.5)
WBC # FLD AUTO: 13.77 K/UL — HIGH (ref 3.8–10.5)
WBC # FLD AUTO: 14.76 K/UL — HIGH (ref 3.8–10.5)

## 2020-06-21 PROCEDURE — 76770 US EXAM ABDO BACK WALL COMP: CPT | Mod: 26

## 2020-06-21 PROCEDURE — 99222 1ST HOSP IP/OBS MODERATE 55: CPT

## 2020-06-21 PROCEDURE — 99232 SBSQ HOSP IP/OBS MODERATE 35: CPT | Mod: GC

## 2020-06-21 RX ORDER — ACETAMINOPHEN 500 MG
1000 TABLET ORAL ONCE
Refills: 0 | Status: COMPLETED | OUTPATIENT
Start: 2020-06-21 | End: 2020-06-21

## 2020-06-21 RX ORDER — SODIUM CHLORIDE 9 MG/ML
1000 INJECTION, SOLUTION INTRAVENOUS
Refills: 0 | Status: DISCONTINUED | OUTPATIENT
Start: 2020-06-21 | End: 2020-06-21

## 2020-06-21 RX ORDER — MEROPENEM 1 G/30ML
1000 INJECTION INTRAVENOUS ONCE
Refills: 0 | Status: COMPLETED | OUTPATIENT
Start: 2020-06-21 | End: 2020-06-21

## 2020-06-21 RX ORDER — MEROPENEM 1 G/30ML
1000 INJECTION INTRAVENOUS EVERY 8 HOURS
Refills: 0 | Status: DISCONTINUED | OUTPATIENT
Start: 2020-06-21 | End: 2020-06-22

## 2020-06-21 RX ORDER — MEROPENEM 1 G/30ML
INJECTION INTRAVENOUS
Refills: 0 | Status: DISCONTINUED | OUTPATIENT
Start: 2020-06-21 | End: 2020-06-22

## 2020-06-21 RX ORDER — SODIUM CHLORIDE 9 MG/ML
500 INJECTION, SOLUTION INTRAVENOUS ONCE
Refills: 0 | Status: DISCONTINUED | OUTPATIENT
Start: 2020-06-21 | End: 2020-06-21

## 2020-06-21 RX ORDER — HEPARIN SODIUM 5000 [USP'U]/ML
5000 INJECTION INTRAVENOUS; SUBCUTANEOUS EVERY 12 HOURS
Refills: 0 | Status: DISCONTINUED | OUTPATIENT
Start: 2020-06-21 | End: 2020-06-22

## 2020-06-21 RX ORDER — CEFTRIAXONE 500 MG/1
1000 INJECTION, POWDER, FOR SOLUTION INTRAMUSCULAR; INTRAVENOUS EVERY 24 HOURS
Refills: 0 | Status: DISCONTINUED | OUTPATIENT
Start: 2020-06-21 | End: 2020-06-21

## 2020-06-21 RX ORDER — SODIUM CHLORIDE 9 MG/ML
1000 INJECTION, SOLUTION INTRAVENOUS ONCE
Refills: 0 | Status: COMPLETED | OUTPATIENT
Start: 2020-06-21 | End: 2020-06-21

## 2020-06-21 RX ORDER — ACETAMINOPHEN 500 MG
650 TABLET ORAL EVERY 6 HOURS
Refills: 0 | Status: DISCONTINUED | OUTPATIENT
Start: 2020-06-21 | End: 2020-06-22

## 2020-06-21 RX ADMIN — SODIUM CHLORIDE 1000 MILLILITER(S): 9 INJECTION, SOLUTION INTRAVENOUS at 14:05

## 2020-06-21 RX ADMIN — SODIUM CHLORIDE 250 MILLILITER(S): 9 INJECTION, SOLUTION INTRAVENOUS at 00:23

## 2020-06-21 RX ADMIN — SODIUM CHLORIDE 16.67 MILLILITER(S): 9 INJECTION, SOLUTION INTRAVENOUS at 06:02

## 2020-06-21 RX ADMIN — MEROPENEM 100 MILLIGRAM(S): 1 INJECTION INTRAVENOUS at 18:48

## 2020-06-21 RX ADMIN — Medication 975 MILLIGRAM(S): at 14:07

## 2020-06-21 RX ADMIN — HEPARIN SODIUM 5000 UNIT(S): 5000 INJECTION INTRAVENOUS; SUBCUTANEOUS at 18:50

## 2020-06-21 RX ADMIN — Medication 400 MILLIGRAM(S): at 06:02

## 2020-06-21 RX ADMIN — SODIUM CHLORIDE 250 MILLILITER(S): 9 INJECTION, SOLUTION INTRAVENOUS at 20:00

## 2020-06-21 RX ADMIN — CEFTRIAXONE 100 MILLIGRAM(S): 500 INJECTION, POWDER, FOR SOLUTION INTRAMUSCULAR; INTRAVENOUS at 00:23

## 2020-06-21 NOTE — CHART NOTE - NSCHARTNOTEFT_GEN_A_CORE
Attending Note     Came to room to evaluate pt for temp 103.1   FHTs 200 mod shira no decels  + accels     Pt is currently admitted for pyelonephritis currently on ceftriaxone 6/21 at 12 :30 AM   /80  temp 103.1  Gen cold packs on   Abd soft, fundus not tender   Ext: no c/c/e  Back declines exam     FHTs 190 mod shira no decels + accels   TOCO acontractile     MICU consult called   ID consult called   IV bolus   repeat labs    case discussed with MFM Dr. Irma Hernandez Attending Note     Came to room to evaluate pt for temp 103.1   FHTs 200 mod shira no decels  + accels     Pt is currently admitted for pyelonephritis currently on ceftriaxone 6/21 at 12 :30 AM   /80  temp 103.1  Gen cold packs on   Abd soft, fundus not tender   Ext: no c/c/e  Back declines exam     FHTs 190 mod shira no decels + accels   TOCO acontractile     MICU consult called   ID consult called   IV bolus   repeat labs now with lactate  cooling blanket     case discussed with MFM Dr. Irma Hernandez

## 2020-06-21 NOTE — CHART NOTE - NSCHARTNOTEFT_GEN_A_CORE
R3 Labor  note    Patient states that she feels well and no longer feels feverish or chills, last fever this morning. She denies abdominal pain, n/v.    Vital Signs Last 24 Hrs  T(C): 38.7 (21 Jun 2020 05:40), Max: 38.9 (20 Jun 2020 21:33)  T(F): 101.66 (21 Jun 2020 05:40), Max: 102.02 (20 Jun 2020 21:43)  HR: 102 (21 Jun 2020 06:49) (61 - 122)  BP: 105/59 (21 Jun 2020 04:19) (105/59 - 123/59)  RR: 16 (21 Jun 2020 00:25) (16 - 16)  SpO2: 98% (21 Jun 2020 06:49) (93% - 100%)    well appearing, NAD  Heart: tachycardic  Lungs: CTAB, patient with good respiratory effort  FETAL HEART RATE: 160 (previously 180), mod shira, +accels, no decels  [place]: no ctx    PAIN SCALE (0-10): 2/10    IMPRESSION: Improving tachycardia, Cat 2,     INTERVENTIONS: C/w current management, judicious use of fluids 2/2 risk of ARDS in pyelonephritis.     D/w Dr. David Montilla PGY-3

## 2020-06-21 NOTE — PROGRESS NOTE ADULT - SUBJECTIVE AND OBJECTIVE BOX
R3 Antepartum Note, HD#2     #[id number]    Interval events: patient admitted overnight with maternal and fetal tachycardia, febrile. Tachycardia resolved with IVF, antipyretics, and antibiotics.     Patient seen and examined at bedside, no acute overnight events. No acute complaints. Pt reports +FM, denies LOF, VB, ctx, HA, epigastric pain, blurred vision, CP, SOB, N/V, fevers, and chills.    Vital Signs Last 24 Hours  T(C): 36.7 (06-21-20 @ 02:20), Max: 38.9 (06-20-20 @ 21:33)  HR: 70 (06-21-20 @ 02:59) (61 - 119)  BP: 123/59 (06-21-20 @ 02:02) (108/55 - 123/59)  RR: 16 (06-21-20 @ 00:25) (16 - 16)  SpO2: 99% (06-21-20 @ 02:59) (93% - 100%)    CAPILLARY BLOOD GLUCOSE          Physical Exam:  General: NAD  Back: No CVAT  Abdomen: Soft, non-tender, gravid  Ext: No pain or swelling  SVE: 0/0/-3  EFM: 110/mod variability/+accels/-decels  TOCO: periods of ctx q3-4m    Labs:             11.2   11.72 )-----------( 237      ( 06-20 @ 22:00 )             34.5     06-20 @ 22:00    133  |  99  |  8   ----------------------------<  114  3.8   |  18  |  0.63    Ca    8.9      06-20 @ 22:00    TPro  6.9  /  Alb  3.2  /  TBili  0.3  /  DBili  x   /  AST  49  /  ALT  64  /  AlkPhos  272  06-20 @ 22:00        Uric Acid: (06-20 @ 23:15)  3.9      Fibrinogen: (06-20 @ 23:15)  --       LDH: (06-20 @ 23:15)  --         MEDICATIONS  (STANDING):  cefTRIAXone   IVPB 1000 milliGRAM(s) IV Intermittent every 24 hours  lactated ringers Bolus 500 milliLiter(s) IV Bolus once  lactated ringers Bolus 500 milliLiter(s) IV Bolus once  lactated ringers. 1000 milliLiter(s) (250 mL/Hr) IV Continuous <Continuous>    MEDICATIONS  (PRN):  acetaminophen   Tablet .. 975 milliGRAM(s) Oral every 6 hours PRN Temp greater or equal to 38C (100.4F), Mild Pain (1 - 3), Moderate Pain (4 - 6)  oxyCODONE    IR 5 milliGRAM(s) Oral every 6 hours PRN Severe Pain (7 - 10)

## 2020-06-21 NOTE — OB RN PATIENT PROFILE - RESPIRATORY RATE (BREATHS/MIN)
Post-Op Assessment Note    CV Status:  Stable    Pain management: adequate     Mental Status:  Sleepy   Hydration Status:  Euvolemic   PONV Controlled:  Controlled   Airway Patency:  Patent and adequate   Post Op Vitals Reviewed: Yes      Staff: CRNA           BP   97/52   Temp      Pulse  101   Resp   13   SpO2   98% 16

## 2020-06-21 NOTE — CONSULT NOTE ADULT - PROBLEM SELECTOR RECOMMENDATION 9
on ceftriaxone, may require broadening to meropenum (worked in the past). cover insensible fluid loses, IV or PO. May require standing acetaminophen for comfort, and as her tachycardia from fever causes fetal tachycardia it may help both mother and fetus. Otherwise, at this time she does not require MICU level of intervention, therapy, or treatment.

## 2020-06-21 NOTE — PROGRESS NOTE ADULT - SUBJECTIVE AND OBJECTIVE BOX
Patient is a 29y old  Female who presents with a chief complaint of pyelonephritis (2020 03:04)      HPI:  Patient is a 30yo G1 at 37.3 wks presents with left flank pain since yesterday, currently 6/10 on pain scale. Denies VB/LOF/Ctx. Reports positive fetal movement GBS negative on . Patient also has 3x4 burn blister to right bottom, cause for leakage of hot water used as warm compress for back pain.  PNI: :  presenting for pruritus of palms and soles, treated for suspected cholestasis started on ursodiol Bile acids 4.7., also treated for positive UTI with Keflex          - admitted to MICU with sepsis from suspected left pyelonephritis and a non-obstructing 1cm left renal stone. Patient was treated with IV CTX and sent home with a PO course of keflex, which she completed two weeks ago. ID was consulted and patient was ruled out for  COVID with 3 negative PCRs and a normal CXR. Hospital course was complicated by an RRT on . Due to persistent tachycardia, patient had CT Angio which showed bilateral patchy opacities and TTE that was normal (EF 65%).      Pob: current pregnancy  Pgyn: denies h/o STIs  PMH: left pyelonephritis, left nephrolithiasis  PSH: none  Meds: iron, PNV, vit D  All: NKDA  Social: endorses h/o smoking socially prior to pregnancy. Denies use of alcohol or drugs during pregnancy. (2020 23:22)      PAST MEDICAL & SURGICAL HISTORY:  Pyelonephritis affecting pregnancy: admit 2020 rx with cephtriaxone  No significant past surgical history      MEDICATIONS  (STANDING):  cefTRIAXone   IVPB 1000 milliGRAM(s) IV Intermittent every 24 hours  lactated ringers Bolus 500 milliLiter(s) IV Bolus once  lactated ringers Bolus 500 milliLiter(s) IV Bolus once  lactated ringers. 1000 milliLiter(s) (250 mL/Hr) IV Continuous <Continuous>              Vital Signs Last 24 Hrs  T(C): 37.4 (2020 06:53), Max: 38.9 (2020 21:33)  T(F): 99.32 (2020 06:53), Max: 102.02 (2020 21:43)  HR: 79 (2020 08:39) (61 - 122)  BP: 106/57 (2020 07:06) (105/59 - 123/59)  BP(mean): --  RR: 16 (2020 00:25) (16 - 16)  SpO2: 96% (2020 08:44) (93% - 100%)    PHYSICAL EXAM:  ON exam no fever at this time  FHR tracing with mild tachycardia with no deceleration and moderate variability  Abdomen soft and non tender  Will continue IV antibiotics and tylenol for fever.  Will continue conservative management at present.      I&O's Summary      LABORATORY:                        10.7   14.76 )-----------( 220      ( 2020 07:35 )             31.6     06-21    135  |  102  |  6<L>  ----------------------------<  89  3.5   |  19<L>  |  0.54    Ca    8.8      2020 07:35    TPro  6.2  /  Alb  3.1<L>  /  TBili  0.3  /  DBili  x   /  AST  38<H>  /  ALT  54<H>  /  AlkPhos  242<H>  06-21      Urinalysis Basic - ( 2020 22:00 )    Color: LIGHT YELLOW / Appearance: Lt TURBID / S.009 / pH: 7.5  Gluc: NEGATIVE / Ketone: NEGATIVE  / Bili: NEGATIVE / Urobili: NORMAL   Blood: NEGATIVE / Protein: 10 / Nitrite: NEGATIVE   Leuk Esterase: LARGE / RBC: 0-2 / WBC 11-25   Sq Epi: OCC / Non Sq Epi: x / Bacteria: MANY

## 2020-06-21 NOTE — CHART NOTE - NSCHARTNOTEFT_GEN_A_CORE
Patient seen at 2:05pm    Called for fever of 103.1 and tachycardia to 110s.    Patient states that she had fever and chills. Complains of some generalized lower back pain. Denies CP, SOB, n/v.    Appears diaphoretic  Heart: tachycardic  Lungs: CTAB  Abd: soft, NT, gravid, FM palpable  LE: mild bl symmetric swelling    FHR 200s, min-mod variability, no accels, no decels  no ctx    Tylenol provided. Patient's dose of Ceftriaxone at 12:30am. Patient has only grown E. Coli in the past sensitive to ceftriaxone, but has been on Meropenem before. ID and ICU consults. Dr. Irma roy and Dr. Braun-Ramon at bedside.    -CBC, CMP, lactate drawn  -blood culturesx2  -cooling blanket ordered  -LR fluid bolus given    P. Uppalapati PGY-3

## 2020-06-21 NOTE — CONSULT NOTE ADULT - SUBJECTIVE AND OBJECTIVE BOX
Donna is a 28yo G1 at 37.3 wks with recurrent UTIs, who's previously been admitted to the MICU for plyeonephritis and sepsis. Now she has a clinical plyeo, with E.Coli UTI, causing sepsis. On my limited interview, while she speaks Mauritian primarily, there were no acute concerns. In speaking with the RN and Resident, there are no concerns with fetal tracings.         Her exam is notable only for being gravid. otherwise no tachypnea, RR < 20, no petechiae, trace dependent pitting edema, no jaundice or scleral icterus, regular tachycardia.

## 2020-06-22 ENCOUNTER — RESULT REVIEW (OUTPATIENT)
Age: 30
End: 2020-06-22

## 2020-06-22 ENCOUNTER — APPOINTMENT (OUTPATIENT)
Dept: ANTEPARTUM | Facility: HOSPITAL | Age: 30
End: 2020-06-22

## 2020-06-22 ENCOUNTER — APPOINTMENT (OUTPATIENT)
Dept: OBGYN | Facility: HOSPITAL | Age: 30
End: 2020-06-22

## 2020-06-22 ENCOUNTER — ASOB RESULT (OUTPATIENT)
Age: 30
End: 2020-06-22

## 2020-06-22 LAB
ALBUMIN SERPL ELPH-MCNC: 3.1 G/DL — LOW (ref 3.3–5)
ALP SERPL-CCNC: 229 U/L — HIGH (ref 40–120)
ALT FLD-CCNC: 48 U/L — HIGH (ref 4–33)
ANION GAP SERPL CALC-SCNC: 18 MMO/L — HIGH (ref 7–14)
AST SERPL-CCNC: 34 U/L — HIGH (ref 4–32)
BASOPHILS # BLD AUTO: 0.03 K/UL — SIGNIFICANT CHANGE UP (ref 0–0.2)
BASOPHILS NFR BLD AUTO: 0.2 % — SIGNIFICANT CHANGE UP (ref 0–2)
BILIRUB SERPL-MCNC: 0.3 MG/DL — SIGNIFICANT CHANGE UP (ref 0.2–1.2)
BUN SERPL-MCNC: 5 MG/DL — LOW (ref 7–23)
CALCIUM SERPL-MCNC: 8.8 MG/DL — SIGNIFICANT CHANGE UP (ref 8.4–10.5)
CHLORIDE SERPL-SCNC: 102 MMOL/L — SIGNIFICANT CHANGE UP (ref 98–107)
CO2 SERPL-SCNC: 13 MMOL/L — LOW (ref 22–31)
CREAT SERPL-MCNC: 0.52 MG/DL — SIGNIFICANT CHANGE UP (ref 0.5–1.3)
CULTURE RESULTS: NO GROWTH — SIGNIFICANT CHANGE UP
EOSINOPHIL # BLD AUTO: 0.01 K/UL — SIGNIFICANT CHANGE UP (ref 0–0.5)
EOSINOPHIL NFR BLD AUTO: 0.1 % — SIGNIFICANT CHANGE UP (ref 0–6)
GLUCOSE SERPL-MCNC: 61 MG/DL — LOW (ref 70–99)
HCT VFR BLD CALC: 32.1 % — LOW (ref 34.5–45)
HGB BLD-MCNC: 10.4 G/DL — LOW (ref 11.5–15.5)
IMM GRANULOCYTES NFR BLD AUTO: 0.8 % — SIGNIFICANT CHANGE UP (ref 0–1.5)
LYMPHOCYTES # BLD AUTO: 1.54 K/UL — SIGNIFICANT CHANGE UP (ref 1–3.3)
LYMPHOCYTES # BLD AUTO: 11.8 % — LOW (ref 13–44)
MCHC RBC-ENTMCNC: 30.5 PG — SIGNIFICANT CHANGE UP (ref 27–34)
MCHC RBC-ENTMCNC: 32.4 % — SIGNIFICANT CHANGE UP (ref 32–36)
MCV RBC AUTO: 94.1 FL — SIGNIFICANT CHANGE UP (ref 80–100)
MONOCYTES # BLD AUTO: 0.59 K/UL — SIGNIFICANT CHANGE UP (ref 0–0.9)
MONOCYTES NFR BLD AUTO: 4.5 % — SIGNIFICANT CHANGE UP (ref 2–14)
NEUTROPHILS # BLD AUTO: 10.82 K/UL — HIGH (ref 1.8–7.4)
NEUTROPHILS NFR BLD AUTO: 82.6 % — HIGH (ref 43–77)
NRBC # FLD: 0 K/UL — SIGNIFICANT CHANGE UP (ref 0–0)
PLATELET # BLD AUTO: 222 K/UL — SIGNIFICANT CHANGE UP (ref 150–400)
PMV BLD: 11.6 FL — SIGNIFICANT CHANGE UP (ref 7–13)
POTASSIUM SERPL-MCNC: 3.6 MMOL/L — SIGNIFICANT CHANGE UP (ref 3.5–5.3)
POTASSIUM SERPL-SCNC: 3.6 MMOL/L — SIGNIFICANT CHANGE UP (ref 3.5–5.3)
PROT SERPL-MCNC: 6.3 G/DL — SIGNIFICANT CHANGE UP (ref 6–8.3)
RBC # BLD: 3.41 M/UL — LOW (ref 3.8–5.2)
RBC # FLD: 13.8 % — SIGNIFICANT CHANGE UP (ref 10.3–14.5)
SODIUM SERPL-SCNC: 133 MMOL/L — LOW (ref 135–145)
SPECIMEN SOURCE: SIGNIFICANT CHANGE UP
T PALLIDUM AB TITR SER: NEGATIVE — SIGNIFICANT CHANGE UP
WBC # BLD: 13.09 K/UL — HIGH (ref 3.8–10.5)
WBC # FLD AUTO: 13.09 K/UL — HIGH (ref 3.8–10.5)

## 2020-06-22 PROCEDURE — 59514 CESAREAN DELIVERY ONLY: CPT | Mod: U9,UB,GC

## 2020-06-22 PROCEDURE — 99233 SBSQ HOSP IP/OBS HIGH 50: CPT

## 2020-06-22 PROCEDURE — 88307 TISSUE EXAM BY PATHOLOGIST: CPT | Mod: 26

## 2020-06-22 PROCEDURE — 99232 SBSQ HOSP IP/OBS MODERATE 35: CPT | Mod: GC

## 2020-06-22 RX ORDER — OXYTOCIN 10 UNIT/ML
333.33 VIAL (ML) INJECTION
Qty: 20 | Refills: 0 | Status: DISCONTINUED | OUTPATIENT
Start: 2020-06-22 | End: 2020-06-25

## 2020-06-22 RX ORDER — KETOROLAC TROMETHAMINE 30 MG/ML
30 SYRINGE (ML) INJECTION EVERY 6 HOURS
Refills: 0 | Status: DISCONTINUED | OUTPATIENT
Start: 2020-06-22 | End: 2020-06-24

## 2020-06-22 RX ORDER — MAGNESIUM HYDROXIDE 400 MG/1
30 TABLET, CHEWABLE ORAL
Refills: 0 | Status: DISCONTINUED | OUTPATIENT
Start: 2020-06-22 | End: 2020-06-25

## 2020-06-22 RX ORDER — OXYCODONE HYDROCHLORIDE 5 MG/1
5 TABLET ORAL ONCE
Refills: 0 | Status: DISCONTINUED | OUTPATIENT
Start: 2020-06-22 | End: 2020-06-25

## 2020-06-22 RX ORDER — ACETAMINOPHEN 500 MG
975 TABLET ORAL
Refills: 0 | Status: DISCONTINUED | OUTPATIENT
Start: 2020-06-22 | End: 2020-06-25

## 2020-06-22 RX ORDER — DIPHENHYDRAMINE HCL 50 MG
25 CAPSULE ORAL EVERY 6 HOURS
Refills: 0 | Status: DISCONTINUED | OUTPATIENT
Start: 2020-06-22 | End: 2020-06-25

## 2020-06-22 RX ORDER — HYDROMORPHONE HYDROCHLORIDE 2 MG/ML
30 INJECTION INTRAMUSCULAR; INTRAVENOUS; SUBCUTANEOUS
Refills: 0 | Status: DISCONTINUED | OUTPATIENT
Start: 2020-06-22 | End: 2020-06-23

## 2020-06-22 RX ORDER — HYDROMORPHONE HYDROCHLORIDE 2 MG/ML
0.5 INJECTION INTRAMUSCULAR; INTRAVENOUS; SUBCUTANEOUS
Refills: 0 | Status: DISCONTINUED | OUTPATIENT
Start: 2020-06-22 | End: 2020-06-23

## 2020-06-22 RX ORDER — SODIUM CHLORIDE 9 MG/ML
1000 INJECTION, SOLUTION INTRAVENOUS
Refills: 0 | Status: DISCONTINUED | OUTPATIENT
Start: 2020-06-22 | End: 2020-06-22

## 2020-06-22 RX ORDER — SIMETHICONE 80 MG/1
80 TABLET, CHEWABLE ORAL EVERY 4 HOURS
Refills: 0 | Status: DISCONTINUED | OUTPATIENT
Start: 2020-06-22 | End: 2020-06-25

## 2020-06-22 RX ORDER — MEROPENEM 1 G/30ML
1000 INJECTION INTRAVENOUS EVERY 8 HOURS
Refills: 0 | Status: DISCONTINUED | OUTPATIENT
Start: 2020-06-22 | End: 2020-06-23

## 2020-06-22 RX ORDER — IBUPROFEN 200 MG
600 TABLET ORAL EVERY 6 HOURS
Refills: 0 | Status: COMPLETED | OUTPATIENT
Start: 2020-06-22 | End: 2021-05-21

## 2020-06-22 RX ORDER — CITRIC ACID/SODIUM CITRATE 300-500 MG
30 SOLUTION, ORAL ORAL ONCE
Refills: 0 | Status: DISCONTINUED | OUTPATIENT
Start: 2020-06-22 | End: 2020-06-22

## 2020-06-22 RX ORDER — LANOLIN
1 OINTMENT (GRAM) TOPICAL EVERY 6 HOURS
Refills: 0 | Status: DISCONTINUED | OUTPATIENT
Start: 2020-06-22 | End: 2020-06-25

## 2020-06-22 RX ORDER — METOCLOPRAMIDE HCL 10 MG
10 TABLET ORAL ONCE
Refills: 0 | Status: DISCONTINUED | OUTPATIENT
Start: 2020-06-22 | End: 2020-06-22

## 2020-06-22 RX ORDER — HEPARIN SODIUM 5000 [USP'U]/ML
5000 INJECTION INTRAVENOUS; SUBCUTANEOUS EVERY 12 HOURS
Refills: 0 | Status: DISCONTINUED | OUTPATIENT
Start: 2020-06-22 | End: 2020-06-25

## 2020-06-22 RX ORDER — SODIUM CHLORIDE 9 MG/ML
1000 INJECTION, SOLUTION INTRAVENOUS ONCE
Refills: 0 | Status: DISCONTINUED | OUTPATIENT
Start: 2020-06-22 | End: 2020-06-22

## 2020-06-22 RX ORDER — OXYTOCIN 10 UNIT/ML
333.33 VIAL (ML) INJECTION
Qty: 20 | Refills: 0 | Status: DISCONTINUED | OUTPATIENT
Start: 2020-06-22 | End: 2020-06-22

## 2020-06-22 RX ORDER — FAMOTIDINE 10 MG/ML
20 INJECTION INTRAVENOUS ONCE
Refills: 0 | Status: DISCONTINUED | OUTPATIENT
Start: 2020-06-22 | End: 2020-06-22

## 2020-06-22 RX ORDER — NALOXONE HYDROCHLORIDE 4 MG/.1ML
0.1 SPRAY NASAL
Refills: 0 | Status: DISCONTINUED | OUTPATIENT
Start: 2020-06-22 | End: 2020-06-25

## 2020-06-22 RX ORDER — TETANUS TOXOID, REDUCED DIPHTHERIA TOXOID AND ACELLULAR PERTUSSIS VACCINE, ADSORBED 5; 2.5; 8; 8; 2.5 [IU]/.5ML; [IU]/.5ML; UG/.5ML; UG/.5ML; UG/.5ML
0.5 SUSPENSION INTRAMUSCULAR ONCE
Refills: 0 | Status: DISCONTINUED | OUTPATIENT
Start: 2020-06-22 | End: 2020-06-25

## 2020-06-22 RX ORDER — ONDANSETRON 8 MG/1
4 TABLET, FILM COATED ORAL EVERY 6 HOURS
Refills: 0 | Status: DISCONTINUED | OUTPATIENT
Start: 2020-06-22 | End: 2020-06-25

## 2020-06-22 RX ORDER — CEFAZOLIN SODIUM 1 G
2000 VIAL (EA) INJECTION ONCE
Refills: 0 | Status: DISCONTINUED | OUTPATIENT
Start: 2020-06-22 | End: 2020-06-22

## 2020-06-22 RX ORDER — OXYCODONE HYDROCHLORIDE 5 MG/1
5 TABLET ORAL
Refills: 0 | Status: DISCONTINUED | OUTPATIENT
Start: 2020-06-22 | End: 2020-06-25

## 2020-06-22 RX ADMIN — FAMOTIDINE 20 MILLIGRAM(S): 10 INJECTION INTRAVENOUS at 10:12

## 2020-06-22 RX ADMIN — MEROPENEM 100 MILLIGRAM(S): 1 INJECTION INTRAVENOUS at 13:57

## 2020-06-22 RX ADMIN — MEROPENEM 100 MILLIGRAM(S): 1 INJECTION INTRAVENOUS at 22:02

## 2020-06-22 RX ADMIN — HYDROMORPHONE HYDROCHLORIDE 30 MILLILITER(S): 2 INJECTION INTRAMUSCULAR; INTRAVENOUS; SUBCUTANEOUS at 12:34

## 2020-06-22 RX ADMIN — HEPARIN SODIUM 5000 UNIT(S): 5000 INJECTION INTRAVENOUS; SUBCUTANEOUS at 06:11

## 2020-06-22 RX ADMIN — MEROPENEM 100 MILLIGRAM(S): 1 INJECTION INTRAVENOUS at 02:10

## 2020-06-22 RX ADMIN — Medication 10 MILLIGRAM(S): at 10:12

## 2020-06-22 RX ADMIN — HYDROMORPHONE HYDROCHLORIDE 30 MILLILITER(S): 2 INJECTION INTRAMUSCULAR; INTRAVENOUS; SUBCUTANEOUS at 15:11

## 2020-06-22 RX ADMIN — Medication 975 MILLIGRAM(S): at 17:24

## 2020-06-22 RX ADMIN — Medication 30 MILLILITER(S): at 10:12

## 2020-06-22 RX ADMIN — HEPARIN SODIUM 5000 UNIT(S): 5000 INJECTION INTRAVENOUS; SUBCUTANEOUS at 18:31

## 2020-06-22 RX ADMIN — HYDROMORPHONE HYDROCHLORIDE 30 MILLILITER(S): 2 INJECTION INTRAMUSCULAR; INTRAVENOUS; SUBCUTANEOUS at 19:13

## 2020-06-22 RX ADMIN — Medication 650 MILLIGRAM(S): at 06:12

## 2020-06-22 RX ADMIN — Medication 30 MILLIGRAM(S): at 17:25

## 2020-06-22 NOTE — PROGRESS NOTE ADULT - SUBJECTIVE AND OBJECTIVE BOX
R3 Antepartum Progress note    Patient seen and examined at bedside. She has not been febrile since yesterday afternoon but still had mild chills occasionally. Pain well controlled. Patient is ambulating, passing flatus, voiding spontaneously, and tolerating regular diet. Denies CP, SOB, N/V, fevers.    Vital Signs Last 24 Hours  T(C): 37.4 (06-22-20 @ 06:07), Max: 39.5 (06-21-20 @ 13:53)  HR: 102 (06-22-20 @ 06:07) (73 - 116)  BP: 113/62 (06-22-20 @ 06:07) (94/53 - 141/76)  RR: 18 (06-22-20 @ 06:07) (12 - 18)  SpO2: 97% (06-22-20 @ 06:07) (91% - 100%)    I&O's Detail    21 Jun 2020 07:01  -  22 Jun 2020 07:00  --------------------------------------------------------  IN:    Lactated Ringers IV Bolus: 500 mL    lactated ringers.: 250 mL  Total IN: 750 mL    OUT:    Voided: 1100 mL  Total OUT: 1100 mL    Total NET: -350 mL    Physical Exam:  General: NAD  CV: NR, RR, S1, S2, no M/R/G  Lungs: CTA-B  Abdomen: Soft, non-tender, non-distended, +BS, gravid  No CVA tenderness at this time  Ext: No pain or swelling    Labs:             10.4<L>  13.09<H> )-----------( 222      ( 06-22 @ 06:20 )             32.1<L>               10.8<L>  13.77<H> )-----------( 219      ( 06-21 @ 14:25 )             33.3<L>               10.7<L>  14.76<H> )-----------( 220      ( 06-21 @ 07:35 )             31.6<L>               11.2<L>  11.72<H> )-----------( 237      ( 06-20 @ 22:00 )             34.5     MEDICATIONS  (STANDING):  acetaminophen   Tablet .. 650 milliGRAM(s) Oral every 6 hours  heparin   Injectable 5000 Unit(s) SubCutaneous every 12 hours  lactated ringers. 1000 milliLiter(s) (250 mL/Hr) IV Continuous <Continuous>  meropenem  IVPB 1000 milliGRAM(s) IV Intermittent every 8 hours  meropenem  IVPB        MEDICATIONS  (PRN):  oxyCODONE    IR 5 milliGRAM(s) Oral every 6 hours PRN Severe Pain (7 - 10)

## 2020-06-22 NOTE — BRIEF OPERATIVE NOTE - OPERATION/FINDINGS
Liveborn male infant Apgars 7,9  Grossly normal uterus, bl tubes, ovaries  Fibrillar over hysterotomy

## 2020-06-22 NOTE — CHART NOTE - NSCHARTNOTEFT_GEN_A_CORE
Back note from 0945 due to patient care:    Called to bedside by RN due to discontinuous fetal heart tracing with audible decelerations. Patient seen at bedside, denies pain, leaking fluid, contractions. Endorses +FM. Sono brought to bedside, FHR noted to be about 90s bpm visually. Residents and Service attending called to bedside and Code OB called; OR opened and patient transferred to L&D. Chief Resident Torrey at bedside performing VE on patient, Upon transfer to L&D FHR noted to be in 140s bpm then decel noted to 100s for 4 minutes despite resuscitative measures, patient brought to OR for  delivery.     PE:  Vital Signs Last 24 Hrs  T(C): 37.4 (2020 06:07), Max: 39.5 (2020 13:53)  T(F): 99.4 (2020 06:07), Max: 103.1 (2020 13:53)  HR: 106 (2020 09:54) (64 - 116)  BP: 113/62 (2020 06:07) (94/53 - 141/76)  BP(mean): --  RR: 18 (2020 06:07) (12 - 18)  SpO2: 100% (2020 09:54) (90% - 100%)  EFM: discontinuous moderate variability with prolonged decelerations  [place]: none  VE: performed by MD Hirsch closed    Plan:  - delivery by  section  - perop orders placed    d/w Dr. Akhtar service attending and Dr. Irma IRAHETA who were at bedside   [facility]

## 2020-06-22 NOTE — OB NEONATOLOGY/PEDIATRICIAN DELIVERY SUMMARY - NSPEDSNEONOTESA_OBGYN_ALL_OB_FT
37.5 weeks gestation infant delivered to a 29 yr old  ( EDC ) A+ mother with pregnancy complicated by pyelonephritis in 2020 and mother was admitted to the MICU. Mother presented again with left flank pain and fever of 103.1 on 2020 and admitted for management of pyelonephritis. Blood and urine culture sent and mother started on ceftriaxone. Switched to meropenem after she spiked fever. Prenatal labs : GBS neg ( ), neg/neg/neg/immune. COVID neg ( ). No labor or ROM.  code 100 called for an emergency  due to fetal deceleration and mother was put under general anesthesia. Infant delivered cephalic vacuum assisted after one pop off and appeared blue with decreased respiratory effort. Dried, stimulated and suctioned. PPV for ~ 15-20 sec for HR < 100 resulting in improved HR to >100. CPAP 5 21% given for intermittent apnea and poor sats few min resulting in improved sats and apnea. EOS 2.15. Mother plans to breast and bottle feed and would like her infant to receive Hep B vaccine. Infant transported to the NICU for high EOS.

## 2020-06-22 NOTE — OB RN DELIVERY SUMMARY - NS_ADMITROM_OBGYN_ALL_OB
Palliative and Supportive Care   Evelina Gray 55 y o  female [id number]    Assessment/Plan:  1  Bilateral malignant neoplasm of breast in female, estrogen receptor positive, unspecified site of breast ([address] )    2  Cancer related pain    3  Dry skin    4  Other fatigue    5  Dizziness    6  Counseling and coordination of care      Requested Prescriptions     Signed Prescriptions Disp Refills    ammonium lactate (LAC-HYDRIN) 12 % cream 385 g 0     Sig: Apply topically as needed for dry skin    dexamethasone (DECADRON) 2 mg tablet - wean due insomnia and ab wt gain  7 tablet 0     Si/2 tab PO daily in the morning with breakfast for 2 weeks then stop    gabapentin (NEURONTIN) 300 mg capsule  0     Si capsules PO at bedtime    HYDROmorphone (DILAUDID) 8 MG tablet 75 tablet 0     Sig: Take 1 tablet (8 mg total) by mouth every 8 (eight) hours as needed for moderate pain Max Daily Amount: 24 mg     Medications Discontinued During This Encounter   Medication Reason    nortriptyline (PAMELOR) 25 mg capsule Dizziness     Representatives have queried the patient's controlled substance dispensing history in the Prescription Drug Monitoring Program in compliance with the [facility] regulations before I have prescribed any controlled substances  The prescription history is consistent with prescribed therapy and our practice policies  25 minutes were spent face to face with Evelina Gray and her daughter with greater than 50% of the time spent in counseling or coordination of care including discussions of risks and benefits of treatment, treatment instructions and follow up requirements   All of the patient's questions were answered during this discussion  Return in about 1 month (around 2018)  Subjective:   Chief Complaint  Follow up visit for:  symptom management  HPI     Evelina Gray is a 55 y o  female with metastatic breast cancer  She presents today with her daughter    Her insurance gap has now been resolved and she has resumed lymphedema therapy  She needs the script for dry skin resent to pharmacy  Her pain is stable but she remains dizzy and continues to struggle with lack of sleep  She is also bothered by the size of her abdomen  The following portions of the medical history were reviewed: past medical history, problem list, medication list, and social history  Current Outpatient Prescriptions:     dexamethasone (DECADRON) 2 mg tablet, 1/2 tab PO daily in the morning with breakfast for 2 weeks then stop, Disp: 7 tablet, Rfl: 0    diazepam (VALIUM) 5 mg tablet, Take 1 tablet (5 mg total) by mouth daily at bedtime, Disp: 30 tablet, Rfl: 2    gabapentin (NEURONTIN) 300 mg capsule, 2 capsules PO at bedtime, Disp: , Rfl: 0    HYDROmorphone (DILAUDID) 8 MG tablet, Take 1 tablet (8 mg total) by mouth every 8 (eight) hours as needed for moderate pain Max Daily Amount: 24 mg, Disp: 75 tablet, Rfl: 0    OLANZapine (ZyPREXA) 5 mg tablet, Take 1 tablet (5 mg total) by mouth daily at bedtime, Disp: 30 tablet, Rfl: 2    ondansetron (ZOFRAN) 4 mg tablet, Take 4 mg by mouth every 6 (six) hours as needed for nausea or vomiting , Disp: , Rfl:     promethazine (PHENERGAN) 25 mg tablet, Take 1 tablet (25 mg total) by mouth every 6 (six) hours as needed for nausea or vomiting, Disp: 60 tablet, Rfl: 2    senna (SENOKOT) 8 6 mg, Take 2 tablets by mouth 2 (two) times a day, Disp: , Rfl:     ammonium lactate (LAC-HYDRIN) 12 % cream, Apply topically as needed for dry skin, Disp: 385 g, Rfl: 0  Review of Systems   Constitutional: Positive for activity change, appetite change, fatigue and unexpected weight change  HENT: Negative  Eyes: Negative  Respiratory: Negative  Cardiovascular: Negative  Gastrointestinal: Negative  Endocrine: Negative  Genitourinary: Negative  Musculoskeletal: Positive for back pain  Skin: Negative  Allergic/Immunologic: Negative  Neurological: Negative  Hematological: Negative  Psychiatric/Behavioral: Positive for sleep disturbance  Objective:  Vital Signs  /50 (BP Location: Left arm, Patient Position: Sitting, Cuff Size: Standard)   Pulse 97   Temp 100 °F (37 8 °C) (Tympanic)   Resp 16   Ht 5' 5" (1 651 m)   Wt 71 7 kg (158 lb)   LMP  (LMP Unknown)   SpO2 95%   BMI 26 29 kg/m²    Physical Exam    Physical Exam   Constitutional: Ill appearing with sallow coloring  No distress  HENT:   Head: Normocephalic and atraumatic  Wearing a wig  Eyes: EOM are normal  Right eye exhibits no discharge  Left eye exhibits no discharge  No scleral icterus  Pulmonary/Chest: Effort normal  No stridor  No respiratory distress  Abdominal: Distended but not tense  Looks like ascites but most recent imaging does not support this  Musculoskeletal: No edema  Neurological: Alert, oriented and appropriately conversant  Skin: Skin is dry  Psychiatric: Displays a normal mood and affect  Behavior, judgement and thought content appear normal    Vitals reviewed  No

## 2020-06-22 NOTE — CHART NOTE - NSCHARTNOTEFT_GEN_A_CORE
pt 37 weels P0 admitted for pyelonephritis    I was called to pat room for prolonged deceration confirmed on sono  FH at 120 bpm upon entering room    plan to open or and transfer pt to L and D to watch FHT and if necessary perform emergent  section  Patient transferred to LDR and placed on the monitor  FHT baseline 120 but deceleration still noted bedside sono by Dr. Solis done    decision made to perform  section  consent of patient done in  [place] citizen by resident and reaffirmed by google translate on way to OR    Yoly ASHLEY

## 2020-06-22 NOTE — CONSULT NOTE ADULT - ASSESSMENT
30yo G1 at 37.3 wks with UTI, sepsis from E.Coli
30 yo F G1 at 37.3 wks presents with left flank pain since yesterday, currently 6/10 on pain scale  Fever, leukocytosis  Positive UA, UCX with GNR  BCX NGTD  USG with mild L hydro, L nonobstructing stone  S/p C section today, in setting of prolonged decl  Overall,  1) Pyelonephritis  - Meropenem 1g q 8 for now  - Plan to narrow abx based on UCX, F/U UCX  2) Fever  - Likely due to pyelo alone  - Follow up BCXs  - Monitor for further fevers  3) Leukocytosis  - Trend to normal    John Hill MD  Pager [phone]  After 5pm and on weekends call [phone]

## 2020-06-22 NOTE — CHART NOTE - NSCHARTNOTEFT_GEN_A_CORE
R4 Event Note: Back note from 10am due to patient care:     Code OB called and OR opened due to prolonged decel lasting approx 10min long.     Dr. Akhtar, Dr. Solis and I responded to pt's bedside on 4KN where RN and SKYLER Corrales were at bedside. During decel SKYLER Corrales had completed BSS demonstrating FH in 90s. While at bedside pt with continued decels without palpable ctxs. Pt denies complaints of pain, leaking fluid, ctxns. +FM. Upon transfer to L&D FHR noted with decel to 90-100s despite resuscitation with repositioning and IV fluids therefore decision made to proceed with C/S. Upon transfer to OR FH in 90s therefore decision made to proceed with emergent C/S under general anesthesia.     d/w Dr. Solis and Dr. Akhtar  [facility] PGY3

## 2020-06-22 NOTE — CONSULT NOTE ADULT - SUBJECTIVE AND OBJECTIVE BOX
"HPI: Patient is a 30yo G1 at 37.3 wks presents with left flank pain since yesterday, currently 6/10 on pain scale. Denies VB/LOF/Ctx. Reports positive fetal movement GBS negative on . Patient also has 3x4 burn blister to right bottom, cause for leakage of hot water used as warm compress for back pain.  PNI: :  presenting for pruritus of palms and soles, treated for suspected cholestasis started on ursodiol Bile acids 4.7., also treated for positive UTI with Keflex           admitted to MICU with sepsis from suspected left pyelonephritis and a non-obstructing 1cm left renal stone. Patient was treated with IV CTX and sent home with a PO course of keflex, which she completed two weeks ago. ID was consulted and patient was ruled out for  COVID with 3 negative PCRs and a normal CXR. Hospital course was complicated by an RRT on . Due to persistent tachycardia, patient had CT Angio which showed bilateral patchy opacities and TTE that was normal (EF 65%).      Pob: current pregnancy  Pgyn: denies h/o STIs  PMH: left pyelonephritis, left nephrolithiasis  PSH: none  Meds: iron, PNV, vit D  All: NKDA  Social: endorses h/o smoking socially prior to pregnancy. Denies use of alcohol or drugs during pregnancy. (2020 23:22)"    Above reviewed. Saw/spoke to patient. Patient initially presenting with L sided flank pain. No fevers, no chills. No new complaints. Today, patient s/p C section. No dysuria, no bladder pain. No abd pain aside from surgical site. No N/V. No SOB, no cough. ID called for further eval.    PAST MEDICAL & SURGICAL HISTORY:  Pyelonephritis affecting pregnancy: admit 2020 rx with cephtriaxone  No significant past surgical history    Allergies    No Known Allergies    ANTIMICROBIALS:  meropenem  IVPB 1000 every 8 hours    OTHER MEDS:  acetaminophen   Tablet .. 975 milliGRAM(s) Oral <User Schedule>  diphenhydrAMINE 25 milliGRAM(s) Oral every 6 hours PRN  diphtheria/tetanus/pertussis (acellular) Vaccine (ADAcel) 0.5 milliLiter(s) IntraMuscular once  heparin   Injectable 5000 Unit(s) SubCutaneous every 12 hours  HYDROmorphone PCA (1 mG/mL) 30 milliLiter(s) PCA Continuous PCA Continuous  HYDROmorphone PCA (1 mG/mL) Rescue Clinician Bolus 0.5 milliGRAM(s) IV Push every 15 minutes PRN  ibuprofen  Tablet. 600 milliGRAM(s) Oral every 6 hours  ketorolac   Injectable 30 milliGRAM(s) IV Push every 6 hours  lactated ringers. 1000 milliLiter(s) IV Continuous <Continuous>  lanolin Ointment 1 Application(s) Topical every 6 hours PRN  magnesium hydroxide Suspension 30 milliLiter(s) Oral two times a day PRN  naloxone Injectable 0.1 milliGRAM(s) IV Push every 3 minutes PRN  ondansetron Injectable 4 milliGRAM(s) IV Push every 6 hours PRN  oxyCODONE    IR 5 milliGRAM(s) Oral every 3 hours PRN  oxyCODONE    IR 5 milliGRAM(s) Oral once PRN  oxytocin Infusion 333.333 milliUNIT(s)/Min IV Continuous <Continuous>  simethicone 80 milliGRAM(s) Chew every 4 hours PRN    SOCIAL HISTORY: No tobacco, no alcohol, no illicit drugs    FAMILY HISTORY: No pertinent family history relating to pyelonephritis    Drug Dosing Weight  Height (cm): 154.94 (2020 00:25)  Weight (kg): 75.3 (2020 00:25)  BMI (kg/m2): 31.4 (2020 00:25)  BSA (m2): 1.75 (2020 00:25)    PE:    Vital Signs Last 24 Hrs  T(C): 37.4 (2020 06:07), Max: 39.5 (2020 13:53)  T(F): 99.4 (2020 06:07), Max: 103.1 (2020 13:53)  HR: 78 (2020 12:00) (64 - 116)  BP: 105/57 (2020 12:00) (85/58 - 141/76)  BP(mean): 68 (2020 12:00) (63 - 91)  RR: 18 (2020 12:00) (16 - 19)  SpO2: 100% (2020 12:00) (90% - 100%)    Gen: AOx3, NAD, non-toxic  CV: S1+S2 normal, nontachycardic  Resp: Clear bilat, no resp distress, no crackles/wheezes  Abd: Surgical site bandaged, appears clean  Ext: No LE edema, no wounds  : No suprapubic tenderness  IV/Skin: No thrombophlebitis, no rash  Msk: No low back pain, no arthralgias, no joint swelling  Neuro: No sensory deficits, no motor deficits    LABS:                        10.4   13.09 )-----------( 222      ( 2020 06:20 )             32.1         133<L>  |  102  |  5<L>  ----------------------------<  61<L>  3.6   |  13<L>  |  0.52    Ca    8.8      2020 06:20    TPro  6.3  /  Alb  3.1<L>  /  TBili  0.3  /  DBili  x   /  AST  34<H>  /  ALT  48<H>  /  AlkPhos  229<H>      Urinalysis Basic - ( 2020 22:00 )    Color: LIGHT YELLOW / Appearance: Lt TURBID / S.009 / pH: 7.5  Gluc: NEGATIVE / Ketone: NEGATIVE  / Bili: NEGATIVE / Urobili: NORMAL   Blood: NEGATIVE / Protein: 10 / Nitrite: NEGATIVE   Leuk Esterase: LARGE / RBC: 0-2 / WBC 11-25   Sq Epi: OCC / Non Sq Epi: x / Bacteria: MANY    MICROBIOLOGY:    .Urine Clean Catch (Midstream)  20   >100,000 CFU/ml Gram Negative Rods    .Blood Blood  20   No growth to date.    .Beta Strep  20   No Beta Strep group B isolated     .Urine Clean Catch (Midstream)  20   >100,000 CFU/ml Escherichia coli  --  Escherichia coli    RADIOLOGY:     USG:    FINDINGS:    Right kidney: 11.4 cm. No hydronephrosis, mass or renal calculi.    Left kidney: 12.1 cm. Redemonstration of a nonobstructing 8 mm lower pole renal calculus. Mild hydronephrosis.    Urinary bladder: Collapsed post void urinary bladder.    Miscellaneous: Hepatic steatosis.    IMPRESSION:     Mild left hydronephrosis. Nonobstructing left lower pole renal calculus.

## 2020-06-22 NOTE — BRIEF OPERATIVE NOTE - NSICDXBRIEFPOSTOP_GEN_ALL_CORE_FT
POST-OP DIAGNOSIS:  Fetal heart rate/rhythm abnormality, antepartum 23-Jun-2020 21:02:00  Guadalupe Montilla  Pyelonephritis affecting pregnancy 23-Jun-2020 21:01:11 Pyelonephritis affecting pregnancy Guadalupe Montilla

## 2020-06-22 NOTE — PROGRESS NOTE ADULT - PROBLEM SELECTOR PLAN 1
Neuro: PO Analgesia PRN.  CV: Hemodynamically stable. Monitor VS.   Pulm: Saturating well on room air.  GI: c/w regular diet. Persistent, stable, mild transaminitis noted. Patient has been r/o for ICP outpatient, normotensive. Trend stable.  : Voiding spontaneously. Renal sono with mild L hydro and nonobstructing stone.  FEN: Electrolytes: LR@250cc/hr. Bolus as needed for insensible losses.  Heme: DVT ppx w/ SCD's while in bed.   ID: Febrile to 39.5 at 130p yesterday, currently afebrile. Switched to meropenem from ceftriaxone for pyelonephritis. F/u Ucx, Bcx. ID consulted. MICU consult placed with no need for transfer at this time.  Endo: No active issues   Fetus: ATU Sono (6/2): vtx, ant placenta, JOSELINE 13.2, EFW 2449g, BPP 8/8. NST BID. Will repeat today. NST BID reassuring. At the time of maternal fever, teal tachycardia noted but it did improve once defervesced.  Dispo: Continue routine inpatient care    ANNA Montilla PGY-3

## 2020-06-22 NOTE — OB PROVIDER DELIVERY SUMMARY - [facility]
Isotretinoin Pregnancy And Lactation Text: This medication is Pregnancy Category X and is considered extremely dangerous during pregnancy. It is unknown if it is excreted in breast milk. Quinolones Pregnancy And Lactation Text: This medication is Pregnancy Category C and it isn't know if it is safe during pregnancy. It is also excreted in breast milk. Thalidomide Counseling: I discussed with the patient the risks of thalidomide including but not limited to birth defects, anxiety, weakness, chest pain, dizziness, cough and severe allergy. Hydroxychloroquine Counseling:  I discussed with the patient that a baseline ophthalmologic exam is needed at the start of therapy and every year thereafter while on therapy. A CBC may also be warranted for monitoring.  The side effects of this medication were discussed with the patient, including but not limited to agranulocytosis, aplastic anemia, seizures, rashes, retinopathy, and liver toxicity. Patient instructed to call the office should any adverse effect occur.  The patient verbalized understanding of the proper use and possible adverse effects of Plaquenil.  All the patient's questions and concerns were addressed. Benzoyl Peroxide Counseling: Patient counseled that medicine may cause skin irritation and bleach clothing.  In the event of skin irritation, the patient was advised to reduce the amount of the drug applied or use it less frequently.   The patient verbalized understanding of the proper use and possible adverse effects of benzoyl peroxide.  All of the patient's questions and concerns were addressed. Cyclosporine Pregnancy And Lactation Text: This medication is Pregnancy Category C and it isn't know if it is safe during pregnancy. This medication is excreted in breast milk. Birth Control Pills Pregnancy And Lactation Text: This medication should be avoided if pregnant and for the first 30 days post-partum. Eucrisa Pregnancy And Lactation Text: This medication has not been assigned a Pregnancy Risk Category but animal studies failed to show danger with the topical medication. It is unknown if the medication is excreted in breast milk. Otezla Pregnancy And Lactation Text: This medication is Pregnancy Category C and it isn't known if it is safe during pregnancy. It is unknown if it is excreted in breast milk. Hydroxyzine Pregnancy And Lactation Text: This medication is not safe during pregnancy and should not be taken. It is also excreted in breast milk and breast feeding isn't recommended. Minocycline Pregnancy And Lactation Text: This medication is Pregnancy Category D and not consider safe during pregnancy. It is also excreted in breast milk. Hydroxyzine Counseling: Patient advised that the medication is sedating and not to drive a car after taking this medication.  Patient informed of potential adverse effects including but not limited to dry mouth, urinary retention, and blurry vision.  The patient verbalized understanding of the proper use and possible adverse effects of hydroxyzine.  All of the patient's questions and concerns were addressed. Cosentyx Pregnancy And Lactation Text: This medication is Pregnancy Category B and is considered safe during pregnancy. It is unknown if this medication is excreted in breast milk. Solaraze Pregnancy And Lactation Text: This medication is Pregnancy Category B and is considered safe. There is some data to suggest avoiding during the third trimester. It is unknown if this medication is excreted in breast milk. Topical Sulfur Applications Pregnancy And Lactation Text: This medication is Pregnancy Category C and has an unknown safety profile during pregnancy. It is unknown if this topical medication is excreted in breast milk. Acitretin Counseling:  I discussed with the patient the risks of acitretin including but not limited to hair loss, dry lips/skin/eyes, liver damage, hyperlipidemia, depression/suicidal ideation, photosensitivity.  Serious rare side effects can include but are not limited to pancreatitis, pseudotumor cerebri, bony changes, clot formation/stroke/heart attack.  Patient understands that alcohol is contraindicated since it can result in liver toxicity and significantly prolong the elimination of the drug by many years. Elidel Pregnancy And Lactation Text: This medication is Pregnancy Category C. It is unknown if this medication is excreted in breast milk. Otezla Counseling: The side effects of Otezla were discussed with the patient, including but not limited to worsening or new depression, weight loss, diarrhea, nausea, upper respiratory tract infection, and headache. Patient instructed to call the office should any adverse effect occur.  The patient verbalized understanding of the proper use and possible adverse effects of Otezla.  All the patient's questions and concerns were addressed. Cephalexin Counseling: I counseled the patient regarding use of cephalexin as an antibiotic for prophylactic and/or therapeutic purposes. Cephalexin (commonly prescribed under brand name Keflex) is a cephalosporin antibiotic which is active against numerous classes of bacteria, including most skin bacteria. Side effects may include nausea, diarrhea, gastrointestinal upset, rash, hives, yeast infections, and in rare cases, hepatitis, kidney disease, seizures, fever, confusion, neurologic symptoms, and others. Patients with severe allergies to penicillin medications are cautioned that there is about a 10% incidence of cross-reactivity with cephalosporins. When possible, patients with penicillin allergies should use alternatives to cephalosporins for antibiotic therapy. Tetracycline Counseling: Patient counseled regarding possible photosensitivity and increased risk for sunburn.  Patient instructed to avoid sunlight, if possible.  When exposed to sunlight, patients should wear protective clothing, sunglasses, and sunscreen.  The patient was instructed to call the office immediately if the following severe adverse effects occur:  hearing changes, easy bruising/bleeding, severe headache, or vision changes.  The patient verbalized understanding of the proper use and possible adverse effects of tetracycline.  All of the patient's questions and concerns were addressed. Patient understands to avoid pregnancy while on therapy due to potential birth defects. Gabapentin Pregnancy And Lactation Text: This medication is Pregnancy Category C and isn't considered safe during pregnancy. It is excreted in breast milk. Nsaids Counseling: NSAID Counseling: I discussed with the patient that NSAIDs should be taken with food. Prolonged use of NSAIDs can result in the development of stomach ulcers.  Patient advised to stop taking NSAIDs if abdominal pain occurs.  The patient verbalized understanding of the proper use and possible adverse effects of NSAIDs.  All of the patient's questions and concerns were addressed. Albendazole Pregnancy And Lactation Text: This medication is Pregnancy Category C and it isn't known if it is safe during pregnancy. It is also excreted in breast milk. Arava Pregnancy And Lactation Text: This medication is Pregnancy Category X and is absolutely contraindicated during pregnancy. It is unknown if it is excreted in breast milk. Taltz Pregnancy And Lactation Text: The risk during pregnancy and breastfeeding is uncertain with this medication. None Protopic Counseling: Patient may experience a mild burning sensation during topical application. Protopic is not approved in children less than 2 years of age. There have been case reports of hematologic and skin malignancies in patients using topical calcineurin inhibitors although causality is questionable. Xolair Pregnancy And Lactation Text: This medication is Pregnancy Category B and is considered safe during pregnancy. This medication is excreted in breast milk. Spironolactone Pregnancy And Lactation Text: This medication can cause feminization of the male fetus and should be avoided during pregnancy. The active metabolite is also found in breast milk. Topical Clindamycin Pregnancy And Lactation Text: This medication is Pregnancy Category B and is considered safe during pregnancy. It is unknown if it is excreted in breast milk. Cellcept Pregnancy And Lactation Text: This medication is Pregnancy Category D and isn't considered safe during pregnancy. It is unknown if this medication is excreted in breast milk. Drysol Pregnancy And Lactation Text: This medication is considered safe during pregnancy and breast feeding. Ketoconazole Counseling:   Patient counseled regarding improving absorption with orange juice.  Adverse effects include but are not limited to breast enlargement, headache, diarrhea, nausea, upset stomach, liver function test abnormalities, taste disturbance, and stomach pain.  There is a rare possibility of liver failure that can occur when taking ketoconazole. The patient understands that monitoring of LFTs may be required, especially at baseline. The patient verbalized understanding of the proper use and possible adverse effects of ketoconazole.  All of the patient's questions and concerns were addressed. Stelara Counseling:  I discussed with the patient the risks of ustekinumab including but not limited to immunosuppression, malignancy, posterior leukoencephalopathy syndrome, and serious infections.  The patient understands that monitoring is required including a PPD at baseline and must alert us or the primary physician if symptoms of infection or other concerning signs are noted. Enbrel Counseling:  I discussed with the patient the risks of etanercept including but not limited to myelosuppression, immunosuppression, autoimmune hepatitis, demyelinating diseases, lymphoma, and infections.  The patient understands that monitoring is required including a PPD at baseline and must alert us or the primary physician if symptoms of infection or other concerning signs are noted. Prednisone Counseling:  I discussed with the patient the risks of prolonged use of prednisone including but not limited to weight gain, insomnia, osteoporosis, mood changes, diabetes, susceptibility to infection, glaucoma and high blood pressure.  In cases where prednisone use is prolonged, patients should be monitored with blood pressure checks, serum glucose levels and an eye exam.  Additionally, the patient may need to be placed on GI prophylaxis, PCP prophylaxis, and calcium and vitamin D supplementation and/or a bisphosphonate.  The patient verbalized understanding of the proper use and the possible adverse effects of prednisone.  All of the patient's questions and concerns were addressed. Ivermectin Counseling:  Patient instructed to take medication on an empty stomach with a full glass of water.  Patient informed of potential adverse effects including but not limited to nausea, diarrhea, dizziness, itching, and swelling of the extremities or lymph nodes.  The patient verbalized understanding of the proper use and possible adverse effects of ivermectin.  All of the patient's questions and concerns were addressed. Picato Counseling:  I discussed with the patient the risks of Picato including but not limited to erythema, scaling, itching, weeping, crusting, and pain. Metronidazole Pregnancy And Lactation Text: This medication is Pregnancy Category B and considered safe during pregnancy.  It is also excreted in breast milk. Tazorac Counseling:  Patient advised that medication is irritating and drying.  Patient may need to apply sparingly and wash off after an hour before eventually leaving it on overnight.  The patient verbalized understanding of the proper use and possible adverse effects of tazorac.  All of the patient's questions and concerns were addressed. Solaraze Counseling:  I discussed with the patient the risks of Solaraze including but not limited to erythema, scaling, itching, weeping, crusting, and pain. Quinolones Counseling:  I discussed with the patient the risks of fluoroquinolones including but not limited to GI upset, allergic reaction, drug rash, diarrhea, dizziness, photosensitivity, yeast infections, liver function test abnormalities, tendonitis/tendon rupture. Drysol Counseling:  I discussed with the patient the risks of drysol/aluminum chloride including but not limited to skin rash, itching, irritation, burning. High Dose Vitamin A Pregnancy And Lactation Text: High dose vitamin A therapy is contraindicated during pregnancy and breast feeding. Isotretinoin Counseling: Patient should get monthly blood tests, not donate blood, not drive at night if vision affected, not share medication, and not undergo elective surgery for 6 months after tx completed. Side effects reviewed, pt to contact office should one occur. Rituxan Counseling:  I discussed with the patient the risks of Rituxan infusions. Side effects can include infusion reactions, severe drug rashes including mucocutaneous reactions, reactivation of latent hepatitis and other infections and rarely progressive multifocal leukoencephalopathy.  All of the patient's questions and concerns were addressed. Glycopyrrolate Pregnancy And Lactation Text: This medication is Pregnancy Category B and is considered safe during pregnancy. It is unknown if it is excreted breast milk. High Dose Vitamin A Counseling: Side effects reviewed, pt to contact office should one occur. Sski Pregnancy And Lactation Text: This medication is Pregnancy Category D and isn't considered safe during pregnancy. It is excreted in breast milk. Xeljanz Counseling: I discussed with the patient the risks of Xeljanz therapy including increased risk of infection, liver issues, headache, diarrhea, or cold symptoms. Live vaccines should be avoided. They were instructed to call if they have any problems. Arava Counseling:  Patient counseled regarding adverse effects of Arava including but not limited to nausea, vomiting, abnormalities in liver function tests. Patients may develop mouth sores, rash, diarrhea, and abnormalities in blood counts. The patient understands that monitoring is required including LFTs and blood counts.  There is a rare possibility of scarring of the liver and lung problems that can occur when taking methotrexate. Persistent nausea, loss of appetite, pale stools, dark urine, cough, and shortness of breath should be reported immediately. Patient advised to discontinue Arava treatment and consult with a physician prior to attempting conception. The patient will have to undergo a treatment to eliminate Arava from the body prior to conception. Terbinafine Pregnancy And Lactation Text: This medication is Pregnancy Category B and is considered safe during pregnancy. It is also excreted in breast milk and breast feeding isn't recommended. 5-Fu Counseling: 5-Fluorouracil Counseling:  I discussed with the patient the risks of 5-fluorouracil including but not limited to erythema, scaling, itching, weeping, crusting, and pain. Detail Level: Zone Topical Retinoid counseling:  Patient advised to apply a pea-sized amount only at bedtime and wait 30 minutes after washing their face before applying.  If too drying, patient may add a non-comedogenic moisturizer. The patient verbalized understanding of the proper use and possible adverse effects of retinoids.  All of the patient's questions and concerns were addressed. Griseofulvin Counseling:  I discussed with the patient the risks of griseofulvin including but not limited to photosensitivity, cytopenia, liver damage, nausea/vomiting and severe allergy.  The patient understands that this medication is best absorbed when taken with a fatty meal (e.g., ice cream or french fries). Acitretin Pregnancy And Lactation Text: This medication is Pregnancy Category X and should not be given to women who are pregnant or may become pregnant in the future. This medication is excreted in breast milk. Cosentyx Counseling:  I discussed with the patient the risks of Cosentyx including but not limited to worsening of Crohn's disease, immunosuppression, allergic reactions and infections.  The patient understands that monitoring is required including a PPD at baseline and must alert us or the primary physician if symptoms of infection or other concerning signs are noted. Rifampin Pregnancy And Lactation Text: This medication is Pregnancy Category C and it isn't know if it is safe during pregnancy. It is also excreted in breast milk and should not be used if you are breast feeding. Azithromycin Counseling:  I discussed with the patient the risks of azithromycin including but not limited to GI upset, allergic reaction, drug rash, diarrhea, and yeast infections. Griseofulvin Pregnancy And Lactation Text: This medication is Pregnancy Category X and is known to cause serious birth defects. It is unknown if this medication is excreted in breast milk but breast feeding should be avoided. Bexarotene Counseling:  I discussed with the patient the risks of bexarotene including but not limited to hair loss, dry lips/skin/eyes, liver abnormalities, hyperlipidemia, pancreatitis, depression/suicidal ideation, photosensitivity, drug rash/allergic reactions, hypothyroidism, anemia, leukopenia, infection, cataracts, and teratogenicity.  Patient understands that they will need regular blood tests to check lipid profile, liver function tests, white blood cell count, thyroid function tests and pregnancy test if applicable. Valtrex Pregnancy And Lactation Text: this medication is Pregnancy Category B and is considered safe during pregnancy. This medication is not directly found in breast milk but it's metabolite acyclovir is present. Clofazimine Counseling:  I discussed with the patient the risks of clofazimine including but not limited to skin and eye pigmentation, liver damage, nausea/vomiting, gastrointestinal bleeding and allergy. Cyclosporine Counseling:  I discussed with the patient the risks of cyclosporine including but not limited to hypertension, gingival hyperplasia,myelosuppression, immunosuppression, liver damage, kidney damage, neurotoxicity, lymphoma, and serious infections. The patient understands that monitoring is required including baseline blood pressure, CBC, CMP, lipid panel and uric acid, and then 1-2 times monthly CMP and blood pressure. Ketoconazole Pregnancy And Lactation Text: This medication is Pregnancy Category C and it isn't know if it is safe during pregnancy. It is also excreted in breast milk and breast feeding isn't recommended. Spironolactone Counseling: Patient advised regarding risks of diarrhea, abdominal pain, hyperkalemia, birth defects (for female patients), liver toxicity and renal toxicity. The patient may need blood work to monitor liver and kidney function and potassium levels while on therapy. The patient verbalized understanding of the proper use and possible adverse effects of spironolactone.  All of the patient's questions and concerns were addressed. Dapsone Pregnancy And Lactation Text: This medication is Pregnancy Category C and is not considered safe during pregnancy or breast feeding. Metronidazole Counseling:  I discussed with the patient the risks of metronidazole including but not limited to seizures, nausea/vomiting, a metallic taste in the mouth, nausea/vomiting and severe allergy. Erythromycin Counseling:  I discussed with the patient the risks of erythromycin including but not limited to GI upset, allergic reaction, drug rash, diarrhea, increase in liver enzymes, and yeast infections. Humira Counseling:  I discussed with the patient the risks of adalimumab including but not limited to myelosuppression, immunosuppression, autoimmune hepatitis, demyelinating diseases, lymphoma, and serious infections.  The patient understands that monitoring is required including a PPD at baseline and must alert us or the primary physician if symptoms of infection or other concerning signs are noted. Methotrexate Pregnancy And Lactation Text: This medication is Pregnancy Category X and is known to cause fetal harm. This medication is excreted in breast milk. Carac Counseling:  I discussed with the patient the risks of Carac including but not limited to erythema, scaling, itching, weeping, crusting, and pain. Doxepin Counseling:  Patient advised that the medication is sedating and not to drive a car after taking this medication. Patient informed of potential adverse effects including but not limited to dry mouth, urinary retention, and blurry vision.  The patient verbalized understanding of the proper use and possible adverse effects of doxepin.  All of the patient's questions and concerns were addressed. Eucrisa Counseling: Patient may experience a mild burning sensation during topical application. Eucrisa is not approved in children less than 2 years of age. Doxycycline Pregnancy And Lactation Text: This medication is Pregnancy Category D and not consider safe during pregnancy. It is also excreted in breast milk but is considered safe for shorter treatment courses. Dupixent Counseling: I discussed with the patient the risks of dupilumab including but not limited to eye infection and irritation, cold sores, injection site reactions, worsening of asthma, allergic reactions and increased risk of parasitic infection.  Live vaccines should be avoided while taking dupilumab. Dupilumab will also interact with certain medications such as warfarin and cyclosporine. The patient understands that monitoring is required and they must alert us or the primary physician if symptoms of infection or other concerning signs are noted. Dupixent Pregnancy And Lactation Text: This medication likely crosses the placenta but the risk for the fetus is uncertain. This medication is excreted in breast milk. Nsaids Pregnancy And Lactation Text: These medications are considered safe up to 30 weeks gestation. It is excreted in breast milk. Terbinafine Counseling: Patient counseling regarding adverse effects of terbinafine including but not limited to headache, diarrhea, rash, upset stomach, liver function test abnormalities, itching, taste/smell disturbance, nausea, abdominal pain, and flatulence.  There is a rare possibility of liver failure that can occur when taking terbinafine.  The patient understands that a baseline LFT and kidney function test may be required. The patient verbalized understanding of the proper use and possible adverse effects of terbinafine.  All of the patient's questions and concerns were addressed. Azathioprine Counseling:  I discussed with the patient the risks of azathioprine including but not limited to myelosuppression, immunosuppression, hepatotoxicity, lymphoma, and infections.  The patient understands that monitoring is required including baseline LFTs, Creatinine, possible TPMP genotyping and weekly CBCs for the first month and then every 2 weeks thereafter.  The patient verbalized understanding of the proper use and possible adverse effects of azathioprine.  All of the patient's questions and concerns were addressed. Zyclara Counseling:  I discussed with the patient the risks of imiquimod including but not limited to erythema, scaling, itching, weeping, crusting, and pain.  Patient understands that the inflammatory response to imiquimod is variable from person to person and was educated regarded proper titration schedule.  If flu-like symptoms develop, patient knows to discontinue the medication and contact us. Cephalexin Pregnancy And Lactation Text: This medication is Pregnancy Category B and considered safe during pregnancy.  It is also excreted in breast milk but can be used safely for shorter doses. Colchicine Counseling:  Patient counseled regarding adverse effects including but not limited to stomach upset (nausea, vomiting, stomach pain, or diarrhea).  Patient instructed to limit alcohol consumption while taking this medication.  Colchicine may reduce blood counts especially with prolonged use.  The patient understands that monitoring of kidney function and blood counts may be required, especially at baseline. The patient verbalized understanding of the proper use and possible adverse effects of colchicine.  All of the patient's questions and concerns were addressed. Topical Clindamycin Counseling: Patient counseled that this medication may cause skin irritation or allergic reactions.  In the event of skin irritation, the patient was advised to reduce the amount of the drug applied or use it less frequently.   The patient verbalized understanding of the proper use and possible adverse effects of clindamycin.  All of the patient's questions and concerns were addressed. Erythromycin Pregnancy And Lactation Text: This medication is Pregnancy Category B and is considered safe during pregnancy. It is also excreted in breast milk. Valtrex Counseling: I discussed with the patient the risks of valacyclovir including but not limited to kidney damage, nausea, vomiting and severe allergy.  The patient understands that if the infection seems to be worsening or is not improving, they are to call. Cimetidine Counseling:  I discussed with the patient the risks of Cimetidine including but not limited to gynecomastia, headache, diarrhea, nausea, drowsiness, arrhythmias, pancreatitis, skin rashes, psychosis, bone marrow suppression and kidney toxicity. Taltz Counseling: I discussed with the patient the risks of ixekizumab including but not limited to immunosuppression, serious infections, worsening of inflammatory bowel disease and drug reactions.  The patient understands that monitoring is required including a PPD at baseline and must alert us or the primary physician if symptoms of infection or other concerning signs are noted. Elidel Counseling: Patient may experience a mild burning sensation during topical application. Elidel is not approved in children less than 2 years of age. There have been case reports of hematologic and skin malignancies in patients using topical calcineurin inhibitors although causality is questionable. Dapsone Counseling: I discussed with the patient the risks of dapsone including but not limited to hemolytic anemia, agranulocytosis, rashes, methemoglobinemia, kidney failure, peripheral neuropathy, headaches, GI upset, and liver toxicity.  Patients who start dapsone require monitoring including baseline LFTs and weekly CBCs for the first month, then every month thereafter.  The patient verbalized understanding of the proper use and possible adverse effects of dapsone.  All of the patient's questions and concerns were addressed. Topical Sulfur Applications Counseling: Topical Sulfur Counseling: Patient counseled that this medication may cause skin irritation or allergic reactions.  In the event of skin irritation, the patient was advised to reduce the amount of the drug applied or use it less frequently.   The patient verbalized understanding of the proper use and possible adverse effects of topical sulfur application.  All of the patient's questions and concerns were addressed. Albendazole Counseling:  I discussed with the patient the risks of albendazole including but not limited to cytopenia, kidney damage, nausea/vomiting and severe allergy.  The patient understands that this medication is being used in an off-label manner. Simponi Counseling:  I discussed with the patient the risks of golimumab including but not limited to myelosuppression, immunosuppression, autoimmune hepatitis, demyelinating diseases, lymphoma, and serious infections.  The patient understands that monitoring is required including a PPD at baseline and must alert us or the primary physician if symptoms of infection or other concerning signs are noted. Bexarotene Pregnancy And Lactation Text: This medication is Pregnancy Category X and should not be given to women who are pregnant or may become pregnant. This medication should not be used if you are breast feeding. Oxybutynin Counseling:  I discussed with the patient the risks of oxybutynin including but not limited to skin rash, drowsiness, dry mouth, difficulty urinating, and blurred vision. Bactrim Counseling:  I discussed with the patient the risks of sulfa antibiotics including but not limited to GI upset, allergic reaction, drug rash, diarrhea, dizziness, photosensitivity, and yeast infections.  Rarely, more serious reactions can occur including but not limited to aplastic anemia, agranulocytosis, methemoglobinemia, blood dyscrasias, liver or kidney failure, lung infiltrates or desquamative/blistering drug rashes. Rifampin Counseling: I discussed with the patient the risks of rifampin including but not limited to liver damage, kidney damage, red-orange body fluids, nausea/vomiting and severe allergy. Odomzo Counseling- I discussed with the patient the risks of Odomzo including but not limited to nausea, vomiting, diarrhea, constipation, weight loss, changes in the sense of taste, decreased appetite, muscle spasms, and hair loss.  The patient verbalized understanding of the proper use and possible adverse effects of Odomzo.  All of the patient's questions and concerns were addressed. Doxycycline Counseling:  Patient counseled regarding possible photosensitivity and increased risk for sunburn.  Patient instructed to avoid sunlight, if possible.  When exposed to sunlight, patients should wear protective clothing, sunglasses, and sunscreen.  The patient was instructed to call the office immediately if the following severe adverse effects occur:  hearing changes, easy bruising/bleeding, severe headache, or vision changes.  The patient verbalized understanding of the proper use and possible adverse effects of doxycycline.  All of the patient's questions and concerns were addressed. Fluconazole Counseling:  Patient counseled regarding adverse effects of fluconazole including but not limited to headache, diarrhea, nausea, upset stomach, liver function test abnormalities, taste disturbance, and stomach pain.  There is a rare possibility of liver failure that can occur when taking fluconazole.  The patient understands that monitoring of LFTs and kidney function test may be required, especially at baseline. The patient verbalized understanding of the proper use and possible adverse effects of fluconazole.  All of the patient's questions and concerns were addressed. 5-Fu Pregnancy And Lactation Text: This medication is Pregnancy Category X and contraindicated in pregnancy and in women who may become pregnant. It is unknown if this medication is excreted in breast milk. Itraconazole Counseling:  I discussed with the patient the risks of itraconazole including but not limited to liver damage, nausea/vomiting, neuropathy, and severe allergy.  The patient understands that this medication is best absorbed when taken with acidic beverages such as non-diet cola or ginger ale.  The patient understands that monitoring is required including baseline LFTs and repeat LFTs at intervals.  The patient understands that they are to contact us or the primary physician if concerning signs are noted. Minoxidil Counseling: Minoxidil is a topical medication which can increase blood flow where it is applied. It is uncertain how this medication increases hair growth. Side effects are uncommon and include stinging and allergic reactions. Methotrexate Counseling:  Patient counseled regarding adverse effects of methotrexate including but not limited to nausea, vomiting, abnormalities in liver function tests. Patients may develop mouth sores, rash, diarrhea, and abnormalities in blood counts. The patient understands that monitoring is required including LFT's and blood counts.  There is a rare possibility of scarring of the liver and lung problems that can occur when taking methotrexate. Persistent nausea, loss of appetite, pale stools, dark urine, cough, and shortness of breath should be reported immediately. Patient advised to discontinue methotrexate treatment at least three months before attempting to become pregnant.  I discussed the need for folate supplements while taking methotrexate.  These supplements can decrease side effects during methotrexate treatment. The patient verbalized understanding of the proper use and possible adverse effects of methotrexate.  All of the patient's questions and concerns were addressed. Birth Control Pills Counseling: Birth Control Pill Counseling: I discussed with the patient the potential side effects of OCPs including but not limited to increased risk of stroke, heart attack, thrombophlebitis, deep venous thrombosis, hepatic adenomas, breast changes, GI upset, headaches, and depression.  The patient verbalized understanding of the proper use and possible adverse effects of OCPs. All of the patient's questions and concerns were addressed. Imiquimod Counseling:  I discussed with the patient the risks of imiquimod including but not limited to erythema, scaling, itching, weeping, crusting, and pain.  Patient understands that the inflammatory response to imiquimod is variable from person to person and was educated regarded proper titration schedule.  If flu-like symptoms develop, patient knows to discontinue the medication and contact us. Xolair Counseling:  Patient informed of potential adverse effects including but not limited to fever, muscle aches, rash and allergic reactions.  The patient verbalized understanding of the proper use and possible adverse effects of Xolair.  All of the patient's questions and concerns were addressed. Hydroxychloroquine Pregnancy And Lactation Text: This medication has been shown to cause fetal harm but it isn't assigned a Pregnancy Risk Category. There are small amounts excreted in breast milk. Include Pregnancy/Lactation Warning?: No Tazorac Pregnancy And Lactation Text: This medication is not safe during pregnancy. It is unknown if this medication is excreted in breast milk. Gabapentin Counseling: I discussed with the patient the risks of gabapentin including but not limited to dizziness, somnolence, fatigue and ataxia. Erivedge Counseling- I discussed with the patient the risks of Erivedge including but not limited to nausea, vomiting, diarrhea, constipation, weight loss, changes in the sense of taste, decreased appetite, muscle spasms, and hair loss.  The patient verbalized understanding of the proper use and possible adverse effects of Erivedge.  All of the patient's questions and concerns were addressed. Xelgaryz Pregnancy And Lactation Text: This medication is Pregnancy Category D and is not considered safe during pregnancy.  The risk during breast feeding is also uncertain. Infliximab Counseling:  I discussed with the patient the risks of infliximab including but not limited to myelosuppression, immunosuppression, autoimmune hepatitis, demyelinating diseases, lymphoma, and serious infections.  The patient understands that monitoring is required including a PPD at baseline and must alert us or the primary physician if symptoms of infection or other concerning signs are noted. Doxepin Pregnancy And Lactation Text: This medication is Pregnancy Category C and it isn't known if it is safe during pregnancy. It is also excreted in breast milk and breast feeding isn't recommended. Minocycline Counseling: Patient advised regarding possible photosensitivity and discoloration of the teeth, skin, lips, tongue and gums.  Patient instructed to avoid sunlight, if possible.  When exposed to sunlight, patients should wear protective clothing, sunglasses, and sunscreen.  The patient was instructed to call the office immediately if the following severe adverse effects occur:  hearing changes, easy bruising/bleeding, severe headache, or vision changes.  The patient verbalized understanding of the proper use and possible adverse effects of minocycline.  All of the patient's questions and concerns were addressed. Hydroquinone Counseling:  Patient advised that medication may result in skin irritation, lightening (hypopigmentation), dryness, and burning.  In the event of skin irritation, the patient was advised to reduce the amount of the drug applied or use it less frequently.  Rarely, spots that are treated with hydroquinone can become darker (pseudoochronosis).  Should this occur, patient instructed to stop medication and call the office. The patient verbalized understanding of the proper use and possible adverse effects of hydroquinone.  All of the patient's questions and concerns were addressed. SSKI Counseling:  I discussed with the patient the risks of SSKI including but not limited to thyroid abnormalities, metallic taste, GI upset, fever, headache, acne, arthralgias, paraesthesias, lymphadenopathy, easy bleeding, arrhythmias, and allergic reaction. Cellcept Counseling:  I discussed with the patient the risks of mycophenolate mofetil including but not limited to infection/immunosuppression, GI upset, hypokalemia, hypercholesterolemia, bone marrow suppression, lymphoproliferative disorders, malignancy, GI ulceration/bleed/perforation, colitis, interstitial lung disease, kidney failure, progressive multifocal leukoencephalopathy, and birth defects.  The patient understands that monitoring is required including a baseline creatinine and regular CBC testing. In addition, patient must alert us immediately if symptoms of infection or other concerning signs are noted. Benzoyl Peroxide Pregnancy And Lactation Text: This medication is Pregnancy Category C. It is unknown if benzoyl peroxide is excreted in breast milk. Rituxan Pregnancy And Lactation Text: This medication is Pregnancy Category C and it isn't know if it is safe during pregnancy. It is unknown if this medication is excreted in breast milk but similar antibodies are known to be excreted. Azithromycin Pregnancy And Lactation Text: This medication is considered safe during pregnancy and is also secreted in breast milk. Tremfya Counseling: I discussed with the patient the risks of guselkumab including but not limited to immunosuppression, serious infections, worsening of inflammatory bowel disease and drug reactions.  The patient understands that monitoring is required including a PPD at baseline and must alert us or the primary physician if symptoms of infection or other concerning signs are noted. Protopic Pregnancy And Lactation Text: This medication is Pregnancy Category C. It is unknown if this medication is excreted in breast milk when applied topically. Glycopyrrolate Counseling:  I discussed with the patient the risks of glycopyrrolate including but not limited to skin rash, drowsiness, dry mouth, difficulty urinating, and blurred vision. Bactrim Pregnancy And Lactation Text: This medication is Pregnancy Category D and is known to cause fetal risk.  It is also excreted in breast milk.

## 2020-06-22 NOTE — OB RN INTRAOPERATIVE NOTE - NS_WOUNDCLASS_OBGYN_ALL_OB
PATIENT TRANSFERRED TO THE BEDSIDE COMMODE TODAY WITH JUST CGA FROM THERAPIST.
SHE WAS ABLE TO PERFORM SIT TO STAND FROM THE COMMODE AND WALKED WITH THE
ROLLING WALKER FROM THE BED TO THE DOOR AND BACK TO THE RECLINER. PATIENT HAS
10/10 REPORTED PAIN BUT WAS ABLE TO PERFORM SLOW WALKING IN THE ROOM WITH JUST
CGA. PATIENT WAS LEFT SITTING IN THE RECLINER WITH HER BREAKFAST TRAY. NO
UNTOWARD INCIDENT HAPPENED. 2

## 2020-06-22 NOTE — PROGRESS NOTE ADULT - SUBJECTIVE AND OBJECTIVE BOX
Responded to Code OB:  PAtient at 37 + weeks aditted for pyelonephritis was being monitored and had prolonged deceleration.  Vital signs were stable and FHR tracing initially responded to change in maternal position. She was brought down to labor and delivery and placed on monitor. Prolonged deceleration was still noted and cervix was long and closed.  As the deceleration persisted recommend immediate delivery by  section.  Plan of care was explained to patient in French.

## 2020-06-22 NOTE — OB RN DELIVERY SUMMARY - NS_SEPSISRSKCALC_OBGYN_ALL_OB_FT
EOS calculated successfully. EOS Risk Factor: 0.5/1000 live births ([facility] national incidence); GA=37w5d; Temp=103.1; ROM=0.05; GBS='Negative'; Antibiotics='No antibiotics or any antibiotics < 2 hrs prior to birth'

## 2020-06-22 NOTE — OB PROVIDER DELIVERY SUMMARY - NSPROVIDERDELIVERYNOTE_OBGYN_ALL_OB_FT
service attending    I participated in  section of liveborn infant    Yoly ASHLEY Liveborn male infant Apgars 7,9  Grossly normal uterus, bl tubes, ovaries  Fibrillar over hsyterotomy    service attending    I participated in  section of liveborn infant    Yoly ASHLEY

## 2020-06-22 NOTE — BRIEF OPERATIVE NOTE - NSICDXBRIEFPREOP_GEN_ALL_CORE_FT
PRE-OP DIAGNOSIS:  Pyelonephritis affecting pregnancy 23-Jun-2020 21:01:07 Pyelonephritis affecting pregnancy Guadalupe Montilla  Fetal heart rate/rhythm abnormality, antepartum 23-Jun-2020 21:01:57  Guadalupe Montilla

## 2020-06-23 ENCOUNTER — TRANSCRIPTION ENCOUNTER (OUTPATIENT)
Age: 30
End: 2020-06-23

## 2020-06-23 LAB
-  AMIKACIN: SIGNIFICANT CHANGE UP
-  AMPICILLIN/SULBACTAM: SIGNIFICANT CHANGE UP
-  AMPICILLIN: SIGNIFICANT CHANGE UP
-  AZTREONAM: SIGNIFICANT CHANGE UP
-  CEFAZOLIN: SIGNIFICANT CHANGE UP
-  CEFEPIME: SIGNIFICANT CHANGE UP
-  CEFOXITIN: SIGNIFICANT CHANGE UP
-  CEFTRIAXONE: SIGNIFICANT CHANGE UP
-  CIPROFLOXACIN: SIGNIFICANT CHANGE UP
-  GENTAMICIN: SIGNIFICANT CHANGE UP
-  IMIPENEM: SIGNIFICANT CHANGE UP
-  LEVOFLOXACIN: SIGNIFICANT CHANGE UP
-  MEROPENEM: SIGNIFICANT CHANGE UP
-  NITROFURANTOIN: SIGNIFICANT CHANGE UP
-  PIPERACILLIN/TAZOBACTAM: SIGNIFICANT CHANGE UP
-  TIGECYCLINE: SIGNIFICANT CHANGE UP
-  TOBRAMYCIN: SIGNIFICANT CHANGE UP
-  TRIMETHOPRIM/SULFAMETHOXAZOLE: SIGNIFICANT CHANGE UP
BASOPHILS # BLD AUTO: 0.02 K/UL — SIGNIFICANT CHANGE UP (ref 0–0.2)
BASOPHILS NFR BLD AUTO: 0.2 % — SIGNIFICANT CHANGE UP (ref 0–2)
BILE AC FLD-MCNC: 8.3 UMOL/L — SIGNIFICANT CHANGE UP (ref 0–10)
CULTURE RESULTS: SIGNIFICANT CHANGE UP
EOSINOPHIL # BLD AUTO: 0.08 K/UL — SIGNIFICANT CHANGE UP (ref 0–0.5)
EOSINOPHIL NFR BLD AUTO: 0.6 % — SIGNIFICANT CHANGE UP (ref 0–6)
HCT VFR BLD CALC: 28.3 % — LOW (ref 34.5–45)
HGB BLD-MCNC: 9.4 G/DL — LOW (ref 11.5–15.5)
IMM GRANULOCYTES NFR BLD AUTO: 0.8 % — SIGNIFICANT CHANGE UP (ref 0–1.5)
LYMPHOCYTES # BLD AUTO: 17.8 % — SIGNIFICANT CHANGE UP (ref 13–44)
LYMPHOCYTES # BLD AUTO: 2.26 K/UL — SIGNIFICANT CHANGE UP (ref 1–3.3)
MCHC RBC-ENTMCNC: 30.8 PG — SIGNIFICANT CHANGE UP (ref 27–34)
MCHC RBC-ENTMCNC: 33.2 % — SIGNIFICANT CHANGE UP (ref 32–36)
MCV RBC AUTO: 92.8 FL — SIGNIFICANT CHANGE UP (ref 80–100)
METHOD TYPE: SIGNIFICANT CHANGE UP
MONOCYTES # BLD AUTO: 0.74 K/UL — SIGNIFICANT CHANGE UP (ref 0–0.9)
MONOCYTES NFR BLD AUTO: 5.8 % — SIGNIFICANT CHANGE UP (ref 2–14)
NEUTROPHILS # BLD AUTO: 9.51 K/UL — HIGH (ref 1.8–7.4)
NEUTROPHILS NFR BLD AUTO: 74.8 % — SIGNIFICANT CHANGE UP (ref 43–77)
NRBC # FLD: 0 K/UL — SIGNIFICANT CHANGE UP (ref 0–0)
ORGANISM # SPEC MICROSCOPIC CNT: SIGNIFICANT CHANGE UP
ORGANISM # SPEC MICROSCOPIC CNT: SIGNIFICANT CHANGE UP
PLATELET # BLD AUTO: 248 K/UL — SIGNIFICANT CHANGE UP (ref 150–400)
PMV BLD: 11.5 FL — SIGNIFICANT CHANGE UP (ref 7–13)
RBC # BLD: 3.05 M/UL — LOW (ref 3.8–5.2)
RBC # FLD: 13.6 % — SIGNIFICANT CHANGE UP (ref 10.3–14.5)
SPECIMEN SOURCE: SIGNIFICANT CHANGE UP
WBC # BLD: 12.71 K/UL — HIGH (ref 3.8–10.5)
WBC # FLD AUTO: 12.71 K/UL — HIGH (ref 3.8–10.5)

## 2020-06-23 PROCEDURE — 99232 SBSQ HOSP IP/OBS MODERATE 35: CPT | Mod: GC

## 2020-06-23 PROCEDURE — 99232 SBSQ HOSP IP/OBS MODERATE 35: CPT

## 2020-06-23 RX ORDER — CEFAZOLIN SODIUM 1 G
1000 VIAL (EA) INJECTION EVERY 8 HOURS
Refills: 0 | Status: DISCONTINUED | OUTPATIENT
Start: 2020-06-23 | End: 2020-06-25

## 2020-06-23 RX ADMIN — Medication 975 MILLIGRAM(S): at 06:16

## 2020-06-23 RX ADMIN — Medication 30 MILLIGRAM(S): at 21:37

## 2020-06-23 RX ADMIN — Medication 975 MILLIGRAM(S): at 18:26

## 2020-06-23 RX ADMIN — Medication 100 MILLIGRAM(S): at 15:36

## 2020-06-23 RX ADMIN — HEPARIN SODIUM 5000 UNIT(S): 5000 INJECTION INTRAVENOUS; SUBCUTANEOUS at 18:26

## 2020-06-23 RX ADMIN — Medication 975 MILLIGRAM(S): at 00:18

## 2020-06-23 RX ADMIN — Medication 30 MILLIGRAM(S): at 06:16

## 2020-06-23 RX ADMIN — MEROPENEM 100 MILLIGRAM(S): 1 INJECTION INTRAVENOUS at 06:16

## 2020-06-23 RX ADMIN — HEPARIN SODIUM 5000 UNIT(S): 5000 INJECTION INTRAVENOUS; SUBCUTANEOUS at 06:15

## 2020-06-23 RX ADMIN — Medication 975 MILLIGRAM(S): at 12:50

## 2020-06-23 RX ADMIN — HYDROMORPHONE HYDROCHLORIDE 30 MILLILITER(S): 2 INJECTION INTRAMUSCULAR; INTRAVENOUS; SUBCUTANEOUS at 07:01

## 2020-06-23 RX ADMIN — Medication 30 MILLIGRAM(S): at 15:34

## 2020-06-23 NOTE — PROGRESS NOTE ADULT - ASSESSMENT
30 yo F G1 at 37.3 wks presents with left flank pain since yesterday, currently 6/10 on pain scale  Fever, leukocytosis  Positive UA, UCX with GNR  BCX NGTD  USG with mild L hydro, L nonobstructing stone  S/p C section  Doing well in post op period  Overall,  1) Pyelonephritis  - DC Susie, start Cefazolin 1g q 8  - When DC planning, anticipate would send on PO B lactam  2) Fever  - Likely due to pyelo  - Follow up BCXs  - Monitor for further fevers  3) Leukocytosis  - Trend to normal    John Hill MD  Pager [phone]  After 5pm and on weekends call [phone] 30 yo F G1 at 37.3 wks presents with left flank pain since yesterday, currently 6/10 on pain scale  Fever, leukocytosis  Positive UA, UCX with GNR  BCX NGTD  USG with mild L hydro, L nonobstructing stone  S/p C section  Doing well in post op period  Overall,  1) Pyelonephritis  - Culture now shows E coli R unasyn, amp, otherwise S  - DC Susie, start Cefazolin 1g q 8  - When DC planning, anticipate would send on PO B lactam  2) Fever  - Likely due to pyelo  - Follow up BCXs  - Monitor for further fevers  3) Leukocytosis  - Trend to normal    John Hill MD  Pager [phone]  After 5pm and on weekends call [phone] 28 yo F G1 at 37.3 wks presents with left flank pain since yesterday, currently 6/10 on pain scale  Fever, leukocytosis  Positive UA, UCX with GNR  BCX NGTD  USG with mild L hydro, L nonobstructing stone  S/p C section  Doing well in post op period  Overall,  1) Pyelonephritis  - Culture now shows E coli R unasyn, amp, otherwise S  - DC Susie, start Cefazolin 1g q 8  - When DC planning, anticipate would send on PO B lactam  2) Fever  - Likely due to pyelo  - Follow up BCXs  - Monitor for further fevers  3) Leukocytosis  - Trend to normal    My colleagues will be covering this patient on 6/24/20, I will return on 6/25/20. Please call [phone] or on call fellow with any questions or change in status.     John Hill MD  Pager [phone]  After 5pm and on weekends call [phone]

## 2020-06-23 NOTE — PROGRESS NOTE ADULT - SUBJECTIVE AND OBJECTIVE BOX
R3 OB PROGRESS NOTE    Patient's  translating for her    Patient seen and examined at bedside, no acute overnight events. No acute complaints, pain well controlled. Patient is ambulating, passing flatus, voiding spontaneously, and tolerating regular diet. Denies CP, SOB, N/V, fevers, and chills. She feels much improved and is hoping her baby comes up from NICU.    Vital Signs Last 24 Hours  T(C): 36.4 (06-23-20 @ 10:07), Max: 36.9 (06-22-20 @ 22:28)  HR: 74 (06-23-20 @ 10:07) (64 - 81)  BP: 105/62 (06-23-20 @ 10:07) (98/41 - 132/83)  RR: 18 (06-23-20 @ 10:07) (17 - 23)  SpO2: 100% (06-23-20 @ 10:07) (97% - 100%)    I&O's Detail    22 Jun 2020 07:01  -  23 Jun 2020 07:00  --------------------------------------------------------  IN:    lactated ringers.: 225 mL    Other: 1500 mL    oxytocin Infusion: 500 mL  Total IN: 2225 mL    OUT:    Estimated Blood Loss: 909 mL    Indwelling Catheter - Urethral: 1550 mL    Other: 300 mL    Voided: 550 mL  Total OUT: 3309 mL    Total NET: -1084 mL    Physical Exam:  General: NAD  CV: NR, RR, S1, S2, no M/R/G  Lungs: CTA-B  Abdomen: Soft, non-tender, non-distended, +BS  Incision: CDI with steris in place  Ext: No pain or swelling    Labs:             9.4<L>  12.71<H> )-----------( 248      ( 06-23 @ 06:00 )             28.3<L>               10.4<L>  13.09<H> )-----------( 222      ( 06-22 @ 06:20 )             32.1<L>               10.8<L>  13.77<H> )-----------( 219      ( 06-21 @ 14:25 )             33.3<L>               10.7<L>  14.76<H> )-----------( 220      ( 06-21 @ 07:35 )             31.6<L>               11.2<L>  11.72<H> )-----------( 237      ( 06-20 @ 22:00 )             34.5     MEDICATIONS  (STANDING):  acetaminophen   Tablet .. 975 milliGRAM(s) Oral <User Schedule>  ceFAZolin   IVPB 1000 milliGRAM(s) IV Intermittent every 8 hours  diphtheria/tetanus/pertussis (acellular) Vaccine (ADAcel) 0.5 milliLiter(s) IntraMuscular once  heparin   Injectable 5000 Unit(s) SubCutaneous every 12 hours  HYDROmorphone PCA (1 mG/mL) 30 milliLiter(s) PCA Continuous PCA Continuous  ibuprofen  Tablet. 600 milliGRAM(s) Oral every 6 hours  ketorolac   Injectable 30 milliGRAM(s) IV Push every 6 hours  oxytocin Infusion 333.333 milliUNIT(s)/Min (1000 mL/Hr) IV Continuous <Continuous>    MEDICATIONS  (PRN):  diphenhydrAMINE 25 milliGRAM(s) Oral every 6 hours PRN Itching  HYDROmorphone PCA (1 mG/mL) Rescue Clinician Bolus 0.5 milliGRAM(s) IV Push every 15 minutes PRN for Pain Scale GREATER THAN 6  lanolin Ointment 1 Application(s) Topical every 6 hours PRN Sore Nipples  magnesium hydroxide Suspension 30 milliLiter(s) Oral two times a day PRN Constipation  naloxone Injectable 0.1 milliGRAM(s) IV Push every 3 minutes PRN For ANY of the following changes in patient status:  A. RR LESS THAN 10 breaths per minute, B. Oxygen saturation LESS THAN 90%, C. Sedation score of 6  ondansetron Injectable 4 milliGRAM(s) IV Push every 6 hours PRN Nausea  oxyCODONE    IR 5 milliGRAM(s) Oral every 3 hours PRN Moderate to Severe Pain (4-10)  oxyCODONE    IR 5 milliGRAM(s) Oral once PRN Moderate to Severe Pain (4-10)  simethicone 80 milliGRAM(s) Chew every 4 hours PRN Gas

## 2020-06-23 NOTE — PROGRESS NOTE ADULT - SUBJECTIVE AND OBJECTIVE BOX
Patient is a 29y old  Female who presents with a chief complaint of pyelonephritis (2020 08:46) and who had  section for category II tracing yesterday      HPI:  Patient is a 30yo G1 at 37.3 wks presents with left flank pain since yesterday, currently 6/10 on pain scale. Denies VB/LOF/Ctx. Reports positive fetal movement GBS negative on . Patient also has 3x4 burn blister to right bottom, cause for leakage of hot water used as warm compress for back pain.  PNI: :  presenting for pruritus of palms and soles, treated for suspected cholestasis started on ursodiol Bile acids 4.7., also treated for positive UTI with Keflex           admitted to MICU with sepsis from suspected left pyelonephritis and a non-obstructing 1cm left renal stone. Patient was treated with IV CTX and sent home with a PO course of keflex, which she completed two weeks ago. ID was consulted and patient was ruled out for  COVID with 3 negative PCRs and a normal CXR. Hospital course was complicated by an RRT on . Due to persistent tachycardia, patient had CT Angio which showed bilateral patchy opacities and TTE that was normal (EF 65%).      Pob: current pregnancy  Pgyn: denies h/o STIs  PMH: left pyelonephritis, left nephrolithiasis  PSH: none  Meds: iron, PNV, vit D  All: NKDA  Social: endorses h/o smoking socially prior to pregnancy. Denies use of alcohol or drugs during pregnancy. (2020 23:22)      PAST MEDICAL & SURGICAL HISTORY:  Pyelonephritis affecting pregnancy: admit 2020 rx with cephtriaxone  No significant past surgical history      MEDICATIONS  (STANDING):  acetaminophen   Tablet .. 975 milliGRAM(s) Oral <User Schedule>  diphtheria/tetanus/pertussis (acellular) Vaccine (ADAcel) 0.5 milliLiter(s) IntraMuscular once  heparin   Injectable 5000 Unit(s) SubCutaneous every 12 hours  HYDROmorphone PCA (1 mG/mL) 30 milliLiter(s) PCA Continuous PCA Continuous  ibuprofen  Tablet. 600 milliGRAM(s) Oral every 6 hours  ketorolac   Injectable 30 milliGRAM(s) IV Push every 6 hours  meropenem  IVPB 1000 milliGRAM(s) IV Intermittent every 8 hours  oxytocin Infusion 333.333 milliUNIT(s)/Min (1000 mL/Hr) IV Continuous <Continuous>              Vital Signs Last 24 Hrs  T(C): 36.4 (2020 10:07), Max: 36.9 (2020 22:28)  T(F): 97.6 (2020 10:07), Max: 98.5 (2020 22:28)  HR: 74 (2020 10:07) (64 - 84)  BP: 105/62 (2020 10:07) (85/58 - 132/83)  BP(mean): 83 (2020 13:45) (54 - 91)  RR: 18 (2020 10:07) (17 - 23)  SpO2: 100% (2020 10:07) (97% - 100%)    PHYSICAL EXAM:  No complains except mild incisional pain  Abdomen soft and non tender]  She is afebrile and  final culture and sensitivity results are awaited.  Will continue post operative care and antibiotics for pyelonephritis        I&O's Summary    2020 07:01  -  2020 07:00  --------------------------------------------------------  IN: 2225 mL / OUT: 3309 mL / NET: -1084 mL        LABORATORY:                        9.4    12.71 )-----------( 248      ( 2020 06:00 )             28.3     06-22    133<L>  |  102  |  5<L>  ----------------------------<  61<L>  3.6   |  13<L>  |  0.52    Ca    8.8      2020 06:20    TPro  6.3  /  Alb  3.1<L>  /  TBili  0.3  /  DBili  x   /  AST  34<H>  /  ALT  48<H>  /  AlkPhos  229<H>  06-22    PT/INR - ( 2020 14:25 )   PT: 12.0 SEC;   INR: 1.05          PTT - ( 2020 14:25 )  PTT:28.4 SEC

## 2020-06-23 NOTE — DISCHARGE NOTE OB - PATIENT PORTAL LINK FT
You can access the FollowMyHealth Patient Portal offered by [facility] by registering at the following website: [email or web address]. By joining [email or web address] FollowMyHealth portal, you will also be able to view your health information using other applications (apps) compatible with our system.

## 2020-06-23 NOTE — DISCHARGE NOTE OB - MEDICATION SUMMARY - MEDICATIONS TO TAKE
I will START or STAY ON the medications listed below when I get home from the hospital:    ibuprofen 600 mg oral tablet  -- 1 tab(s) by mouth every 6 hours  -- Indication: For Pain    acetaminophen 325 mg oral tablet  -- 3 tab(s) by mouth   -- Indication: For Pain    cephalexin 500 mg oral capsule  -- 1 cap(s) by mouth 4 times a day   -- Finish all this medication unless otherwise directed by prescriber.    -- Indication: For uti    PNV Prenatal oral tablet  -- 1 tab(s) by mouth once a day  -- Indication: For Postpartum    folic acid 1 mg oral tablet  -- 1 tab(s) by mouth once a day  -- Indication: For Postpartum

## 2020-06-23 NOTE — PROGRESS NOTE ADULT - PROBLEM SELECTOR PLAN 1
Neuro: PCA, transition to po pain medication today  CV: Hemodynamically stable. Monitor VS.   Pulm: Saturating well on room air.  GI: c/w regular diet. Persistent, stable, mild transaminitis noted. Patient has been r/o for ICP outpatient, normotensive. Trend stable.  : Voiding spontaneously. Renal sono with mild L hydro and nonobstructing stone.  FEN: Electrolytes: LR@125cc/hr. Bolus as needed for insensible losses.  Heme: DVT ppx w/ SCD's while in bed.   ID: Febrile to 39.5 at 130p >24 hours ago, currently afebrile. Switched to meropenem from ceftriaxone for pyelonephritis. F/u Ucx, Bcx. ID consulted. MICU consult placed with no need for transfer at this time.  Endo: No active issues   Dispo: Continue routine inpatient care    ANNA Montilla PGY-3

## 2020-06-23 NOTE — PROGRESS NOTE ADULT - ASSESSMENT
29y  at 37w5d HD#4 of hospital admission for pyelonephritis 2/2 nephrolithiasis now POD#1 s/p pLTCS for NRFHT. Patient is recovering well.

## 2020-06-23 NOTE — DISCHARGE NOTE OB - CARE PLAN
Principal Discharge DX:	 delivery delivered  Goal:	Recovery  Assessment and plan of treatment:	- Follow up with OB clinic in 1-2 weeks for incision check  - Call doctor with increased bleeding, pain, fever/chills, foul smelling discharge/pain swelling from incision  Secondary Diagnosis:	Pyelonephritis  Goal:	Recovery  Assessment and plan of treatment:	- Continue antibiotics as prescribed  -Follow up with OB in 1-2 weeks for test of cure

## 2020-06-23 NOTE — PROGRESS NOTE ADULT - ASSESSMENT
OB Anesthesia Pain Service Note    Postop Day:  _1_ s/p   C- Section    THERAPY:  [ x ] Spinal morphine:_.2__mg    T(C): 36.4 (06-23-20 @ 05:27), Max: 36.4 (06-23-20 @ 05:27)  HR: 64 (06-23-20 @ 05:27) (64 - 64)  BP: 128/74 (06-23-20 @ 05:27) (128/74 - 128/74)  RR: 18 (06-23-20 @ 05:27) (18 - 18)  SpO2: 100% (06-23-20 @ 05:27) (100% - 100%)    Pain:               [  x ] Controlled on current regieme                   [  ]  Other:    Sedation:	[ x ] Alert	     [  ] Drowsy        [  ] Arousable	[  ] Asleep	     [  ] Unresponsive    Side Effects:	[x  ] None	     [  ] Nausea        [  ] Pruritus            [  ] Weakness     [  ] Numbness            [  ] Headache      ASSESSMENT/ PLAN:    [   ] Side effects resolving      [   ] Patient made aware of PRN meds available     [ x] Discontinue as per 24 hour protocol orders  & switch to PRN pain medications  as per OB service     [   ] Continue     At 7:28 nurse indicated no issues when saw patient was sleeping. Returned at 07:46 am to find patient awake and says no issues OB Anesthesia Pain Service Note    Postop Day:  _1_ s/p   C- Section    THERAPY:  IV PCA at .2 mg with 6 minute lockout with 4 mg 4 hour limit. Total of 2.4 mg given.    T(C): 36.4 (06-23-20 @ 05:27), Max: 36.4 (06-23-20 @ 05:27)  HR: 64 (06-23-20 @ 05:27) (64 - 64)  BP: 128/74 (06-23-20 @ 05:27) (128/74 - 128/74)  RR: 18 (06-23-20 @ 05:27) (18 - 18)  SpO2: 100% (06-23-20 @ 05:27) (100% - 100%)    Pain:               [  x ] Controlled on current regieme                  [  ]  Other:    Sedation:	[ x ] Alert	     [  ] Drowsy        [  ] Arousable	[  ] Asleep	     [  ] Unresponsive    Side Effects:	[x  ] None	     [  ] Nausea        [  ] Pruritus            [  ] Weakness     [  ] Numbness            [  ] Headache      ASSESSMENT/ PLAN:    [   ] Side effects resolving      [   ] Patient made aware of PRN meds available     [ x] Discontinue as per 24 hour protocol orders  & switch to PRN pain medications  as per OB service     [   ] Continue     At 7:28 nurse indicated no issues when saw patient was sleeping. Returned at 07:46 am to find patient awake and says no issues. Team wants PCA off at 11 am.

## 2020-06-23 NOTE — DISCHARGE NOTE OB - HOSPITAL COURSE
30 yo  admitted at 37.3 wks with pyelonephritis, presents with left flank pain, fever, leukocytosis, and urine culture with GNR. Pt then found to have Cat II FHT on AM NST on 2020 and was emergently delivered via C/S for prolonged deceleration at 37.5 weeks. BCX NGTD, USG with mild L hydro, L nonobstructing stone. ID consult: Culture now shows E coli R unasyn, amp, otherwise S  - DC Susie, start Cefazolin 1g q 8- When DC planning, anticipate would send on PO B lactam. 30 yo  admitted at 37.3 wks with pyelonephritis, presents with left flank pain, fever, leukocytosis, and urine culture with GNR. Patient initially administered ceftriazone () the transitioned to meropenem (). Pt then found to have Cat II FHT on AM NST on 2020 and was emergently delivered via C/S for prolonged deceleration at 37.5 weeks. BCX NGTD, USG with mild L hydro, L nonobstructing stone. ID consult: Culture now shows E coli R unasyn, amp, otherwise S. Meropenem discontinued per ID and cefazolin initiated on . 30 yo  admitted at 37.3 wks with pyelonephritis, presents with left flank pain, fever, leukocytosis, and urine culture with GNR. Patient initially administered ceftriazone () the transitioned to meropenem (). Pt then found to have Cat II FHT on AM NST on 2020 and was emergently delivered via C/S for prolonged deceleration at 37.5 weeks. BCX NGTD, USG with mild L hydro, L nonobstructing stone. ID consult: Culture now shows E coli R unasyn, amp, otherwise S. Meropenem discontinued per ID and cefazolin initiated on . Per ID When DC planning can send out on Keflex 500mg q 6 through 20 - Follow up in ID clinic -[phone]. Pt VS stable, has been afebrile, WBC trend 14->13->12->8->7.2. Pt denies pain, lochia WNL. Pt doing well and is stable for discharge home on Keflex PO QID for 7 more days and for follow up in OB clinic next Friday 28 yo  admitted at 37.3 wks with pyelonephritis, presents with left flank pain, fever, leukocytosis, and urine culture with GNR. Patient initially administered ceftriazone () the transitioned to meropenem (). Pt then found to have Cat II FHT on AM NST on 2020 and was emergently delivered via C/S for prolonged deceleration at 37.5 weeks. BCX NGTD, USG with mild L hydro, L nonobstructing stone. ID consult: Culture now shows E coli R unasyn, amp, otherwise S. Meropenem discontinued per ID and cefazolin initiated on . Per ID When DC planning can send out on Keflex 500mg q 6 through 20 - Follow up in ID clinic -[phone]. Pt VS stable, has been afebrile, WBC trend 14->13->12->8->7.2. Pt received depo provera for birth control. Pt denies pain, lochia WNL. Pt doing well and is stable for discharge home on Keflex PO QID for 7 more days and for follow up in OB clinic next Friday

## 2020-06-23 NOTE — DISCHARGE NOTE OB - MEDICATION SUMMARY - MEDICATIONS TO STOP TAKING
I will STOP taking the medications listed below when I get home from the hospital:    ursodiol 300 mg oral capsule  -- 1 cap(s) by mouth 2 times a day   -- It is very important that you take or use this exactly as directed.  Do not skip doses or discontinue unless directed by your doctor.  Obtain medical advice before taking any non-prescription drugs as some may affect the action of this medication.    Macrobid 100 mg oral capsule  -- 1 cap(s) by mouth once a day   -- Finish all this medication unless otherwise directed by prescriber.  May discolor urine or feces.  Take with food or milk.

## 2020-06-23 NOTE — DISCHARGE NOTE OB - COMMUNITY RESOURCE NAME:
Patient/spouse will call to schedule an appointment for an  Incision check for 2 weeks after delivery date with [facility] [phone]  and will call to schedule baby's first visit appointment at  [facility]: Division of General Pediatrics [address] [place] [address], [facility] [address] [place] [address]([phone]) so that baby is evaluated by pediatrician 1 to 2 days after hospital discharge.

## 2020-06-23 NOTE — DISCHARGE NOTE OB - PLAN OF CARE
Recovery - Follow up with OB clinic in 1-2 weeks for incision check  - Call doctor with increased bleeding, pain, fever/chills, foul smelling discharge/pain swelling from incision - Continue antibiotics as prescribed  -Follow up with OB in 1-2 weeks for test of cure

## 2020-06-23 NOTE — DISCHARGE NOTE OB - PROVIDER TOKENS
FREE:[LAST:[[facility]],PHONE:[phone]],FAX:[(   [phone]    -],ADDRESS:[[address] [place] [address]]]

## 2020-06-23 NOTE — PROGRESS NOTE ADULT - SUBJECTIVE AND OBJECTIVE BOX
CC: F/U for UTI    Saw/spoke to patient. No fevers, no chills. No new complaints. Feels well. Baby doing well.    Allergies  No Known Allergies    ANTIMICROBIALS:  meropenem  IVPB 1000 every 8 hours    PE:    Vital Signs Last 24 Hrs  T(C): 36.4 (23 Jun 2020 10:07), Max: 36.9 (22 Jun 2020 22:28)  T(F): 97.6 (23 Jun 2020 10:07), Max: 98.5 (22 Jun 2020 22:28)  HR: 74 (23 Jun 2020 10:07) (64 - 81)  BP: 105/62 (23 Jun 2020 10:07) (98/41 - 132/83)  BP(mean): 83 (22 Jun 2020 13:45) (54 - 83)  RR: 18 (23 Jun 2020 10:07) (17 - 23)  SpO2: 100% (23 Jun 2020 10:07) (97% - 100%)    Gen: AOx3, NAD, non-toxic  CV: S1+S2 normal, nontachycardic  Resp: Clear bilat, no resp distress, no crackles/wheezes  Abd: Soft, nontender, +BS  Ext: No LE edema, no wounds    LABS:                        9.4    12.71 )-----------( 248      ( 23 Jun 2020 06:00 )             28.3     06-22    133<L>  |  102  |  5<L>  ----------------------------<  61<L>  3.6   |  13<L>  |  0.52    Ca    8.8      22 Jun 2020 06:20    TPro  6.3  /  Alb  3.1<L>  /  TBili  0.3  /  DBili  x   /  AST  34<H>  /  ALT  48<H>  /  AlkPhos  229<H>  06-22    MICROBIOLOGY:    .Blood Blood-Peripheral  06-21-20   No growth to date.    .Urine Clean Catch (Midstream)  06-21-20   No growth     .Blood Blood  06-21-20   No growth to date.     .Urine Clean Catch (Midstream)  06-21-20   >100,000 CFU/ml Escherichia coli  --  Escherichia coli    .Urine Clean Catch (Midstream)  06-02-20   >100,000 CFU/ml Escherichia coli  --  Escherichia coli    (otherwise reviewed)    RADIOLOGY:    6/21 US:    FINDINGS:    Right kidney: 11.4 cm. No hydronephrosis, mass or renal calculi.    Left kidney: 12.1 cm. Redemonstration of a nonobstructing 8 mm lower pole renal calculus. Mild hydronephrosis.    Urinary bladder: Collapsed post void urinary bladder.    Miscellaneous: Hepatic steatosis.    IMPRESSION:     Mild left hydronephrosis. Nonobstructing left lower pole renal calculus.

## 2020-06-24 DIAGNOSIS — N12 TUBULO-INTERSTITIAL NEPHRITIS, NOT SPECIFIED AS ACUTE OR CHRONIC: ICD-10-CM

## 2020-06-24 LAB
ALBUMIN SERPL ELPH-MCNC: 2.3 G/DL — LOW (ref 3.3–5)
ALP SERPL-CCNC: 154 U/L — HIGH (ref 40–120)
ALT FLD-CCNC: 27 U/L — SIGNIFICANT CHANGE UP (ref 4–33)
ANION GAP SERPL CALC-SCNC: 11 MMO/L — SIGNIFICANT CHANGE UP (ref 7–14)
AST SERPL-CCNC: 25 U/L — SIGNIFICANT CHANGE UP (ref 4–32)
BASOPHILS # BLD AUTO: 0.03 K/UL — SIGNIFICANT CHANGE UP (ref 0–0.2)
BASOPHILS # BLD AUTO: 0.04 K/UL — SIGNIFICANT CHANGE UP (ref 0–0.2)
BASOPHILS NFR BLD AUTO: 0.4 % — SIGNIFICANT CHANGE UP (ref 0–2)
BASOPHILS NFR BLD AUTO: 0.5 % — SIGNIFICANT CHANGE UP (ref 0–2)
BILIRUB SERPL-MCNC: < 0.2 MG/DL — LOW (ref 0.2–1.2)
BUN SERPL-MCNC: 16 MG/DL — SIGNIFICANT CHANGE UP (ref 7–23)
CALCIUM SERPL-MCNC: 8.5 MG/DL — SIGNIFICANT CHANGE UP (ref 8.4–10.5)
CHLORIDE SERPL-SCNC: 106 MMOL/L — SIGNIFICANT CHANGE UP (ref 98–107)
CO2 SERPL-SCNC: 22 MMOL/L — SIGNIFICANT CHANGE UP (ref 22–31)
CREAT SERPL-MCNC: 0.65 MG/DL — SIGNIFICANT CHANGE UP (ref 0.5–1.3)
EOSINOPHIL # BLD AUTO: 0.09 K/UL — SIGNIFICANT CHANGE UP (ref 0–0.5)
EOSINOPHIL # BLD AUTO: 0.15 K/UL — SIGNIFICANT CHANGE UP (ref 0–0.5)
EOSINOPHIL NFR BLD AUTO: 1.2 % — SIGNIFICANT CHANGE UP (ref 0–6)
EOSINOPHIL NFR BLD AUTO: 1.7 % — SIGNIFICANT CHANGE UP (ref 0–6)
GLUCOSE SERPL-MCNC: 73 MG/DL — SIGNIFICANT CHANGE UP (ref 70–99)
HCT VFR BLD CALC: 23.1 % — LOW (ref 34.5–45)
HCT VFR BLD CALC: 26.4 % — LOW (ref 34.5–45)
HGB BLD-MCNC: 7.5 G/DL — LOW (ref 11.5–15.5)
HGB BLD-MCNC: 8.6 G/DL — LOW (ref 11.5–15.5)
IMM GRANULOCYTES NFR BLD AUTO: 0.5 % — SIGNIFICANT CHANGE UP (ref 0–1.5)
IMM GRANULOCYTES NFR BLD AUTO: 0.9 % — SIGNIFICANT CHANGE UP (ref 0–1.5)
LYMPHOCYTES # BLD AUTO: 1.66 K/UL — SIGNIFICANT CHANGE UP (ref 1–3.3)
LYMPHOCYTES # BLD AUTO: 1.91 K/UL — SIGNIFICANT CHANGE UP (ref 1–3.3)
LYMPHOCYTES # BLD AUTO: 22.1 % — SIGNIFICANT CHANGE UP (ref 13–44)
LYMPHOCYTES # BLD AUTO: 22.8 % — SIGNIFICANT CHANGE UP (ref 13–44)
MCHC RBC-ENTMCNC: 30.2 PG — SIGNIFICANT CHANGE UP (ref 27–34)
MCHC RBC-ENTMCNC: 30.3 PG — SIGNIFICANT CHANGE UP (ref 27–34)
MCHC RBC-ENTMCNC: 32.5 % — SIGNIFICANT CHANGE UP (ref 32–36)
MCHC RBC-ENTMCNC: 32.6 % — SIGNIFICANT CHANGE UP (ref 32–36)
MCV RBC AUTO: 93 FL — SIGNIFICANT CHANGE UP (ref 80–100)
MCV RBC AUTO: 93.1 FL — SIGNIFICANT CHANGE UP (ref 80–100)
MONOCYTES # BLD AUTO: 0.22 K/UL — SIGNIFICANT CHANGE UP (ref 0–0.9)
MONOCYTES # BLD AUTO: 0.39 K/UL — SIGNIFICANT CHANGE UP (ref 0–0.9)
MONOCYTES NFR BLD AUTO: 3 % — SIGNIFICANT CHANGE UP (ref 2–14)
MONOCYTES NFR BLD AUTO: 4.5 % — SIGNIFICANT CHANGE UP (ref 2–14)
NEUTROPHILS # BLD AUTO: 5.25 K/UL — SIGNIFICANT CHANGE UP (ref 1.8–7.4)
NEUTROPHILS # BLD AUTO: 6.08 K/UL — SIGNIFICANT CHANGE UP (ref 1.8–7.4)
NEUTROPHILS NFR BLD AUTO: 70.3 % — SIGNIFICANT CHANGE UP (ref 43–77)
NEUTROPHILS NFR BLD AUTO: 72.1 % — SIGNIFICANT CHANGE UP (ref 43–77)
NRBC # FLD: 0 K/UL — SIGNIFICANT CHANGE UP (ref 0–0)
NRBC # FLD: 0 K/UL — SIGNIFICANT CHANGE UP (ref 0–0)
PLATELET # BLD AUTO: 269 K/UL — SIGNIFICANT CHANGE UP (ref 150–400)
PLATELET # BLD AUTO: 276 K/UL — SIGNIFICANT CHANGE UP (ref 150–400)
PMV BLD: 10.6 FL — SIGNIFICANT CHANGE UP (ref 7–13)
PMV BLD: 11.1 FL — SIGNIFICANT CHANGE UP (ref 7–13)
POTASSIUM SERPL-MCNC: 4 MMOL/L — SIGNIFICANT CHANGE UP (ref 3.5–5.3)
POTASSIUM SERPL-SCNC: 4 MMOL/L — SIGNIFICANT CHANGE UP (ref 3.5–5.3)
PROT SERPL-MCNC: 5.1 G/DL — LOW (ref 6–8.3)
RBC # BLD: 2.48 M/UL — LOW (ref 3.8–5.2)
RBC # BLD: 2.84 M/UL — LOW (ref 3.8–5.2)
RBC # FLD: 13.7 % — SIGNIFICANT CHANGE UP (ref 10.3–14.5)
RBC # FLD: 13.7 % — SIGNIFICANT CHANGE UP (ref 10.3–14.5)
SODIUM SERPL-SCNC: 139 MMOL/L — SIGNIFICANT CHANGE UP (ref 135–145)
WBC # BLD: 7.29 K/UL — SIGNIFICANT CHANGE UP (ref 3.8–10.5)
WBC # BLD: 8.65 K/UL — SIGNIFICANT CHANGE UP (ref 3.8–10.5)
WBC # FLD AUTO: 7.29 K/UL — SIGNIFICANT CHANGE UP (ref 3.8–10.5)
WBC # FLD AUTO: 8.65 K/UL — SIGNIFICANT CHANGE UP (ref 3.8–10.5)

## 2020-06-24 RX ORDER — IBUPROFEN 200 MG
600 TABLET ORAL EVERY 6 HOURS
Refills: 0 | Status: DISCONTINUED | OUTPATIENT
Start: 2020-06-24 | End: 2020-06-25

## 2020-06-24 RX ORDER — BACITRACIN ZINC 500 UNIT/G
1 OINTMENT IN PACKET (EA) TOPICAL EVERY 12 HOURS
Refills: 0 | Status: DISCONTINUED | OUTPATIENT
Start: 2020-06-24 | End: 2020-06-25

## 2020-06-24 RX ADMIN — Medication 100 MILLIGRAM(S): at 08:48

## 2020-06-24 RX ADMIN — Medication 975 MILLIGRAM(S): at 11:25

## 2020-06-24 RX ADMIN — Medication 100 MILLIGRAM(S): at 17:30

## 2020-06-24 RX ADMIN — HEPARIN SODIUM 5000 UNIT(S): 5000 INJECTION INTRAVENOUS; SUBCUTANEOUS at 06:05

## 2020-06-24 RX ADMIN — HEPARIN SODIUM 5000 UNIT(S): 5000 INJECTION INTRAVENOUS; SUBCUTANEOUS at 17:29

## 2020-06-24 RX ADMIN — Medication 975 MILLIGRAM(S): at 17:30

## 2020-06-24 RX ADMIN — Medication 100 MILLIGRAM(S): at 00:10

## 2020-06-24 RX ADMIN — Medication 30 MILLIGRAM(S): at 08:48

## 2020-06-24 RX ADMIN — Medication 975 MILLIGRAM(S): at 00:10

## 2020-06-24 RX ADMIN — Medication 975 MILLIGRAM(S): at 06:04

## 2020-06-24 RX ADMIN — Medication 600 MILLIGRAM(S): at 15:28

## 2020-06-24 NOTE — PROGRESS NOTE ADULT - SUBJECTIVE AND OBJECTIVE BOX
OB Progress Note: LTCS, POD#2    S: 28yo POD#2 s/p pLTCS for NRFHT. Pain is well controlled. She is tolerating a regular diet and passing flatus. She is voiding spontaneously, and ambulating without difficulty. Denies CP/SOB. Denies lightheadedness/dizziness. Denies N/V.    O:  Vitals:  Vital Signs Last 24 Hrs  T(C): 36.7 (23 Jun 2020 21:57), Max: 36.7 (23 Jun 2020 21:57)  T(F): 98 (23 Jun 2020 21:57), Max: 98 (23 Jun 2020 21:57)  HR: 74 (23 Jun 2020 21:57) (64 - 74)  BP: 98/67 (23 Jun 2020 21:57) (91/59 - 128/74)  BP(mean): --  RR: 16 (23 Jun 2020 21:57) (16 - 18)  SpO2: 98% (23 Jun 2020 21:57) (98% - 100%)    MEDICATIONS  (STANDING):  acetaminophen   Tablet .. 975 milliGRAM(s) Oral <User Schedule>  ceFAZolin   IVPB 1000 milliGRAM(s) IV Intermittent every 8 hours  diphtheria/tetanus/pertussis (acellular) Vaccine (ADAcel) 0.5 milliLiter(s) IntraMuscular once  heparin   Injectable 5000 Unit(s) SubCutaneous every 12 hours  ibuprofen  Tablet. 600 milliGRAM(s) Oral every 6 hours  ketorolac   Injectable 30 milliGRAM(s) IV Push every 6 hours  oxytocin Infusion 333.333 milliUNIT(s)/Min (1000 mL/Hr) IV Continuous <Continuous>      MEDICATIONS  (PRN):  diphenhydrAMINE 25 milliGRAM(s) Oral every 6 hours PRN Itching  lanolin Ointment 1 Application(s) Topical every 6 hours PRN Sore Nipples  magnesium hydroxide Suspension 30 milliLiter(s) Oral two times a day PRN Constipation  naloxone Injectable 0.1 milliGRAM(s) IV Push every 3 minutes PRN For ANY of the following changes in patient status:  A. RR LESS THAN 10 breaths per minute, B. Oxygen saturation LESS THAN 90%, C. Sedation score of 6  ondansetron Injectable 4 milliGRAM(s) IV Push every 6 hours PRN Nausea  oxyCODONE    IR 5 milliGRAM(s) Oral every 3 hours PRN Moderate to Severe Pain (4-10)  oxyCODONE    IR 5 milliGRAM(s) Oral once PRN Moderate to Severe Pain (4-10)  simethicone 80 milliGRAM(s) Chew every 4 hours PRN Gas      Labs:  Blood type: A Positive  Rubella IgG: Positive (05-05 @ 11:15)  RPR: Negative                          9.4<L>   12.71<H> >-----------< 248    ( 06-23 @ 06:00 )             28.3<L>                        10.4<L>   13.09<H> >-----------< 222    ( 06-22 @ 06:20 )             32.1<L>                        10.8<L>   13.77<H> >-----------< 219    ( 06-21 @ 14:25 )             33.3<L>                        10.7<L>   14.76<H> >-----------< 220    ( 06-21 @ 07:35 )             31.6<L>    06-22-20 @ 06:20      133<L>  |  102  |  5<L>  ----------------------------<  61<L>  3.6   |  13<L>  |  0.52    06-21-20 @ 14:25      132<L>  |  98  |  4<L>  ----------------------------<  80  3.6   |  20<L>  |  0.55    06-21-20 @ 07:35      135  |  102  |  6<L>  ----------------------------<  89  3.5   |  19<L>  |  0.54        Ca    8.8      22 Jun 2020 06:20  Ca    8.8      21 Jun 2020 14:25  Ca    8.8      21 Jun 2020 07:35    TPro  6.3  /  Alb  3.1<L>  /  TBili  0.3  /  DBili  x   /  AST  34<H>  /  ALT  48<H>  /  AlkPhos  229<H>  06-22-20 @ 06:20  TPro  6.5  /  Alb  3.2<L>  /  TBili  0.3  /  DBili  x   /  AST  40<H>  /  ALT  56<H>  /  AlkPhos  248<H>  06-21-20 @ 14:25  TPro  6.2  /  Alb  3.1<L>  /  TBili  0.3  /  DBili  x   /  AST  38<H>  /  ALT  54<H>  /  AlkPhos  242<H>  06-21-20 @ 07:35          PE:  General: NAD  Abdomen: Soft, appropriately tender, incision c/d/i.  Extremities: No erythema, no pitting edema

## 2020-06-24 NOTE — PROGRESS NOTE ADULT - PROBLEM SELECTOR PLAN 1
-ID following, continue Cefazolin for pyelonephritis. Appreciate recs  - F/u final urine and blood cultures  	   - Continue regular diet.  - Increase ambulation.  - Continue motrin, tylenol, oxycodone PRN for pain control.    vivek pgy3

## 2020-06-24 NOTE — PROGRESS NOTE ADULT - ASSESSMENT
A/P: 30yo POD#2 s/p pLTCS for NRFHT. Prior to delivery pt admitted to antepartum for pylenonephritis. Pt s/p Ceftriazone and Meropenum, currently on Cefazolin.  Patient is stable and doing well post-operatively.

## 2020-06-24 NOTE — PROGRESS NOTE ADULT - SUBJECTIVE AND OBJECTIVE BOX
S: Patient doing well. No complaints. Minimal lochia. Pain controlled.    O: Vital Signs Last 24 Hrs  T(C): 36.9 (24 Jun 2020 14:15), Max: 36.9 (24 Jun 2020 14:15)  T(F): 98.5 (24 Jun 2020 14:15), Max: 98.5 (24 Jun 2020 14:15)  HR: 64 (24 Jun 2020 14:15) (56 - 74)  BP: 114/57 (24 Jun 2020 14:15) (91/59 - 114/57)  BP(mean): --  RR: 18 (24 Jun 2020 14:15) (16 - 18)  SpO2: 100% (24 Jun 2020 14:15) (98% - 100%)    Gen: NAD  Abd: soft, Nontender, Nondistended, fundus firm  Ext: no tendern, mild edema, Incision healing well    Labs:                        8.6    7.29  )-----------( 276      ( 24 Jun 2020 11:54 )             26.4       A: 29y POD#2 s/p c/s for category II tracing  doing well.    Plan:  Routine postpartum care  Cont antibiotics and transition to oral antibiotcs  Will check with ID about our choice of antibiotics  Encouraged out of bed  Regular diet  For discharge home tomorrow

## 2020-06-24 NOTE — CHART NOTE - NSCHARTNOTEFT_GEN_A_CORE
CBC reviewed with . RN reports patient had dizziness yesterday. Denies symptoms of hypovolemia at this time.     Vital Signs Last 24 Hrs  T(C): 36.4 (24 Jun 2020 06:02), Max: 36.7 (23 Jun 2020 21:57)  T(F): 97.6 (24 Jun 2020 06:02), Max: 98 (23 Jun 2020 21:57)  HR: 56 (24 Jun 2020 06:02) (56 - 74)  BP: 96/51 (24 Jun 2020 06:02) (91/59 - 105/62)  RR: 16 (24 Jun 2020 06:02) (16 - 18)  SpO2: 99% (24 Jun 2020 06:02) (98% - 100%)                          7.5    8.65  )-----------( 269      ( 24 Jun 2020 06:20 )             23.1       Patient for repeat CBC at noon.  will by the bedside to assess patient.

## 2020-06-25 VITALS
HEART RATE: 74 BPM | OXYGEN SATURATION: 100 % | SYSTOLIC BLOOD PRESSURE: 120 MMHG | RESPIRATION RATE: 16 BRPM | DIASTOLIC BLOOD PRESSURE: 69 MMHG | TEMPERATURE: 99 F

## 2020-06-25 PROCEDURE — 99232 SBSQ HOSP IP/OBS MODERATE 35: CPT

## 2020-06-25 RX ORDER — IBUPROFEN 200 MG
1 TABLET ORAL
Qty: 0 | Refills: 0 | DISCHARGE
Start: 2020-06-25

## 2020-06-25 RX ORDER — CEPHALEXIN 500 MG
1 CAPSULE ORAL
Qty: 28 | Refills: 0
Start: 2020-06-25 | End: 2020-07-01

## 2020-06-25 RX ORDER — MEDROXYPROGESTERONE ACETATE 150 MG/ML
150 INJECTION, SUSPENSION, EXTENDED RELEASE INTRAMUSCULAR ONCE
Refills: 0 | Status: COMPLETED | OUTPATIENT
Start: 2020-06-25 | End: 2020-06-25

## 2020-06-25 RX ORDER — BENZOYL PEROXIDE MICRONIZED 5.8 %
1 TOWELETTE (EA) TOPICAL
Qty: 0 | Refills: 0 | DISCHARGE

## 2020-06-25 RX ORDER — ERGOCALCIFEROL 1.25 MG/1
1 CAPSULE ORAL
Qty: 0 | Refills: 0 | DISCHARGE

## 2020-06-25 RX ORDER — ACETAMINOPHEN 500 MG
3 TABLET ORAL
Qty: 0 | Refills: 0 | DISCHARGE
Start: 2020-06-25

## 2020-06-25 RX ADMIN — HEPARIN SODIUM 5000 UNIT(S): 5000 INJECTION INTRAVENOUS; SUBCUTANEOUS at 05:18

## 2020-06-25 RX ADMIN — Medication 1 APPLICATION(S): at 05:19

## 2020-06-25 RX ADMIN — Medication 100 MILLIGRAM(S): at 01:03

## 2020-06-25 RX ADMIN — MEDROXYPROGESTERONE ACETATE 150 MILLIGRAM(S): 150 INJECTION, SUSPENSION, EXTENDED RELEASE INTRAMUSCULAR at 14:32

## 2020-06-25 RX ADMIN — Medication 100 MILLIGRAM(S): at 08:51

## 2020-06-25 RX ADMIN — MAGNESIUM HYDROXIDE 30 MILLILITER(S): 400 TABLET, CHEWABLE ORAL at 08:51

## 2020-06-25 RX ADMIN — Medication 975 MILLIGRAM(S): at 05:18

## 2020-06-25 NOTE — PROGRESS NOTE ADULT - SUBJECTIVE AND OBJECTIVE BOX
OB Progress Note:  Delivery, POD#3    S: 28yo POD#3 s/p pLTCS . Her pain is well controlled. She is tolerating a regular diet and passing flatus. Denies N/V. Denies CP/SOB/lightheadedness/dizziness, fevers, chills.   She is ambulating without difficulty.   Voiding spontaneously.     O:   Vital Signs Last 24 Hrs  T(C): 37.1 (2020 10:28), Max: 37.1 (2020 10:28)  T(F): 98.7 (2020 10:28), Max: 98.7 (2020 10:28)  HR: 65 (2020 10:28) (51 - 65)  BP: 117/74 (2020 10:28) (91/51 - 118/74)  BP(mean): --  RR: 17 (2020 10:28) (16 - 18)  SpO2: 99% (2020 10:28) (98% - 100%)      PE:  General: NAD  Abdomen: Mildly distended, appropriately tender, incision c/d/i, fundus firm.  Extremities: NTTP, no erythema, no pitting edema  Pelvic: Minimal lochia    Labs:  Blood type: A Positive  Rubella IgG: Positive ( @ 11:15)  RPR: Negative                          8.6<L>   7.29 >-----------< 276    (  @ 11:54 )             26.4<L>                        7.5<L>   8.65 >-----------< 269    (  @ 06:20 )             23.1<L>                        9.4<L>   12.71<H> >-----------< 248    (  @ 06:00 )             28.3<L>    - @ 06:20      139  |  106  |  16  ----------------------------<  73  4.0   |  22  |  0.65        Ca    8.5      2020 06:20    TPro  5.1<L>  /  Alb  2.3<L>  /  TBili  < 0.2<L>  /  DBili  x   /  AST  25  /  ALT  27  /  AlkPhos  154<H>  24-20 @ 06:20          A/P: 28yo POD#3 s/p LTCS.  Patient is stable and doing well post-operatively.    - Continue regular diet.  - Increase ambulation.  - Continue motrin, tylenol, oxycodone PRN for pain control.   -Discharge planning    Tasha Bradshaw PGY-2

## 2020-06-25 NOTE — PROGRESS NOTE ADULT - ASSESSMENT
28 yo F G1 at 37.3 wks presents with left flank pain since yesterday, currently 6/10 on pain scale  Fever, leukocytosis  Positive UA, UCX with GNR  BCX NGTD  USG with mild L hydro, L nonobstructing stone  S/p C section  Doing well in post op period, symptoms from UTI resolved  Low suspicion for chorio as primary source  Overall,  1) Pyelonephritis  - Culture now shows E coli R unasyn, amp, otherwise S  - Continue Cefazolin 1g q 8 while inpatient  - When DC planning can send out on Keflex 500mg q 6 through 7/1/20  - Follow up in ID clinic PRN with me, [phone]  2) Fever  - Likely due to pyelo  - Follow up BCXs  - Monitor for further fevers  3) Leukocytosis  - Trend to normal    John Hill MD  Pager [phone]  After 5pm and on weekends call [phone]

## 2020-06-25 NOTE — PROGRESS NOTE ADULT - SUBJECTIVE AND OBJECTIVE BOX
CC: F/U for UTI    Saw/spoke to patient. No fevers, no chills. No new complaints. Unchanged. Improved.    Allergies  No Known Allergies    ANTIMICROBIALS:  ceFAZolin   IVPB 1000 every 8 hours    PE:    Vital Signs Last 24 Hrs  T(C): 37.1 (25 Jun 2020 10:28), Max: 37.1 (25 Jun 2020 10:28)  T(F): 98.7 (25 Jun 2020 10:28), Max: 98.7 (25 Jun 2020 10:28)  HR: 65 (25 Jun 2020 10:28) (51 - 65)  BP: 117/74 (25 Jun 2020 10:28) (91/51 - 118/74)  RR: 17 (25 Jun 2020 10:28) (16 - 18)  SpO2: 99% (25 Jun 2020 10:28) (98% - 100%)    Gen: AOx3, NAD, non-toxic  CV: S1+S2 normal, nontachycardic  Resp: Clear bilat, no resp distress, no crackles/wheezes  Abd: Soft, nontender, +BS  Ext: No LE edema, no wounds    LABS:                        8.6    7.29  )-----------( 276      ( 24 Jun 2020 11:54 )             26.4     06-24    139  |  106  |  16  ----------------------------<  73  4.0   |  22  |  0.65    Ca    8.5      24 Jun 2020 06:20    TPro  5.1<L>  /  Alb  2.3<L>  /  TBili  < 0.2<L>  /  DBili  x   /  AST  25  /  ALT  27  /  AlkPhos  154<H>  06-24    MICROBIOLOGY:    .Surgical Swab placenta maternal side  06-22-20   No growth to date.    .Surgical Swab placenta fetal side  06-22-20   No growth to date.     .Urine Clean Catch (Midstream)  06-21-20   >100,000 CFU/ml Escherichia coli  --  Escherichia coli    .Urine Clean Catch (Midstream)  06-02-20   >100,000 CFU/ml Escherichia coli  --  Escherichia coli    (otherwise reviewed)    RADIOLOGY:    6/21 US:    FINDINGS:    Right kidney: 11.4 cm. No hydronephrosis, mass or renal calculi.    Left kidney: 12.1 cm. Redemonstration of a nonobstructing 8 mm lower pole renal calculus. Mild hydronephrosis.    Urinary bladder: Collapsed post void urinary bladder.    Miscellaneous: Hepatic steatosis.    IMPRESSION:     Mild left hydronephrosis. Nonobstructing left lower pole renal calculus.

## 2020-06-25 NOTE — PROGRESS NOTE ADULT - PROBLEM SELECTOR PLAN 1
-ID following, continue Cefazolin for pyelonephritis for total of 5-6days IV abx. Appreciate recs for PO abx on discharge  -UCx (6/21) final +E.coli. (see result for sensitivities). BCx (6/21) NGTD prelim result.  - Rpt UCx NG final (6/21) Rpt prelim BCx (6/21) NGTD. Prelim placental Cx (6/22) NGTD.  - Continue regular diet.  - Increase ambulation.  - Continue motrin, tylenol, oxycodone PRN for pain control.  -Discharge planning  -For depo provera for postpartum birth control    Tasha Bradshaw, PGY2

## 2020-06-25 NOTE — PROGRESS NOTE ADULT - ASSESSMENT
A/P: 28yo POD#2 s/p pLTCS for NRFHT. Prior to delivery pt admitted to antepartum for pylenonephritis. Pt s/p Ceftriazone and Meropenum, currently on Cefazolin.  Patient is stable and doing well post-operatively.

## 2020-06-25 NOTE — PROGRESS NOTE ADULT - ATTENDING COMMENTS
Patient seen and examined.  No complains  MAy be sent home on oral antibiotics  Agree with above assessment

## 2020-06-26 LAB
CULTURE RESULTS: SIGNIFICANT CHANGE UP
SPECIMEN SOURCE: SIGNIFICANT CHANGE UP

## 2020-07-01 ENCOUNTER — EMERGENCY (EMERGENCY)
Facility: HOSPITAL | Age: 30
LOS: 1 days | Discharge: HOME | End: 2020-07-01
Attending: EMERGENCY MEDICINE | Admitting: EMERGENCY MEDICINE
Payer: MEDICAID

## 2020-07-01 ENCOUNTER — APPOINTMENT (OUTPATIENT)
Dept: OBGYN | Facility: HOSPITAL | Age: 30
End: 2020-07-01

## 2020-07-01 ENCOUNTER — NON-APPOINTMENT (OUTPATIENT)
Age: 30
End: 2020-07-01

## 2020-07-01 ENCOUNTER — OUTPATIENT (OUTPATIENT)
Dept: OUTPATIENT SERVICES | Facility: HOSPITAL | Age: 30
LOS: 1 days | End: 2020-07-01

## 2020-07-01 VITALS
HEART RATE: 63 BPM | TEMPERATURE: 99 F | RESPIRATION RATE: 16 BRPM | DIASTOLIC BLOOD PRESSURE: 79 MMHG | SYSTOLIC BLOOD PRESSURE: 123 MMHG | OXYGEN SATURATION: 100 %

## 2020-07-01 DIAGNOSIS — Z98.891 HISTORY OF UTERINE SCAR FROM PREVIOUS SURGERY: Chronic | ICD-10-CM

## 2020-07-01 DIAGNOSIS — O26.619 LIVER AND BILIARY TRACT DISORDERS IN PREGNANCY, UNSPECIFIED TRIMESTER: ICD-10-CM

## 2020-07-01 DIAGNOSIS — K83.1 LIVER AND BILIARY TRACT DISORDERS IN PREGNANCY, UNSPECIFIED TRIMESTER: ICD-10-CM

## 2020-07-01 DIAGNOSIS — Z34.92 ENCOUNTER FOR SUPERVISION OF NORMAL PREGNANCY, UNSPECIFIED, SECOND TRIMESTER: ICD-10-CM

## 2020-07-01 DIAGNOSIS — O99.019 ANEMIA COMPLICATING PREGNANCY, UNSPECIFIED TRIMESTER: ICD-10-CM

## 2020-07-01 LAB
ALBUMIN SERPL ELPH-MCNC: 3.5 G/DL — SIGNIFICANT CHANGE UP (ref 3.3–5)
ALP SERPL-CCNC: 159 U/L — HIGH (ref 40–120)
ALT FLD-CCNC: 35 U/L — HIGH (ref 4–33)
ANION GAP SERPL CALC-SCNC: 14 MMO/L — SIGNIFICANT CHANGE UP (ref 7–14)
APPEARANCE UR: CLEAR — SIGNIFICANT CHANGE UP
AST SERPL-CCNC: 29 U/L — SIGNIFICANT CHANGE UP (ref 4–32)
BACTERIA # UR AUTO: NEGATIVE — SIGNIFICANT CHANGE UP
BASOPHILS # BLD AUTO: 0.05 K/UL — SIGNIFICANT CHANGE UP (ref 0–0.2)
BASOPHILS NFR BLD AUTO: 0.5 % — SIGNIFICANT CHANGE UP (ref 0–2)
BILIRUB SERPL-MCNC: < 0.2 MG/DL — LOW (ref 0.2–1.2)
BILIRUB UR-MCNC: NEGATIVE — SIGNIFICANT CHANGE UP
BLOOD UR QL VISUAL: HIGH
BUN SERPL-MCNC: 20 MG/DL — SIGNIFICANT CHANGE UP (ref 7–23)
CALCIUM SERPL-MCNC: 9.3 MG/DL — SIGNIFICANT CHANGE UP (ref 8.4–10.5)
CHLORIDE SERPL-SCNC: 99 MMOL/L — SIGNIFICANT CHANGE UP (ref 98–107)
CO2 SERPL-SCNC: 23 MMOL/L — SIGNIFICANT CHANGE UP (ref 22–31)
COLOR SPEC: SIGNIFICANT CHANGE UP
CREAT SERPL-MCNC: 0.63 MG/DL — SIGNIFICANT CHANGE UP (ref 0.5–1.3)
EOSINOPHIL # BLD AUTO: 0.24 K/UL — SIGNIFICANT CHANGE UP (ref 0–0.5)
EOSINOPHIL NFR BLD AUTO: 2.3 % — SIGNIFICANT CHANGE UP (ref 0–6)
GLUCOSE SERPL-MCNC: 84 MG/DL — SIGNIFICANT CHANGE UP (ref 70–99)
GLUCOSE UR-MCNC: NEGATIVE — SIGNIFICANT CHANGE UP
HCT VFR BLD CALC: 32.5 % — LOW (ref 34.5–45)
HGB BLD-MCNC: 10.2 G/DL — LOW (ref 11.5–15.5)
HYALINE CASTS # UR AUTO: NEGATIVE — SIGNIFICANT CHANGE UP
IMM GRANULOCYTES NFR BLD AUTO: 0.5 % — SIGNIFICANT CHANGE UP (ref 0–1.5)
KETONES UR-MCNC: NEGATIVE — SIGNIFICANT CHANGE UP
LEUKOCYTE ESTERASE UR-ACNC: SIGNIFICANT CHANGE UP
LIDOCAIN IGE QN: 50.3 U/L — SIGNIFICANT CHANGE UP (ref 7–60)
LYMPHOCYTES # BLD AUTO: 3.34 K/UL — HIGH (ref 1–3.3)
LYMPHOCYTES # BLD AUTO: 32.7 % — SIGNIFICANT CHANGE UP (ref 13–44)
MCHC RBC-ENTMCNC: 28.9 PG — SIGNIFICANT CHANGE UP (ref 27–34)
MCHC RBC-ENTMCNC: 31.4 % — LOW (ref 32–36)
MCV RBC AUTO: 92.1 FL — SIGNIFICANT CHANGE UP (ref 80–100)
MONOCYTES # BLD AUTO: 0.44 K/UL — SIGNIFICANT CHANGE UP (ref 0–0.9)
MONOCYTES NFR BLD AUTO: 4.3 % — SIGNIFICANT CHANGE UP (ref 2–14)
NEUTROPHILS # BLD AUTO: 6.1 K/UL — SIGNIFICANT CHANGE UP (ref 1.8–7.4)
NEUTROPHILS NFR BLD AUTO: 59.7 % — SIGNIFICANT CHANGE UP (ref 43–77)
NITRITE UR-MCNC: NEGATIVE — SIGNIFICANT CHANGE UP
NRBC # FLD: 0 K/UL — SIGNIFICANT CHANGE UP (ref 0–0)
PH UR: 6 — SIGNIFICANT CHANGE UP (ref 5–8)
PLATELET # BLD AUTO: 465 K/UL — HIGH (ref 150–400)
PMV BLD: 10.2 FL — SIGNIFICANT CHANGE UP (ref 7–13)
POTASSIUM SERPL-MCNC: 3.8 MMOL/L — SIGNIFICANT CHANGE UP (ref 3.5–5.3)
POTASSIUM SERPL-SCNC: 3.8 MMOL/L — SIGNIFICANT CHANGE UP (ref 3.5–5.3)
PROT SERPL-MCNC: 6.9 G/DL — SIGNIFICANT CHANGE UP (ref 6–8.3)
PROT UR-MCNC: NEGATIVE — SIGNIFICANT CHANGE UP
RBC # BLD: 3.53 M/UL — LOW (ref 3.8–5.2)
RBC # FLD: 13.8 % — SIGNIFICANT CHANGE UP (ref 10.3–14.5)
RBC CASTS # UR COMP ASSIST: HIGH (ref 0–?)
SODIUM SERPL-SCNC: 136 MMOL/L — SIGNIFICANT CHANGE UP (ref 135–145)
SP GR SPEC: 1.02 — SIGNIFICANT CHANGE UP (ref 1–1.04)
SQUAMOUS # UR AUTO: SIGNIFICANT CHANGE UP
UROBILINOGEN FLD QL: NORMAL — SIGNIFICANT CHANGE UP
WBC # BLD: 10.22 K/UL — SIGNIFICANT CHANGE UP (ref 3.8–10.5)
WBC # FLD AUTO: 10.22 K/UL — SIGNIFICANT CHANGE UP (ref 3.8–10.5)
WBC UR QL: HIGH (ref 0–?)

## 2020-07-01 PROCEDURE — 99284 EMERGENCY DEPT VISIT MOD MDM: CPT

## 2020-07-01 PROCEDURE — 76705 ECHO EXAM OF ABDOMEN: CPT | Mod: 26

## 2020-07-01 RX ORDER — IBUPROFEN 200 MG
1 TABLET ORAL
Qty: 28 | Refills: 0
Start: 2020-07-01 | End: 2020-07-07

## 2020-07-01 NOTE — ED PROVIDER NOTE - NSFOLLOWUPINSTRUCTIONS_ED_ALL_ED_FT
Rest, drink plenty of fluids.  Advance activity as tolerated.  Continue all previously prescribed medications as directed. Take Tylenol 650mg (Two 325 mg pills) every 4-6 hours as needed for pain. Take Motrin 600 mg every 8 hours as needed for moderate pain -- take with food. Follow up with your primary care physician and surgery in 48-72 hours- bring copies of your results.  Return to the ER for worsening or persistent symptoms, and/or ANY NEW OR CONCERNING SYMPTOMS. If you have issues obtaining follow up, please call: [phone] to obtain a doctor or specialist who takes your insurance in your area.

## 2020-07-01 NOTE — HISTORY OF PRESENT ILLNESS
[Delivery Date: ___] : on [unfilled] [Primary C/S] : delivered by  section [Male] : Delivery History: baby boy [Wt. ___] : weighing [unfilled] [Pertussis Vaccine] : Pertussis vaccine administered [Breastfeeding] : currently nursing [Clean/Dry/Intact] : clean, dry and intact [Last Pap Date: ___] : Last Pap Date: [unfilled] [Rhogam] : Rhogam was not administered [Rubella Vaccine] : Rubella vaccine was not administered [BTL] : no tubal ligation [Resumed Menses] : has not resumed her menses [Resumed [place]] : has not resumed intercourse [Erythema] : not erythematous [Swelling] : not swollen [Dehiscence] : not dehisced [FreeTextEntry8] : I am having pain INT # [id number] [FreeTextEntry9] : hospitalized for pyelonephritis and renal calculi during pregancy in april was on ursodial for elevated LFTs and pruritis bile acids NL [de-identified] : admitted for pyelonephritis. c section for fetal distress discharged on keflex [de-identified] : moderate to severe RUQ pain sometimes radiates to back pt states she has hx of gallstones [de-identified] : ruq tenderness to palpation [de-identified] : s/p c section on abx for pyelonephtitis c/o moderate to severe RUQ pain hx of gallstones [de-identified] : to ER RTC 1 wk for regular wound check and f/u. schedule 6 wk pp colpo at next visit

## 2020-07-01 NOTE — ED PROVIDER NOTE - ATTENDING CONTRIBUTION TO CARE
I performed a history and physical exam of the patient and discussed their management with the PA. I reviewed the PA's note and agree with the documented findings and plan of care. I have edited as appropriate. My medical decision making and observations are found above.  Pt with h/o cholelithiasis presents with RUQ pain, minimally tender on exam, pt appears comfortable. US w/o e/o cholecystitis, pain improved, will d/c with return precautions and surgery f/u I performed a history and physical exam of the patient and discussed their management with the PA. I reviewed the PA's note and agree with the documented findings and plan of care. I have edited as appropriate. My medical decision making and observations are found above.  Pt with h/o cholelithiasis presents with RUQ pain, minimally tender on exam, pt appears comfortable. US w/o e/o cholecystitis, pain improved, will d/c with return precautions and surgery f/u. Pt also currently being treated for pyelo, on abx, no CVAT, afeb, well appearing, no urinary complaints.

## 2020-07-01 NOTE — ED PROVIDER NOTE - OBJECTIVE STATEMENT
28 yo F, with PMH of gallstones, recent left kidney infection, s/p  1 week ago, presents to ER c/o abdominal right upper quadrant pain x3 days. Pt states pain is cramping, 5/10, intermittent, radiates to the back, last about 5 hours, worse after eating, especially spicy food, fatty food, condiments. Reports she had similar pain 1 year ago, told she has gallstones. Denies taking any pain medication. Admits she had recent left kidney infection, causing baby's low heart rate, had emergent  afterward. Admits taking Keflex. Denies any fever chills, n/v/d, dizziness, chest pain, sob, dysuria, or any other complaints. 28 yo F, with PMH of gallstones, recent left kidney infection, s/p  1 week ago, presents to ER c/o abdominal right upper quadrant pain x3 days. Pt states pain is cramping, 5/10, intermittent, radiates to the back, last about 5 hours, worse after eating, especially spicy food, fatty food, condiments. Reports she had similar pain 1 year ago, told she has gallstones. Denies taking any pain medication. Admits she had recent left kidney infection, causing baby's low heart rate, had emergent  afterward. Admits taking Keflex. Denies any fever chills, n/v/d, dizziness, chest pain, sob, dysuria, or any other complaints.    Azerbaijani translation: [id number]

## 2020-07-01 NOTE — ED PROVIDER NOTE - CLINICAL SUMMARY MEDICAL DECISION MAKING FREE TEXT BOX
28 yo F, with PMH of gallstones, recent left kidney infection, s/p  1 week ago, presents to ER c/o right upper quadrant abdominal  pain x3 days. well appearing female p/w acute RUQ abd pain, afebrile, a/w fatty food, likely biliary colic, will obtain US to r/o acute cholecystitis, labs, Ua, pain control, and reassess.

## 2020-07-01 NOTE — ED ADULT TRIAGE NOTE - PATIENT ON (OXYGEN DELIVERY METHOD)
[Breast Self Exam] : breast self exam [Nutrition] : nutrition [Exercise] : exercise [Vitamins/Supplements] : vitamins/supplements room air

## 2020-07-01 NOTE — ED PROVIDER NOTE - PATIENT PORTAL LINK FT
You can access the FollowMyHealth Patient Portal offered by [facility] by registering at the following website: [email or web address]. By joining [facility]’s FollowMyHealth portal, you will also be able to view your health information using other applications (apps) compatible with our system.

## 2020-07-01 NOTE — ED PROVIDER NOTE - PROGRESS NOTE DETAILS
PA BOB: Patient reassessed, sitting comfortably in chair in NAD, denies any complaints. States feeling better, symptoms improved a lot, barely any pain right now. US shows sludge and stones, no acute cholecystitis; abd nontender. Discussed with attending, patient can be discharged home to f/u with PCP/surgery. Strict return precaution given to patient. The patient was given verbal and written discharge instructions. Specifically, instructions when to return to the ED and when to seek follow-up from their pcp was discussed. Any specialty follow-up was discussed, including how to make an appointment.  Instructions were discussed in simple, plain language and was understood by the patient. The patient understands that should their symptoms worsen or any new symptoms arise, they should return to the ED immediately for further evaluation. All pt's questions were answered. Patient verbalizes understanding. PA BOB: Patient reassessed, sitting comfortably in chair in NAD, denies any complaints. States feeling better, symptoms improved a lot, barely any pain right now. US shows sludge and stones, no acute cholecystitis; abd nontender. Discussed with attending, patient can be discharged home to f/u with PCP/surgery. Strict return precaution given to patient. The patient was given verbal and written discharge instructions. Specifically, instructions when to return to the ED and when to seek follow-up from their pcp was discussed. Any specialty follow-up was discussed, including how to make an appointment.  Instructions were discussed in simple, plain language and was understood by the patient. The patient understands that should their symptoms worsen or any new symptoms arise, they should return to the ED immediately for further evaluation. All pt's questions were answered. Patient verbalizes understanding.    Nauruan translation: [phone]

## 2020-07-05 LAB — SURGICAL PATHOLOGY STUDY: SIGNIFICANT CHANGE UP

## 2020-07-07 ENCOUNTER — APPOINTMENT (OUTPATIENT)
Dept: OBGYN | Facility: HOSPITAL | Age: 30
End: 2020-07-07

## 2020-07-07 ENCOUNTER — INPATIENT (INPATIENT)
Facility: HOSPITAL | Age: 30
LOS: 2 days | Discharge: HOME | End: 2020-07-10
Attending: SURGERY | Admitting: SURGERY
Payer: MEDICAID

## 2020-07-07 VITALS
WEIGHT: 139.99 LBS | OXYGEN SATURATION: 100 % | HEIGHT: 61.02 IN | DIASTOLIC BLOOD PRESSURE: 89 MMHG | RESPIRATION RATE: 16 BRPM | TEMPERATURE: 99 F | HEART RATE: 60 BPM | SYSTOLIC BLOOD PRESSURE: 148 MMHG

## 2020-07-07 DIAGNOSIS — Z98.891 HISTORY OF UTERINE SCAR FROM PREVIOUS SURGERY: Chronic | ICD-10-CM

## 2020-07-07 LAB
ALBUMIN SERPL ELPH-MCNC: 4 G/DL — SIGNIFICANT CHANGE UP (ref 3.3–5)
ALP SERPL-CCNC: 136 U/L — HIGH (ref 40–120)
ALT FLD-CCNC: 30 U/L — SIGNIFICANT CHANGE UP (ref 4–33)
ANION GAP SERPL CALC-SCNC: 12 MMO/L — SIGNIFICANT CHANGE UP (ref 7–14)
APPEARANCE UR: CLEAR — SIGNIFICANT CHANGE UP
AST SERPL-CCNC: 16 U/L — SIGNIFICANT CHANGE UP (ref 4–32)
BACTERIA # UR AUTO: NEGATIVE — SIGNIFICANT CHANGE UP
BASE EXCESS BLDV CALC-SCNC: -0.9 MMOL/L — SIGNIFICANT CHANGE UP
BASOPHILS # BLD AUTO: 0.03 K/UL — SIGNIFICANT CHANGE UP (ref 0–0.2)
BASOPHILS NFR BLD AUTO: 0.3 % — SIGNIFICANT CHANGE UP (ref 0–2)
BILIRUB SERPL-MCNC: 0.2 MG/DL — SIGNIFICANT CHANGE UP (ref 0.2–1.2)
BILIRUB UR-MCNC: NEGATIVE — SIGNIFICANT CHANGE UP
BLOOD GAS VENOUS - CREATININE: 0.63 MG/DL — SIGNIFICANT CHANGE UP (ref 0.5–1.3)
BLOOD GAS VENOUS - FIO2: 21 — SIGNIFICANT CHANGE UP
BLOOD UR QL VISUAL: SIGNIFICANT CHANGE UP
BUN SERPL-MCNC: 15 MG/DL — SIGNIFICANT CHANGE UP (ref 7–23)
CALCIUM SERPL-MCNC: 9.1 MG/DL — SIGNIFICANT CHANGE UP (ref 8.4–10.5)
CHLORIDE BLDV-SCNC: 108 MMOL/L — SIGNIFICANT CHANGE UP (ref 96–108)
CHLORIDE SERPL-SCNC: 104 MMOL/L — SIGNIFICANT CHANGE UP (ref 98–107)
CO2 SERPL-SCNC: 22 MMOL/L — SIGNIFICANT CHANGE UP (ref 22–31)
COLOR SPEC: SIGNIFICANT CHANGE UP
CREAT SERPL-MCNC: 0.68 MG/DL — SIGNIFICANT CHANGE UP (ref 0.5–1.3)
EOSINOPHIL # BLD AUTO: 0.1 K/UL — SIGNIFICANT CHANGE UP (ref 0–0.5)
EOSINOPHIL NFR BLD AUTO: 0.9 % — SIGNIFICANT CHANGE UP (ref 0–6)
GAS PNL BLDV: 141 MMOL/L — SIGNIFICANT CHANGE UP (ref 136–146)
GLUCOSE BLDV-MCNC: 81 MG/DL — SIGNIFICANT CHANGE UP (ref 70–99)
GLUCOSE SERPL-MCNC: 110 MG/DL — HIGH (ref 70–99)
GLUCOSE UR-MCNC: NEGATIVE — SIGNIFICANT CHANGE UP
HBA1C BLD-MCNC: 5.5 % — SIGNIFICANT CHANGE UP (ref 4–5.6)
HCO3 BLDV-SCNC: 23 MMOL/L — SIGNIFICANT CHANGE UP (ref 20–27)
HCT VFR BLD CALC: 35.9 % — SIGNIFICANT CHANGE UP (ref 34.5–45)
HCT VFR BLDV CALC: 35.3 % — SIGNIFICANT CHANGE UP (ref 34.5–45)
HGB BLD-MCNC: 11.6 G/DL — SIGNIFICANT CHANGE UP (ref 11.5–15.5)
HGB BLDV-MCNC: 11.4 G/DL — LOW (ref 11.5–15.5)
HYALINE CASTS # UR AUTO: NEGATIVE — SIGNIFICANT CHANGE UP
IMM GRANULOCYTES NFR BLD AUTO: 0.4 % — SIGNIFICANT CHANGE UP (ref 0–1.5)
KETONES UR-MCNC: NEGATIVE — SIGNIFICANT CHANGE UP
LACTATE BLDV-MCNC: 0.9 MMOL/L — SIGNIFICANT CHANGE UP (ref 0.5–2)
LEUKOCYTE ESTERASE UR-ACNC: SIGNIFICANT CHANGE UP
LIDOCAIN IGE QN: 31.2 U/L — SIGNIFICANT CHANGE UP (ref 7–60)
LYMPHOCYTES # BLD AUTO: 1.55 K/UL — SIGNIFICANT CHANGE UP (ref 1–3.3)
LYMPHOCYTES # BLD AUTO: 14.2 % — SIGNIFICANT CHANGE UP (ref 13–44)
MCHC RBC-ENTMCNC: 29.7 PG — SIGNIFICANT CHANGE UP (ref 27–34)
MCHC RBC-ENTMCNC: 32.3 % — SIGNIFICANT CHANGE UP (ref 32–36)
MCV RBC AUTO: 91.8 FL — SIGNIFICANT CHANGE UP (ref 80–100)
MONOCYTES # BLD AUTO: 0.23 K/UL — SIGNIFICANT CHANGE UP (ref 0–0.9)
MONOCYTES NFR BLD AUTO: 2.1 % — SIGNIFICANT CHANGE UP (ref 2–14)
NEUTROPHILS # BLD AUTO: 8.93 K/UL — HIGH (ref 1.8–7.4)
NEUTROPHILS NFR BLD AUTO: 82.1 % — HIGH (ref 43–77)
NITRITE UR-MCNC: NEGATIVE — SIGNIFICANT CHANGE UP
NRBC # FLD: 0 K/UL — SIGNIFICANT CHANGE UP (ref 0–0)
PCO2 BLDV: 46 MMHG — SIGNIFICANT CHANGE UP (ref 41–51)
PH BLDV: 7.34 PH — SIGNIFICANT CHANGE UP (ref 7.32–7.43)
PH UR: 5.5 — SIGNIFICANT CHANGE UP (ref 5–8)
PLATELET # BLD AUTO: 321 K/UL — SIGNIFICANT CHANGE UP (ref 150–400)
PMV BLD: 11.3 FL — SIGNIFICANT CHANGE UP (ref 7–13)
PO2 BLDV: 31 MMHG — LOW (ref 35–40)
POTASSIUM BLDV-SCNC: 3.5 MMOL/L — SIGNIFICANT CHANGE UP (ref 3.4–4.5)
POTASSIUM SERPL-MCNC: 4 MMOL/L — SIGNIFICANT CHANGE UP (ref 3.5–5.3)
POTASSIUM SERPL-SCNC: 4 MMOL/L — SIGNIFICANT CHANGE UP (ref 3.5–5.3)
PROT SERPL-MCNC: 7.3 G/DL — SIGNIFICANT CHANGE UP (ref 6–8.3)
PROT UR-MCNC: 10 — SIGNIFICANT CHANGE UP
RBC # BLD: 3.91 M/UL — SIGNIFICANT CHANGE UP (ref 3.8–5.2)
RBC # FLD: 13.4 % — SIGNIFICANT CHANGE UP (ref 10.3–14.5)
RBC CASTS # UR COMP ASSIST: SIGNIFICANT CHANGE UP (ref 0–?)
SAO2 % BLDV: 49 % — LOW (ref 60–85)
SARS-COV-2 RNA SPEC QL NAA+PROBE: SIGNIFICANT CHANGE UP
SODIUM SERPL-SCNC: 138 MMOL/L — SIGNIFICANT CHANGE UP (ref 135–145)
SP GR SPEC: 1.03 — SIGNIFICANT CHANGE UP (ref 1–1.04)
SQUAMOUS # UR AUTO: SIGNIFICANT CHANGE UP
UROBILINOGEN FLD QL: NORMAL — SIGNIFICANT CHANGE UP
WBC # BLD: 10.88 K/UL — HIGH (ref 3.8–10.5)
WBC # FLD AUTO: 10.88 K/UL — HIGH (ref 3.8–10.5)
WBC UR QL: HIGH (ref 0–?)

## 2020-07-07 PROCEDURE — 99218: CPT

## 2020-07-07 PROCEDURE — 78226 HEPATOBILIARY SYSTEM IMAGING: CPT | Mod: 26,GC

## 2020-07-07 PROCEDURE — 99222 1ST HOSP IP/OBS MODERATE 55: CPT | Mod: GC,57

## 2020-07-07 PROCEDURE — 74177 CT ABD & PELVIS W/CONTRAST: CPT | Mod: 26

## 2020-07-07 PROCEDURE — 71046 X-RAY EXAM CHEST 2 VIEWS: CPT | Mod: 26

## 2020-07-07 PROCEDURE — 76705 ECHO EXAM OF ABDOMEN: CPT | Mod: 26

## 2020-07-07 RX ORDER — HYDROMORPHONE HYDROCHLORIDE 2 MG/ML
1 INJECTION INTRAMUSCULAR; INTRAVENOUS; SUBCUTANEOUS ONCE
Refills: 0 | Status: DISCONTINUED | OUTPATIENT
Start: 2020-07-07 | End: 2020-07-07

## 2020-07-07 RX ORDER — ENOXAPARIN SODIUM 100 MG/ML
40 INJECTION SUBCUTANEOUS DAILY
Refills: 0 | Status: DISCONTINUED | OUTPATIENT
Start: 2020-07-07 | End: 2020-07-10

## 2020-07-07 RX ORDER — MORPHINE SULFATE 50 MG/1
4 CAPSULE, EXTENDED RELEASE ORAL ONCE
Refills: 0 | Status: DISCONTINUED | OUTPATIENT
Start: 2020-07-07 | End: 2020-07-07

## 2020-07-07 RX ORDER — CEFOTETAN DISODIUM 1 G
2 VIAL (EA) INJECTION EVERY 12 HOURS
Refills: 0 | Status: DISCONTINUED | OUTPATIENT
Start: 2020-07-07 | End: 2020-07-09

## 2020-07-07 RX ORDER — SODIUM CHLORIDE 9 MG/ML
1000 INJECTION INTRAMUSCULAR; INTRAVENOUS; SUBCUTANEOUS ONCE
Refills: 0 | Status: COMPLETED | OUTPATIENT
Start: 2020-07-07 | End: 2020-07-07

## 2020-07-07 RX ORDER — HYDROMORPHONE HYDROCHLORIDE 2 MG/ML
0.5 INJECTION INTRAMUSCULAR; INTRAVENOUS; SUBCUTANEOUS EVERY 4 HOURS
Refills: 0 | Status: DISCONTINUED | OUTPATIENT
Start: 2020-07-07 | End: 2020-07-09

## 2020-07-07 RX ORDER — ACETAMINOPHEN 500 MG
650 TABLET ORAL EVERY 6 HOURS
Refills: 0 | Status: DISCONTINUED | OUTPATIENT
Start: 2020-07-07 | End: 2020-07-09

## 2020-07-07 RX ORDER — SODIUM CHLORIDE 9 MG/ML
1000 INJECTION INTRAMUSCULAR; INTRAVENOUS; SUBCUTANEOUS
Refills: 0 | Status: DISCONTINUED | OUTPATIENT
Start: 2020-07-07 | End: 2020-07-07

## 2020-07-07 RX ORDER — HYDROMORPHONE HYDROCHLORIDE 2 MG/ML
1 INJECTION INTRAMUSCULAR; INTRAVENOUS; SUBCUTANEOUS EVERY 4 HOURS
Refills: 0 | Status: DISCONTINUED | OUTPATIENT
Start: 2020-07-07 | End: 2020-07-09

## 2020-07-07 RX ORDER — CEFTRIAXONE 500 MG/1
1000 INJECTION, POWDER, FOR SOLUTION INTRAMUSCULAR; INTRAVENOUS ONCE
Refills: 0 | Status: COMPLETED | OUTPATIENT
Start: 2020-07-07 | End: 2020-07-07

## 2020-07-07 RX ORDER — ONDANSETRON 8 MG/1
4 TABLET, FILM COATED ORAL ONCE
Refills: 0 | Status: COMPLETED | OUTPATIENT
Start: 2020-07-07 | End: 2020-07-07

## 2020-07-07 RX ORDER — SODIUM CHLORIDE 9 MG/ML
1000 INJECTION, SOLUTION INTRAVENOUS
Refills: 0 | Status: DISCONTINUED | OUTPATIENT
Start: 2020-07-07 | End: 2020-07-09

## 2020-07-07 RX ADMIN — SODIUM CHLORIDE 1000 MILLILITER(S): 9 INJECTION INTRAMUSCULAR; INTRAVENOUS; SUBCUTANEOUS at 08:38

## 2020-07-07 RX ADMIN — MORPHINE SULFATE 4 MILLIGRAM(S): 50 CAPSULE, EXTENDED RELEASE ORAL at 08:51

## 2020-07-07 RX ADMIN — HYDROMORPHONE HYDROCHLORIDE 1 MILLIGRAM(S): 2 INJECTION INTRAMUSCULAR; INTRAVENOUS; SUBCUTANEOUS at 11:53

## 2020-07-07 RX ADMIN — MORPHINE SULFATE 4 MILLIGRAM(S): 50 CAPSULE, EXTENDED RELEASE ORAL at 10:47

## 2020-07-07 RX ADMIN — CEFTRIAXONE 100 MILLIGRAM(S): 500 INJECTION, POWDER, FOR SOLUTION INTRAMUSCULAR; INTRAVENOUS at 13:48

## 2020-07-07 RX ADMIN — SODIUM CHLORIDE 125 MILLILITER(S): 9 INJECTION INTRAMUSCULAR; INTRAVENOUS; SUBCUTANEOUS at 17:38

## 2020-07-07 NOTE — ED PROVIDER NOTE - PROGRESS NOTE DETAILS
7mm obstructing left distal ureter stone, + hydro , + pyelo  Given dilaudid for pain,  consult called and pending Uro saw patient- will send to CDU for continued pain management. "No evidence of sepsis at this time". Urine culture collected and sent to lab. CDU team will continue to monitor. Plan to proceed with HIDA scan as pain still localized to RUQ. Discussed and accepted by CDU Dr. Tavera and SKYLER Oconnor

## 2020-07-07 NOTE — ED PROVIDER NOTE - OBJECTIVE STATEMENT
28 yo female, recent  2 weeks ago ( no complications) , gallstones, comes to the ED co persistent RUQ abd pain, radiating to the back for the past week.  Was seen  , dx with gallstones. As per patient as of last night the pain got worse and is nauseas and vomiting. Denies any fevers, chills, c pain, sob, diarrhea, urinary complaints, known positive covid contacts or any other complaints.  ID [id number]  30 yo female, recent  2 weeks ago ( no complications) , gallstones, comes to the ED co persistent RUQ abd pain, radiating to the back for the past week.  Was seen  , dx with gallstones. As per patient as of last night the pain got worse and is nauseas and vomiting. Denies any fevers, chills, c pain, sob, diarrhea, urinary complaints, known positive covid contacts or any other complaints.

## 2020-07-07 NOTE — ED PROVIDER NOTE - ATTENDING CONTRIBUTION TO CARE
I performed a history and physical exam of the patient and discussed their management with the PA. I reviewed the PA's note and agree with the documented findings and plan of care. I have edited as appropriate. My medical decision making and observations are found above.  +TTP epigastric and RUQ, uncomfortable appearing.

## 2020-07-07 NOTE — ED ADULT NURSE REASSESSMENT NOTE - NS ED NURSE REASSESS COMMENT FT1
pt resting in results waiting. c.o 6/10 right sided abdominal pain, denies need for medication at this time. Waiting for ct results

## 2020-07-07 NOTE — ED PROVIDER NOTE - PMH
Gallstone    Kidney infection    Pyelonephritis affecting pregnancy  admit 4/23/2020 rx with cephtriaxone

## 2020-07-07 NOTE — ED CDU PROVIDER INITIAL DAY NOTE - NS ED ROS FT
warm
Constitutional: No weakness or fatigue, no fevers or chills  Skin: No rash or pruritis  HEENT: No sore throat  Cardiovascular: No chest pain, no shortness of breath  Respiratory: No shortness of breath, no cough  Gastrointestinal: +abdominal pain, no nausea, no vomiting, no diarrhea, no constipation  Musculoskeletal: No joint pain  Genitourinary: No dysuria or hematuria  Neurological: No focal weakness or tingling

## 2020-07-07 NOTE — CONSULT NOTE ADULT - ASSESSMENT
Assessment:  29 Turkish-speaking female with hx of pyelonephritis (2020),  (2 weeks ago), and known gallstone (last seen in ED ) presents with worsening RUQ pain. US and CT show cholelithiasis without acute cholecystitis. Patient is hemodynamically stable    Plan:  - No acute surgical intervention  - f/u HIDA  - Plan to be discussed with attending, Dr. Mendoza    [id number]

## 2020-07-07 NOTE — CONSULT NOTE ADULT - ASSESSMENT
29yoF with RUQ and 7mm distal L ureteral stone    -Patient has no pain on L, only has RUQ pain.  Patient's distal L ureteral stone is highly unlikely to be the cause of her RUQ pain  -Can follow up as outpatient for surgical scheduling if her stone does not pass  -No indication for ureteral stent at this time  -Please send urine culture  -Follow up as outpatient with Dr. Alisia Norwood [facility] for Urology  [address] [place] [address]   [phone]

## 2020-07-07 NOTE — CONSULT NOTE ADULT - SUBJECTIVE AND OBJECTIVE BOX
SURGERY CONSULT NOTE    Patient is a 29y old Female who presents with a chief complaint of RUQ pain     [id number]    HPI:   29 Upper sorbian-speaking female with hx of recurrent UTI, pyelonephritis (2020),  (2 weeks ago), and known gallstone (last seen in ED ) presents with RUQ pain x 2 weeks that worsens today. Patient reports abdominal pain 1-3 hours after eating greasy, spicy or fried foods, usually lasts for a few hours. However, pain not subside since last night, not improved with pain medication. Associated with N/V. Denies fever/chills/CP/SOB    10-points review of system performed with pertinent negative and positive findings documented in the HPI     PAST MEDICAL & SURGICAL HISTORY:  Kidney infection  Gallstone  Pyelonephritis affecting pregnancy: admit 2020 rx with cephtriaxone  H/O:   No significant past surgical history    FAMILY HISTORY:  : Family history not pertinent as reviewed with the patient and family    SOCIAL HISTORY: No pertinent social history    MEDICATIONS  (STANDING):  sodium chloride 0.9%. 1000 milliLiter(s) (125 mL/Hr) IV Continuous <Continuous>    MEDICATIONS  (PRN):    Allergies    No Known Allergies    Intolerances        Vital Signs Last 24 Hrs  T(C): 36.9 (2020 15:32), Max: 37.2 (2020 07:31)  T(F): 98.4 (2020 15:32), Max: 98.9 (2020 07:31)  HR: 58 (2020 15:32) (58 - 60)  BP: 106/62 (2020 15:32) (106/62 - 148/89)  BP(mean): --  RR: 16 (2020 15:32) (16 - 18)  SpO2: 98% (2020 15:32) (98% - 100%)  Daily Height in cm: 155 (2020 07:31)    Daily     Physical Exam:  General: AAOx3, sitting in recliner, NAD  Resp: nonlabored breathing  Abd: soft, NT, ND, no rebound or guarding. R CVA tenderness; neg L CVA tenderness                        11.6   10.88 )-----------( 321      ( 2020 08:30 )             35.9     07-07    138  |  104  |  15  ----------------------------<  110<H>  4.0   |  22  |  0.68    Ca    9.1      2020 08:30    TPro  7.3  /  Alb  4.0  /  TBili  0.2  /  DBili  x   /  AST  16  /  ALT  30  /  AlkPhos  136<H>  07-07      Urinalysis Basic - ( 2020 09:05 )    Color: LIGHT YELLOW / Appearance: CLEAR / S.027 / pH: 5.5  Gluc: NEGATIVE / Ketone: NEGATIVE  / Bili: NEGATIVE / Urobili: NORMAL   Blood: TRACE / Protein: 10 / Nitrite: NEGATIVE   Leuk Esterase: SMALL / RBC: 3-5 / WBC 6-10   Sq Epi: OCC / Non Sq Epi: x / Bacteria: NEGATIVE        IMAGING STUDIES:  < from: US Abdomen Limited (20 @ 09:39) >  IMPRESSION:     Biliary sludge in the gallbladder neck. No evidence of cholelithiasis or acute cholecystitis.    Enlarged fatty liver.    < end of copied text >          < from: CT Abdomen and Pelvis w/ IV Cont (20 @ 11:03) >    IMPRESSION:     1.There is a 7 mm obstructing calculus in the distal left ureter at the pelvic brim with associated mild left hydroureter.  2. Focal hypodense area within the anterior left interpolar region, possibly focal pyelonephritis.  3. There is cholelithiasis without imaging evidence of acute cholecystitis.  4. The uterus appears enlarged and edematous that could be secondary to recent .        < end of copied text >

## 2020-07-07 NOTE — ED PROVIDER NOTE - CLINICAL SUMMARY MEDICAL DECISION MAKING FREE TEXT BOX
30 yo female, recent  2 weeks ago ( no complications) , gallstones, comes to the ED co persistent RUQ abd pain, radiating to the back for the past week.  Was seen  , dx with gallstones. As per patient as of last night the pain got worse and is nauseas and vomiting. Denies any fevers, chills, c pain, sob, diarrhea, urinary complaints, known positive covid contacts or any other complaints.  RUQ abd pain, will check labs, gve fluids, pain control/anti-emetics, US and re-eval

## 2020-07-07 NOTE — H&P ADULT - HISTORY OF PRESENT ILLNESS
SURGERY CONSULT NOTE    Patient is a 29y old Female who presents with a chief complaint of RUQ pain     [id number]    HPI:   29 Kuwaiti-speaking female with hx of recurrent UTI, pyelonephritis (2020),  (2 weeks ago), and known gallstone (last seen in ED ) presents with RUQ pain x 2 weeks that worsens today. Patient reports abdominal pain 1-3 hours after eating greasy, spicy or fried foods, usually lasts for a few hours. However, pain not subside since last night, not improved with pain medication. Associated with N/V. Denies fever/chills/CP/SOB    10-points review of system performed with pertinent negative and positive findings documented in the HPI     PAST MEDICAL & SURGICAL HISTORY:  Kidney infection  Gallstone  Pyelonephritis affecting pregnancy: admit 2020 rx with cephtriaxone  H/O:   No significant past surgical history    FAMILY HISTORY:  : Family history not pertinent as reviewed with the patient and family    SOCIAL HISTORY: No pertinent social history    MEDICATIONS  (STANDING):  sodium chloride 0.9%. 1000 milliLiter(s) (125 mL/Hr) IV Continuous <Continuous>    MEDICATIONS  (PRN):    Allergies    No Known Allergies    Intolerances        Vital Signs Last 24 Hrs  T(C): 36.9 (2020 15:32), Max: 37.2 (2020 07:31)  T(F): 98.4 (2020 15:32), Max: 98.9 (2020 07:31)  HR: 58 (2020 15:32) (58 - 60)  BP: 106/62 (2020 15:32) (106/62 - 148/89)  BP(mean): --  RR: 16 (2020 15:32) (16 - 18)  SpO2: 98% (2020 15:32) (98% - 100%)  Daily Height in cm: 155 (2020 07:31)    Daily     Physical Exam:  General: AAOx3, sitting in recliner, NAD  Resp: nonlabored breathing  Abd: soft, NT, ND, no rebound or guarding. R CVA tenderness; neg L CVA tenderness                        11.6   10.88 )-----------( 321      ( 2020 08:30 )             35.9     07-07    138  |  104  |  15  ----------------------------<  110<H>  4.0   |  22  |  0.68    Ca    9.1      2020 08:30    TPro  7.3  /  Alb  4.0  /  TBili  0.2  /  DBili  x   /  AST  16  /  ALT  30  /  AlkPhos  136<H>  07-07      Urinalysis Basic - ( 2020 09:05 )    Color: LIGHT YELLOW / Appearance: CLEAR / S.027 / pH: 5.5  Gluc: NEGATIVE / Ketone: NEGATIVE  / Bili: NEGATIVE / Urobili: NORMAL   Blood: TRACE / Protein: 10 / Nitrite: NEGATIVE   Leuk Esterase: SMALL / RBC: 3-5 / WBC 6-10   Sq Epi: OCC / Non Sq Epi: x / Bacteria: NEGATIVE        IMAGING STUDIES:  < from: US Abdomen Limited (20 @ 09:39) >  IMPRESSION:     Biliary sludge in the gallbladder neck. No evidence of cholelithiasis or acute cholecystitis.    Enlarged fatty liver.    < end of copied text >          < from: CT Abdomen and Pelvis w/ IV Cont (20 @ 11:03) >    IMPRESSION:     1.There is a 7 mm obstructing calculus in the distal left ureter at the pelvic brim with associated mild left hydroureter.  2. Focal hypodense area within the anterior left interpolar region, possibly focal pyelonephritis.  3. There is cholelithiasis without imaging evidence of acute cholecystitis.  4. The uterus appears enlarged and edematous that could be secondary to recent .        < end of copied text >            Assessment and Recommendation:   · Assessment		  Assessment:  29 Kuwaiti-speaking female with hx of pyelonephritis (2020),  (2 weeks ago), and known gallstone (last seen in ED ) presents with worsening RUQ pain. US and CT show cholelithiasis without acute cholecystitis. Patient is hemodynamically stable    Plan:  Admit to Surgery  Added on for lap naima  IV Cefotetan  COVID-  HCG -

## 2020-07-07 NOTE — H&P ADULT - NSHPLABSRESULTS_GEN_ALL_CORE
< from: NM Hepatobiliary Imaging (07.07.20 @ 21:27) >    CLINICAL INFORMATION: 29 year old woman with right lower quadrant abdominal pain; referred to evaluate for acute cholecystitis.    TECHNIQUE:  Dynamic images of the anterior abdomen were obtained for 2 hours following radiopharmaceutical injection followed by static images of the abdomen in the anterior, right anterior oblique and rightlateral projections. Morphine 2.5 mg I.V. and a second dose of radiopharmaceutical were administered at approximately 60 minutes.    COMPARISON: CT abdomen/pelvis 7/7/2020.     FINDINGS: There is prompt, homogeneous uptake of radiopharmaceutical by the hepatocytes. Activity is first seen in the bowel at about 35 minutes. The gallbladder is not visualized at any time during the study, despite morphine administration. There is good clearance of activity from the liver at the end of the study.    IMPRESSION: Abnormal morphine-augmented hepatobiliary scan: acute cholecystitis.    SKYLER Michelle  was informed of these results by Dr. Ojeda with read back at about 9:53pm on 7/7/2020      < end of copied text >

## 2020-07-07 NOTE — ED CDU PROVIDER INITIAL DAY NOTE - MEDICAL DECISION MAKING DETAILS
30 yo female with h/o uncomplicated  2 weeks ago, gallstones, pyelonephritis 2 months ago while pregnant requiring admission, p/w 1 week of RUQ pain radiating to back, worse after eating food, associated with nausea and vomiting.  Imaging results and Urology reccs per HPI. Patient dispo-ed to CDU for pain management, HIDA scan, IV antibiotics, urine culture to follow, and general observation if worsening or new pain relating to renal stone. 28 yo female with h/o uncomplicated  2 weeks ago, gallstones, pyelonephritis 2 months ago while pregnant requiring admission, p/w 1 week of RUQ pain radiating to back, worse after eating food, associated with nausea and vomiting.  Imaging results and Urology reccs per HPI. Patient dispo-ed to CDU for pain management, HIDA scan, IV antibiotics, urine culture to follow, and general observation if worsening or new pain relating to renal stone. surgery consult.

## 2020-07-07 NOTE — ED ADULT NURSE NOTE - CHIEF COMPLAINT QUOTE
Pt arrives to ED c/o RUQ pain with radiation to back.  Pt reports visiting the ED on 7/1/20 and received diagnosis of gallstones. Pt also reports N/V.  Pt is Mongolian speaking and agrees to translation services.

## 2020-07-07 NOTE — ED CDU PROVIDER DISPOSITION NOTE - CLINICAL COURSE
30y/o F presented with abd pain. CTAP showed L sided 7mm obstructing stone, seen by urology, state stone unlikely. RUQ US shoed biliary sludge in gallbladder neck. Seen by surgery recommended HIDA scan, which was positive for cholecystitis. Admitted to surgery

## 2020-07-07 NOTE — H&P ADULT - NSICDXPASTMEDICALHX_GEN_ALL_CORE_FT
PAST MEDICAL HISTORY:  Gallstone     Kidney infection     Pyelonephritis affecting pregnancy admit 4/23/2020 rx with cephtriaxone

## 2020-07-07 NOTE — ED ADULT NURSE NOTE - OBJECTIVE STATEMENT
pt received at intake rm 5 AAO x 3. pt primarily Mongolian speaking. able to communicate with pt in Mongolian. pt reports RUQ pain with radiation to her back. pt states pain got worse overnight associated with nausea. pt denies sob, chest pain, vomiting, diarrhea, fevers, chills, urinary symptoms. respirations even and unlabored. 20g iv placed to left ac. labs drawn and sent.

## 2020-07-07 NOTE — CONSULT NOTE ADULT - SUBJECTIVE AND OBJECTIVE BOX
30 yo female, recent  2 weeks ago ( no complications) , gallstones, comes to the ED co persistent RUQ abd pain, radiating to the back for the past week.  Was seen  , dx with gallstones. As per patient as of last night the pain got worse and is nauseas and vomiting.     Urology consulted for known 7mm L distal ureteral stone.  Patient has no pain on L side. Denies any fevers, chills, c pain, sob, diarrhea, urinary complaints, known positive covid contacts or any other complaints.    PAST MEDICAL & SURGICAL HISTORY:  Kidney infection  Gallstone  Pyelonephritis affecting pregnancy: admit 2020 rx with cephtriaxone  H/O:   No significant past surgical history    No Known Allergies    REVIEW OF SYSTEMS: Pertinent positives and negatives as stated in HPI, otherwise negative    Vital signs  T(C): 37.2, Max: 37.2 ( @ 07:31)  HR: 58  BP: 135/70  SpO2: 100%    Physical Exam  Gen: NAD  Abd: Soft  : No CVAT    LABS:   @ 08:30  WBC 10.88 / Hct 35.9  / SCr 0.68       138  |  104  |  15  ----------------------------<  110<H>  4.0   |  22  |  0.68    Ca    9.1      2020 08:30    TPro  7.3  /  Alb  4.0  /  TBili  0.2  /  DBili  x   /  AST  16  /  ALT  30  /  AlkPhos  136<H>      Urinalysis Basic - ( 2020 09:05 )  Color: LIGHT YELLOW / Appearance: CLEAR / S.027 / pH: 5.5  Gluc: NEGATIVE / Ketone: NEGATIVE  / Bili: NEGATIVE / Urobili: NORMAL   Blood: TRACE / Protein: 10 / Nitrite: NEGATIVE   Leuk Esterase: SMALL / RBC: 3-5 / WBC 6-10   Sq Epi: OCC / Non Sq Epi: x / Bacteria: NEGATIVE    Urine Cx: requested    RADIOLOGY:  < from: CT Abdomen and Pelvis w/ IV Cont (20 @ 11:03) >  ADRENALS: Within normal limits.  KIDNEYS/URETERS: There is a 9 mm obstructing calculus in the distal left ureter at the pelvic brim with associated mild left hydroureter. There is a nonobstructing 4 mm renal calculus at the left lower pole. Focal hypodense area within the anterior left interpolar region. There is no hydronephrosis. There are no renal masses.   BLADDER: Within normal limits.  REPRODUCTIVE ORGANS: The uterus appears enlarged and edematous with internal regions of enhancement along the endometrium.  BOWEL: No bowel obstruction. Appendix is normal.  PERITONEUM: No ascites.  VESSELS: Within normal limits.  RETROPERITONEUM/LYMPH NODES: No lymphadenopathy.    ABDOMINAL WALL: Within normal limits. There is a tiny fat-containing paraumbilical hernia.  BONES: Within normal limits.    IMPRESSION:   1.There is a 7 mm obstructing calculus in the distal left ureter at the pelvic brim with associated mild left hydroureter.  2. Focal hypodense area within the anterior left interpolar region, possibly focal pyelonephritis.  3. There is cholelithiasis without imaging evidence of acute cholecystitis.  4. The uterus appears enlarged and edematous that could be secondary to recent .

## 2020-07-07 NOTE — ED CDU PROVIDER DISPOSITION NOTE - NS_EDPROVIDERDISPOUSERTYPE_ED_A_ED
Subjective     Patient ID:  Robert Louis is a 80 y.o. ( 1951) female who presents for the following:   Pre-op Exam and Arm swelling (right arm x 1 week)      HPI   She presents to request a preop clearance for cataract surgery. Her last visit with our office was almost 5 months ago. She is a poor historian and is unable to recall the medications that she is taking. She has a history of medical noncompliance. Seizure disorder:   Reports that her last seizure was about 3 months ago. She is followed by Dr. Froylan Abdul. She thinks her last visit was around 6 months ago. Diabetes, type 2 follow up:  Taking medications as prescribed: Yes  Following diabetic diet: No   Exercise: No   Checking blood sugar: Yes, checks every 2-3 days, usually 175 - 200   Symptoms of hyperglycemia: none  Symptoms of hypoglycemia: none    Hypertension follow up:  History of hypertension, hyperlipidemia, stroke, and PE. She is on Eliquis  States she does not currently have a cardiologist.  Taking medications as prescribed: Yes  Checking BP at home: No   Symptoms: New onset in the last several months of shortness of breath when lying down and chest pain with activity and rest.   Low sodium diet: No   Exercise: No      Breast cancer:   Referred to oncology for breast cancer follow up. Has not been in a year. Limited mobility:  Has had a power wheelchair since her stroke in . Walking is severely limited due to bilateral leg pain. Can walk about 100 feet with slow gait with a cane, before stopping to rest. Cannot use a non-powered wheelchair due to arm pain s/p breast surgery for breast cancer. Review of Systems   Constitutional: Negative for appetite change, diaphoresis, fatigue and unexpected weight change. Eyes: Negative for visual disturbance. Respiratory: Positive for shortness of breath. Negative for cough, chest tightness and wheezing. Cardiovascular: Positive for chest pain and leg swelling. Negative for palpitations. Gastrointestinal: Negative for abdominal distention, abdominal pain, blood in stool, constipation, diarrhea, nausea, rectal pain and vomiting. Endocrine: Negative for polydipsia, polyphagia and polyuria. Genitourinary: Negative for decreased urine volume, dysuria and frequency. Musculoskeletal: Positive for arthralgias and gait problem. Negative for back pain, joint swelling and myalgias. Skin: Negative for rash and wound. Neurological: Positive for seizures. Negative for dizziness, weakness, light-headedness, numbness and headaches. Psychiatric/Behavioral: Negative for dysphoric mood and sleep disturbance. The patient is not nervous/anxious. Past Medical History, Past Surgery History, Allergies, Social History, and Family History were reviewed and updated. Patient Active Problem List   Diagnosis Code    Diabetes mellitus with neuropathy ([address] E11.40    HTN (hypertension) I10    Chest pain R07.9    Seizure disorder ([address] G40.909    Hypercholesteremia E78.00    History of CVA (cerebrovascular accident) Z80.78    Hearing impairment H91.90    Gastroesophageal reflux disease without esophagitis K21.9    Breast cancer (HCC)-right inferior, 2.2cm,2/18 nodes, + - -, C50.919    Altered mental status R41.82    Type 2 diabetes mellitus with diabetic neuropathy, with long-term current use of insulin (HCC) E11.40, Z79.4    Pulmonary nodule, right R91.1    History of breast cancer Z85.3    Type 2 diabetes mellitus with nephropathy (HCC) E11.21    Severe obesity (BMI 35.0-39. 9) with comorbidity ([address] E66.01    PE (pulmonary thromboembolism) ([address] I26.99     Past Medical History:   Diagnosis Date    Arthritis     Breast CA ([address]     Breast CA ([address] 2016    DDD (degenerative disc disease), cervical     Diabetes ([address]     DJD (degenerative joint disease) of cervical spine     Hypercholesteremia     Hypertension     Menopause     PE (pulmonary thromboembolism) ([facility] 2019    Psychiatric disorder     DEPRESSION    PUD (peptic ulcer disease)     S/P cardiac cath 02/2015    No angiographic evidence of CAD    Seizures ([facility]     LAST SEIZURE ABOUT 2 MONTHS AGO (NOV 2015)    Stroke ([facility]     x2 with left side deficit     Patient Care Team:  Elva Pozo NP as PCP - General (Nurse Practitioner)  Elva Pozo NP as PCP - [facility] Empaneled Provider  Denisha Woody NP (Nurse Practitioner)  Марина Castellon MD (General Surgery)  Ani Esteves, GILBERT as Nurse Navigator (Oncology)  Gloria Garay MD (Pulmonary Disease)  Kannan Lam MD (Surgery)  Shanae Ba MD (Breast Surgery)    Past Surgical History:   Procedure Laterality Date    HX BREAST LUMPECTOMY Right 1/12/2016    Dr. Amari Reilly Partial Mastectomy w./ axillary lymphadenectomy    HX HEENT      TONSILLECTOMY    HX MASTECTOMY Right 1/26/2016    Dr. Amari Reilly Repeat Partial Mastectomy for positive margins    HX ORTHOPAEDIC      left arm surg    IR MEDIPORT       Family History   Problem Relation Age of Onset    Cancer Father [de-identified]        colon    Hypertension Mother     Heart Disease Mother     Diabetes Mother     Breast Cancer Maternal Aunt     Breast Cancer Other     Breast Cancer Other      Social History     Tobacco Use    Smoking status: Never Smoker    Smokeless tobacco: Never Used   Substance Use Topics    Alcohol use: No    Drug use: No     Allergies   Allergen Reactions    Metformin Other (comments)     Patient states that she is not allergic to metformin. Current Outpatient Medications on File Prior to Visit   Medication Sig Dispense Refill    therapeutic multivitamin (THERAGRAN) tablet Take 1 Tab by mouth daily.  capsaicin 0.075 % topical cream Apply  to affected area three (3) times daily as needed for Pain.  60 g 6    glucose blood VI test strips (WAVESENSE PRESTO) strip  Strip 0    divalproex ER (DEPAKOTE ER) 500 mg ER tablet Take 1 Tab by mouth two (2) times a day. (Patient taking differently: Take 750 mg by mouth two (2) times a day.) 60 Tab 3    letrozole (FEMARA) 2.5 mg tablet Take 2.5 mg by mouth daily.  SONAFINE emul topical        No current facility-administered medications on file prior to visit. Health Maintenance Due   Topic Date Due    Shingrix Vaccine Age 49> (1 of 2) 08/09/2002    Foot Exam Q1  11/23/2016    Medicare Yearly Exam  04/18/2019    Pneumococcal 65+ years (2 of 2 - PPSV23) 02/04/2020         Objective     Visit Vitals  /79   Pulse 85   Temp 98.4 °F (36.9 °C) (Oral)   Resp 18   Ht 5' 5\" (1.651 m)   Wt 242 lb (109.8 kg)   SpO2 99%   BMI 40.27 kg/m²     No LMP recorded. Patient is postmenopausal.    Physical Exam  Constitutional:       General: She is not in acute distress. Appearance: Normal appearance. She is well-developed. She is not diaphoretic. Eyes:      Conjunctiva/sclera: Conjunctivae normal.      Pupils: Pupils are equal, round, and reactive to light. Neck:      Vascular: No carotid bruit. Cardiovascular:      Rate and Rhythm: Normal rate and regular rhythm. Pulses: Normal pulses. Heart sounds: Murmur present. Systolic murmur present with a grade of 3/6. Pulmonary:      Effort: Pulmonary effort is normal. No respiratory distress. Breath sounds: Normal breath sounds. Abdominal:      General: Bowel sounds are normal. There is no distension. Palpations: Abdomen is soft. There is no mass. Tenderness: There is no abdominal tenderness. Musculoskeletal:      Right lower leg: Edema present. Left lower leg: Edema present. Skin:     General: Skin is warm and dry. Findings: No rash. Neurological:      Mental Status: She is alert and oriented to person, place, and time. Psychiatric:      Comments: Poor memory           LABS     TESTS      Assessment and Plan     1.  Type 2 diabetes mellitus with diabetic neuropathy, with long-term current use of insulin [facility])  Labs today. Refilled insulin. - Insulin Needles, Disposable, (TRUEplus Pen Needle) 31 gauge x 5/16\" ndle; Use as directed 4 times daily  Dispense: 100 Pen Needle; Refill: 11  - insulin aspart protamine/insulin aspart (NovoLOG Mix 70-30FlexPen U-100) 100 unit/mL (70-30) inpn; INJECT 60 UNITS SUBCUTANEOUSLY TWICE DAILY 30 MINUTES BEFORE BREAKFAST AND DINNER  Dispense: 30 mL; Refill: 5  - REFERRAL TO PODIATRY  - CBC WITH AUTOMATED DIFF; Future  - METABOLIC PANEL, COMPREHENSIVE; Future  - HEMOGLOBIN A1C W/O EAG; Future  - URINALYSIS W/ RFLX MICROSCOPIC; Future  - MICROALBUMIN, UR, RAND W/ MICROALB/CREAT RATIO; Future    2. Essential hypertension  Continue current medications. - losartan (COZAAR) 50 mg tablet; Take 1 Tab by mouth two (2) times a day. Dispense: 60 Tab; Refill: 2  - hydroCHLOROthiazide (HYDRODIURIL) 25 mg tablet; Take 1 Tab by mouth daily. Dispense: 30 Tab; Refill: 2  - carvediloL (COREG) 12.5 mg tablet; Take 1 Tab by mouth two (2) times daily (with meals). Dispense: 60 Tab; Refill: 2  - amLODIPine (NORVASC) 5 mg tablet; Take 1 Tab by mouth nightly. Dispense: 30 Tab; Refill: 2  - isosorbide mononitrate ER (Imdur) 30 mg tablet; Take 1 Tab by mouth daily. Dispense: 90 Tab; Refill: 1    3. Leg swelling  Start Lasix. - furosemide (LASIX) 20 mg tablet; One po daily  Dispense: 30 Tab; Refill: 2  - REFERRAL TO CARDIOLOGY    4. Gastritis without bleeding, unspecified chronicity, unspecified gastritis type  Well-controlled. Continue pantoprazole  - pantoprazole (PROTONIX) 40 mg tablet; Take 1 Tab by mouth daily. Dispense: 90 Tab; Refill: 1    5. Hypercholesteremia  Continue  - atorvastatin (LIPITOR) 40 mg tablet; Take 1 Tab by mouth daily. Dispense: 30 Tab; Refill: 2    6. Heart murmur  Newly identified. Postpone surgery until after evaluation.   - ECHO ADULT COMPLETE; Future  - XR CHEST PA LAT; Future  - ECHO ADULT COMPLETE; Future  - REFERRAL TO CARDIOLOGY    7.  Shortness of breath  - ECHO ADULT COMPLETE; Future  - XR CHEST PA LAT; Future  - ECHO ADULT COMPLETE; Future  - REFERRAL TO CARDIOLOGY    8. Chest pain, unspecified type  - ECHO ADULT COMPLETE; Future  - XR CHEST PA LAT; Future  - ECHO ADULT COMPLETE; Future  - REFERRAL TO CARDIOLOGY    9. History of CVA (cerebrovascular accident)  - REFERRAL TO CARDIOLOGY      Follow-up and Dispositions    · Return in about 2 weeks (around 6/9/2020) for in person, Medication follow up, 30 min appt, Medicare annual wellness visit. Risks, benefits, and alternatives of the medications and treatment plan prescribed today were discussed, and patient expressed understanding. Printed after visit summary was given to patient and reviewed. All patient questions and concerns were addressed. Plan follow-up as discussed or as needed if any worsening symptoms or change in condition.            Signed electronically by Yazan Puga DNP, FNP-BC Attending Attestation (For Attendings USE Only)...

## 2020-07-07 NOTE — ED ADULT TRIAGE NOTE - CHIEF COMPLAINT QUOTE
Pt arrives to ED c/o RUQ pain with radiation to back.  Pt reports visiting the ED on 7/1/20 and received diagnosis of gallstones. Pt also reports N/V.  Pt is [place] Islander speaking and agrees to translation services.

## 2020-07-07 NOTE — ED CDU PROVIDER INITIAL DAY NOTE - PHYSICAL EXAMINATION
General: Patient in no apparent distress, AAO x 3  Skin: Dry and intact. no rash  HEENT: Head atraumatic. Oral mucosa moist. No pharyngeal exudates or tonsillar enlargement  Eyes: Conjunctiva normal  Cardiac: Regular rhythm and rate. No pretibial edema b/l  Respiratory: Lungs clear b/l and symmetric. No respiratory distress. Able to speak in complete sentences.  Gastrointestinal: Abdomen soft, nondistended, +tenderness to RUQ, +R CVA tenderness, NO LEFT CVA TENDERNESS  Musculoskeletal: Moves all extremities spontaneously  Neurological: alert and oriented to person, place, and time  Psychiatric: Cooperative

## 2020-07-07 NOTE — ED CDU PROVIDER INITIAL DAY NOTE - ATTENDING CONTRIBUTION TO CARE
30 yo female with h/o uncomplicated  2 weeks ago, gallstones, pyelonephritis 2 months ago while pregnant requiring admission, p/w 1 week of RUQ pain radiating to back, worse after eating food, associated with nausea and vomiting.  No SOB, coughing, fevers, dysuria, or other complaints.  Patient denies any left sided abdominal, flank, or back pains. Says she has had these same symptoms while she was pregnant and hospitalized prior to her  and was told it was due to her gallstones.      In ED patient received IVF, morphine IV x 2, and dilaudid with improved but persistent pain.  An abdominal ultrasound and CT abdomen/pelvis was performed which showed gallstones without cholecystitis, a 9mm obstructing renal stone in the left distal ureter at the pelvic brim with mild left hydronephrosis and possible left sided pyelonephritis.  UA showed 10WBCs and negative nitrites. Urology was consulted to evaluate for possible surgical intervention given large size of obstructing stone.  Per urology reccs, no acute surgical intervention needed at this time and pain patient experiencing likely due to gallbladder disease and gallbladder on right and obstructing renal stone on left where patient has no pain.     Patient dispo-ed to CDU for pain management, HIDA scan, IV antibiotics, urine culture to follow, and general observation if worsening or new pain relating to renal stone.    General: Patient in no apparent distress, AAO x 3  Skin: Dry and intact. no rash  HEENT: Head atraumatic. Oral mucosa moist. No pharyngeal exudates or tonsillar enlargement  Eyes: Conjunctiva normal  Cardiac: Regular rhythm and rate. No pretibial edema b/l  Respiratory: Lungs clear b/l and symmetric. No respiratory distress. Able to speak in complete sentences.  Gastrointestinal: Abdomen soft, nondistended, +tenderness to RUQ, +R CVA tenderness, NO LEFT CVA TENDERNESS  Musculoskeletal: Moves all extremities spontaneously  Neurological: alert and oriented to person, place, and time  Psychiatric: Cooperative

## 2020-07-08 ENCOUNTER — TRANSCRIPTION ENCOUNTER (OUTPATIENT)
Age: 30
End: 2020-07-08

## 2020-07-08 DIAGNOSIS — K81.9 CHOLECYSTITIS, UNSPECIFIED: ICD-10-CM

## 2020-07-08 LAB
ALBUMIN SERPL ELPH-MCNC: 3.7 G/DL — SIGNIFICANT CHANGE UP (ref 3.3–5)
ALP SERPL-CCNC: 151 U/L — HIGH (ref 40–120)
ALT FLD-CCNC: 31 U/L — SIGNIFICANT CHANGE UP (ref 4–33)
ANION GAP SERPL CALC-SCNC: 9 MMO/L — SIGNIFICANT CHANGE UP (ref 7–14)
APTT BLD: 31.6 SEC — SIGNIFICANT CHANGE UP (ref 27–36.3)
AST SERPL-CCNC: 20 U/L — SIGNIFICANT CHANGE UP (ref 4–32)
BASE EXCESS BLDV CALC-SCNC: 0.3 MMOL/L — SIGNIFICANT CHANGE UP
BASOPHILS # BLD AUTO: 0.02 K/UL — SIGNIFICANT CHANGE UP (ref 0–0.2)
BASOPHILS NFR BLD AUTO: 0.3 % — SIGNIFICANT CHANGE UP (ref 0–2)
BILIRUB SERPL-MCNC: 0.2 MG/DL — SIGNIFICANT CHANGE UP (ref 0.2–1.2)
BLD GP AB SCN SERPL QL: NEGATIVE — SIGNIFICANT CHANGE UP
BLOOD GAS VENOUS - CREATININE: 0.7 MG/DL — SIGNIFICANT CHANGE UP (ref 0.5–1.3)
BLOOD GAS VENOUS - FIO2: 21 — SIGNIFICANT CHANGE UP
BUN SERPL-MCNC: 8 MG/DL — SIGNIFICANT CHANGE UP (ref 7–23)
CALCIUM SERPL-MCNC: 9 MG/DL — SIGNIFICANT CHANGE UP (ref 8.4–10.5)
CHLORIDE BLDV-SCNC: 108 MMOL/L — SIGNIFICANT CHANGE UP (ref 96–108)
CHLORIDE SERPL-SCNC: 106 MMOL/L — SIGNIFICANT CHANGE UP (ref 98–107)
CO2 SERPL-SCNC: 25 MMOL/L — SIGNIFICANT CHANGE UP (ref 22–31)
CREAT SERPL-MCNC: 0.7 MG/DL — SIGNIFICANT CHANGE UP (ref 0.5–1.3)
CULTURE RESULTS: SIGNIFICANT CHANGE UP
EOSINOPHIL # BLD AUTO: 0.2 K/UL — SIGNIFICANT CHANGE UP (ref 0–0.5)
EOSINOPHIL NFR BLD AUTO: 3.2 % — SIGNIFICANT CHANGE UP (ref 0–6)
GAS PNL BLDV: 143 MMOL/L — SIGNIFICANT CHANGE UP (ref 136–146)
GLUCOSE BLDV-MCNC: 83 MG/DL — SIGNIFICANT CHANGE UP (ref 70–99)
GLUCOSE SERPL-MCNC: 92 MG/DL — SIGNIFICANT CHANGE UP (ref 70–99)
HCG UR-SCNC: NEGATIVE — SIGNIFICANT CHANGE UP
HCO3 BLDV-SCNC: 23 MMOL/L — SIGNIFICANT CHANGE UP (ref 20–27)
HCT VFR BLD CALC: 31.4 % — LOW (ref 34.5–45)
HCT VFR BLDV CALC: 32.9 % — LOW (ref 34.5–45)
HGB BLD-MCNC: 10 G/DL — LOW (ref 11.5–15.5)
HGB BLDV-MCNC: 10.7 G/DL — LOW (ref 11.5–15.5)
IMM GRANULOCYTES NFR BLD AUTO: 0.2 % — SIGNIFICANT CHANGE UP (ref 0–1.5)
INR BLD: 1.08 — SIGNIFICANT CHANGE UP (ref 0.88–1.17)
LACTATE BLDV-MCNC: 0.9 MMOL/L — SIGNIFICANT CHANGE UP (ref 0.5–2)
LYMPHOCYTES # BLD AUTO: 2.27 K/UL — SIGNIFICANT CHANGE UP (ref 1–3.3)
LYMPHOCYTES # BLD AUTO: 36.5 % — SIGNIFICANT CHANGE UP (ref 13–44)
MCHC RBC-ENTMCNC: 29.2 PG — SIGNIFICANT CHANGE UP (ref 27–34)
MCHC RBC-ENTMCNC: 31.8 % — LOW (ref 32–36)
MCV RBC AUTO: 91.5 FL — SIGNIFICANT CHANGE UP (ref 80–100)
MONOCYTES # BLD AUTO: 0.22 K/UL — SIGNIFICANT CHANGE UP (ref 0–0.9)
MONOCYTES NFR BLD AUTO: 3.5 % — SIGNIFICANT CHANGE UP (ref 2–14)
NEUTROPHILS # BLD AUTO: 3.5 K/UL — SIGNIFICANT CHANGE UP (ref 1.8–7.4)
NEUTROPHILS NFR BLD AUTO: 56.3 % — SIGNIFICANT CHANGE UP (ref 43–77)
NRBC # FLD: 0 K/UL — SIGNIFICANT CHANGE UP (ref 0–0)
PCO2 BLDV: 45 MMHG — SIGNIFICANT CHANGE UP (ref 41–51)
PH BLDV: 7.37 PH — SIGNIFICANT CHANGE UP (ref 7.32–7.43)
PLATELET # BLD AUTO: 273 K/UL — SIGNIFICANT CHANGE UP (ref 150–400)
PMV BLD: 11.1 FL — SIGNIFICANT CHANGE UP (ref 7–13)
PO2 BLDV: < 24 MMHG — LOW (ref 35–40)
POTASSIUM BLDV-SCNC: 3.9 MMOL/L — SIGNIFICANT CHANGE UP (ref 3.4–4.5)
POTASSIUM SERPL-MCNC: 4 MMOL/L — SIGNIFICANT CHANGE UP (ref 3.5–5.3)
POTASSIUM SERPL-SCNC: 4 MMOL/L — SIGNIFICANT CHANGE UP (ref 3.5–5.3)
PROT SERPL-MCNC: 6.5 G/DL — SIGNIFICANT CHANGE UP (ref 6–8.3)
PROTHROM AB SERPL-ACNC: 12.4 SEC — SIGNIFICANT CHANGE UP (ref 9.8–13.1)
RBC # BLD: 3.43 M/UL — LOW (ref 3.8–5.2)
RBC # FLD: 13.6 % — SIGNIFICANT CHANGE UP (ref 10.3–14.5)
RH IG SCN BLD-IMP: POSITIVE — SIGNIFICANT CHANGE UP
SAO2 % BLDV: 27.6 % — LOW (ref 60–85)
SARS-COV-2 IGG SERPL QL IA: NEGATIVE — SIGNIFICANT CHANGE UP
SARS-COV-2 IGM SERPL IA-ACNC: <3.8 AU/ML — SIGNIFICANT CHANGE UP
SODIUM SERPL-SCNC: 140 MMOL/L — SIGNIFICANT CHANGE UP (ref 135–145)
SP GR UR: 1.01 — SIGNIFICANT CHANGE UP (ref 1–1.04)
SPECIMEN SOURCE: SIGNIFICANT CHANGE UP
WBC # BLD: 6.22 K/UL — SIGNIFICANT CHANGE UP (ref 3.8–10.5)
WBC # FLD AUTO: 6.22 K/UL — SIGNIFICANT CHANGE UP (ref 3.8–10.5)

## 2020-07-08 PROCEDURE — 47562 LAPAROSCOPIC CHOLECYSTECTOMY: CPT | Mod: GC

## 2020-07-08 RX ADMIN — Medication 100 GRAM(S): at 17:06

## 2020-07-08 RX ADMIN — Medication 650 MILLIGRAM(S): at 03:05

## 2020-07-08 RX ADMIN — Medication 100 GRAM(S): at 07:00

## 2020-07-08 RX ADMIN — SODIUM CHLORIDE 125 MILLILITER(S): 9 INJECTION, SOLUTION INTRAVENOUS at 02:35

## 2020-07-08 RX ADMIN — Medication 650 MILLIGRAM(S): at 10:04

## 2020-07-08 RX ADMIN — ENOXAPARIN SODIUM 40 MILLIGRAM(S): 100 INJECTION SUBCUTANEOUS at 16:03

## 2020-07-08 NOTE — PROGRESS NOTE ADULT - ASSESSMENT
29 Yoruba-speaking female with hx of pyelonephritis (2020),  (2 weeks ago), and known gallstone (last seen in ED ) presents with worsening RUQ pain. US and CT show cholelithiasis without acute cholecystitis. Patient is hemodynamically stable    Plan:  Lap naima today  POC  NPO  IV Cefotetan  F/u Covid  Pain Control

## 2020-07-08 NOTE — CHART NOTE - NSCHARTNOTEFT_GEN_A_CORE
CAPRINI SCORE [CLOT]    AGE RELATED RISK FACTORS                                                       MOBILITY RELATED FACTORS  [ ] Age 41-60 years                                            (1 Point)                  [ ] Bed rest                                                        (1 Point)  [ ] Age: 61-74 years                                           (2 Points)                 [ ] Plaster cast                                                   (2 Points)  [ ] Age= 75 years                                              (3 Points)                 [ ] Bed bound for more than 72 hours                 (2 Points)    DISEASE RELATED RISK FACTORS                                               GENDER SPECIFIC FACTORS  [ ] Edema in the lower extremities                       (1 Point)                  [ ] Pregnancy                                                     (1 Point)  [ ] Varicose veins                                               (1 Point)                  [X ] Post-partum < 6 weeks                                   (1 Point)             [ X] BMI > 25 Kg/m2                                            (1 Point)                  [ ] Hormonal therapy  or oral contraception          (1 Point)                 [ ] Sepsis (in the previous month)                        (1 Point)                  [ ] History of pregnancy complications                 (1 point)  [ ] Pneumonia or serious lung disease                                               [ ] Unexplained or recurrent                     (1 Point)           (in the previous month)                               (1 Point)  [ ] Abnormal pulmonary function test                     (1 Point)                 SURGERY RELATED RISK FACTORS  [ ] Acute myocardial infarction                              (1 Point)                 [ ]  Section                                             (1 Point)  [ ] Congestive heart failure (in the previous month)  (1 Point)               [ ] Minor surgery                                                  (1 Point)   [ ] Inflammatory bowel disease                             (1 Point)                 [ ] Arthroscopic surgery                                        (2 Points)  [ ] Central venous access                                      (2 Points)               [ ] General surgery lasting more than 45 minutes   (2 Points)       [ ] Stroke (in the previous month)                          (5 Points)               [ ] Elective arthroplasty                                         (5 Points)                                                                                                                                               HEMATOLOGY RELATED FACTORS                                                 TRAUMA RELATED RISK FACTORS  [ ] Prior episodes of VTE                                     (3 Points)                [ ] Fracture of the hip, pelvis, or leg                       (5 Points)  [ ] Positive family history for VTE                         (3 Points)                 [ ] Acute spinal cord injury (in the previous month)  (5 Points)  [ ] Prothrombin 01689 A                                     (3 Points)                 [ ] Paralysis  (less than 1 month)                             (5 Points)  [ ] Factor V Leiden                                             (3 Points)                  [ ] Multiple Trauma within 1 month                        (5 Points)  [ ] Lupus anticoagulants                                     (3 Points)                                                           [ ] Anticardiolipin antibodies                               (3 Points)                                                       [ ] High homocysteine in the blood                      (3 Points)                    OTHER PRESENT OR PAST HISTORY                    [ ] Other congenital or acquired thrombophilia      (3 Points)               [ ] present or previous malignancy                    (2 points)   [ ] Heparin induced thrombocytopenia                  (3 Points)                                          Total Score [      2    ]    Caprini Score 0 - 2:  Low Risk, No VTE Prophylaxis required for most patients, encourage ambulation  Caprini Score 3 - 6:  At Risk, pharmacologic VTE prophylaxis is indicated for most patients (in the absence of a contraindication)  Caprini Score Greater than or = 7:  High Risk, pharmacologic VTE prophylaxis is indicated for most patients (in the absence of a contraindication)

## 2020-07-08 NOTE — PROGRESS NOTE ADULT - ASSESSMENT
29yoF with RUQ and 7mm distal L ureteral stone    -Patient has no pain on L, only has RUQ pain.  Patient's distal L ureteral stone is highly unlikely to be the cause of her RUQ pain  -Can follow up as outpatient for surgical scheduling if her stone does not pass  -No indication for ureteral stent at this time  -Please send urine culture    -Urology will sign off at this time  -Follow up as outpatient with Dr. Alisia Norwood [place] for Urology  [address] [place] [address]   [phone]

## 2020-07-08 NOTE — PROGRESS NOTE ADULT - SUBJECTIVE AND OBJECTIVE BOX
Subjective    Feeling well, no left sided pain.    Objective    Vital signs  T(F): , Max: 99.1 (07-07-20 @ 21:54)  HR: 69 (07-08-20 @ 06:38)  BP: 104/69 (07-08-20 @ 06:38)  SpO2: 100% (07-08-20 @ 06:38)  Wt(kg): --    Output       Physical Exam  Gen NAD  Abd soft   no cvat    Labs      07-07 @ 08:30    WBC 10.88 / Hct 35.9  / SCr 0.68       Urine Cx: p  Blood Cx: p

## 2020-07-08 NOTE — PROGRESS NOTE ADULT - SUBJECTIVE AND OBJECTIVE BOX
Morning Surgical Progress Note  Patient is a 29y old  Female who presents with a chief complaint of     SUBJECTIVE: Patient seen and examined at bedside with surgical team, patient complaining of mild RUQ pain. Denies fever/chills/n/v/d/c.           Vital Signs Last 24 Hrs  T(C): 36.7 (2020 06:38), Max: 37.3 (2020 21:54)  T(F): 98.1 (2020 06:38), Max: 99.1 (2020 21:54)  HR: 69 (2020 06:38) (58 - 69)  BP: 104/69 (2020 06:38) (104/69 - 142/62)  BP(mean): --  RR: 16 (2020 06:38) (16 - 18)  SpO2: 100% (2020 06:38) (98% - 100%)I&O's Detail    Medications  MEDICATIONS  (STANDING):  acetaminophen   Tablet .. 650 milliGRAM(s) Oral every 6 hours  cefoTEtan  IVPB 2 Gram(s) IV Intermittent every 12 hours  enoxaparin Injectable 40 milliGRAM(s) SubCutaneous daily  lactated ringers. 1000 milliLiter(s) (125 mL/Hr) IV Continuous <Continuous>    MEDICATIONS  (PRN):  HYDROmorphone  Injectable 0.5 milliGRAM(s) IV Push every 4 hours PRN Moderate Pain (4 - 6)  HYDROmorphone  Injectable 1 milliGRAM(s) IV Push every 4 hours PRN Severe Pain (7 - 10)      Physical Exam:  General: AAOx3, sitting in recliner, NAD  Resp: nonlabored breathing  Abd: soft, NT, ND, no rebound or guarding. R CVA tenderness; neg L CVA tenderness    LABS:                        10.0   6.22  )-----------( 273      ( 2020 06:15 )             31.4     07-08    140  |  106  |  8   ----------------------------<  92  4.0   |  25  |  0.70    Ca    9.0      2020 06:15    TPro  6.5  /  Alb  3.7  /  TBili  0.2  /  DBili  x   /  AST  20  /  ALT  31  /  AlkPhos  151<H>  07-08      LIVER FUNCTIONS - ( 2020 06:15 )  Alb: 3.7 g/dL / Pro: 6.5 g/dL / ALK PHOS: 151 u/L / ALT: 31 u/L / AST: 20 u/L / GGT: x           Urinalysis Basic - ( 2020 09:05 )    Color: LIGHT YELLOW / Appearance: CLEAR / S.027 / pH: 5.5  Gluc: NEGATIVE / Ketone: NEGATIVE  / Bili: NEGATIVE / Urobili: NORMAL   Blood: TRACE / Protein: 10 / Nitrite: NEGATIVE   Leuk Esterase: SMALL / RBC: 3-5 / WBC 6-10   Sq Epi: OCC / Non Sq Epi: x / Bacteria: NEGATIVE

## 2020-07-08 NOTE — CONSULT NOTE PEDS - SUBJECTIVE AND OBJECTIVE BOX
Asked to talk with Ms. Yuan as she is pregnant with a fetus with hydrops of unknown etiology currently at 28 weeks.  Fetal ECHO shows hypokinesia but no structural abnormality limited by poor function. Vial causes have been ruled out. By report anemia does nto not appear to be present as MCA dopplers ar normal.   Mother is aware that the fetus may not survive in utero, during labor/delivery, or after birth.  Survival of a normal 28 week infant with no risk factors and mother stabilized with betamethasone and MS is around 95%.  We discussed the problems associated with this clinical presentation decreasing the possibility of intact survival.  This infant is at risk for RDS nad pulmonary hypoplasia if the pleural effusion impact lung development.  We discussed the need for intubation, mechanical ventilation, use of CPAP, supplemental oxygen, and surfactant administration. We discussed the concern for cardiac dysfunction and that cardiology would be informed of the birth and perform an ECHO after delivery. We talked about placement of central lines and the need for TPN initially and the value of colostrum and breat milk. I described the events at the time of delivery with resuscitation in the NSU.   We will only know the significance of hydrops on the development of this fetus after birth. She understands that this infant may not survive after delivery but she desires that we resuscitate this infant. She has completed a course of betamethasone and is now being monitored.   Plan of care and resuscitation also discussed with Dr. Solis.

## 2020-07-09 ENCOUNTER — RESULT REVIEW (OUTPATIENT)
Age: 30
End: 2020-07-09

## 2020-07-09 DIAGNOSIS — K81.0 ACUTE CHOLECYSTITIS: ICD-10-CM

## 2020-07-09 LAB
ALBUMIN SERPL ELPH-MCNC: 3.6 G/DL — SIGNIFICANT CHANGE UP (ref 3.3–5)
ALP SERPL-CCNC: 136 U/L — HIGH (ref 40–120)
ALT FLD-CCNC: 26 U/L — SIGNIFICANT CHANGE UP (ref 4–33)
ANION GAP SERPL CALC-SCNC: 14 MMO/L — SIGNIFICANT CHANGE UP (ref 7–14)
AST SERPL-CCNC: 18 U/L — SIGNIFICANT CHANGE UP (ref 4–32)
BILIRUB SERPL-MCNC: 0.2 MG/DL — SIGNIFICANT CHANGE UP (ref 0.2–1.2)
BUN SERPL-MCNC: 8 MG/DL — SIGNIFICANT CHANGE UP (ref 7–23)
CALCIUM SERPL-MCNC: 9.2 MG/DL — SIGNIFICANT CHANGE UP (ref 8.4–10.5)
CHLORIDE SERPL-SCNC: 102 MMOL/L — SIGNIFICANT CHANGE UP (ref 98–107)
CO2 SERPL-SCNC: 23 MMOL/L — SIGNIFICANT CHANGE UP (ref 22–31)
CREAT SERPL-MCNC: 0.62 MG/DL — SIGNIFICANT CHANGE UP (ref 0.5–1.3)
CULTURE RESULTS: NO GROWTH — SIGNIFICANT CHANGE UP
GLUCOSE SERPL-MCNC: 77 MG/DL — SIGNIFICANT CHANGE UP (ref 70–99)
HCT VFR BLD CALC: 33.3 % — LOW (ref 34.5–45)
HGB BLD-MCNC: 11.1 G/DL — LOW (ref 11.5–15.5)
MCHC RBC-ENTMCNC: 30.3 PG — SIGNIFICANT CHANGE UP (ref 27–34)
MCHC RBC-ENTMCNC: 33.3 % — SIGNIFICANT CHANGE UP (ref 32–36)
MCV RBC AUTO: 91 FL — SIGNIFICANT CHANGE UP (ref 80–100)
NRBC # FLD: 0 K/UL — SIGNIFICANT CHANGE UP (ref 0–0)
PLATELET # BLD AUTO: 249 K/UL — SIGNIFICANT CHANGE UP (ref 150–400)
PMV BLD: 11.7 FL — SIGNIFICANT CHANGE UP (ref 7–13)
POTASSIUM SERPL-MCNC: 3.7 MMOL/L — SIGNIFICANT CHANGE UP (ref 3.5–5.3)
POTASSIUM SERPL-SCNC: 3.7 MMOL/L — SIGNIFICANT CHANGE UP (ref 3.5–5.3)
PROT SERPL-MCNC: 6.4 G/DL — SIGNIFICANT CHANGE UP (ref 6–8.3)
RBC # BLD: 3.66 M/UL — LOW (ref 3.8–5.2)
RBC # FLD: 13.3 % — SIGNIFICANT CHANGE UP (ref 10.3–14.5)
SODIUM SERPL-SCNC: 139 MMOL/L — SIGNIFICANT CHANGE UP (ref 135–145)
SPECIMEN SOURCE: SIGNIFICANT CHANGE UP
WBC # BLD: 6.27 K/UL — SIGNIFICANT CHANGE UP (ref 3.8–10.5)
WBC # FLD AUTO: 6.27 K/UL — SIGNIFICANT CHANGE UP (ref 3.8–10.5)

## 2020-07-09 PROCEDURE — 88304 TISSUE EXAM BY PATHOLOGIST: CPT | Mod: 26

## 2020-07-09 RX ORDER — OXYCODONE HYDROCHLORIDE 5 MG/1
5 TABLET ORAL EVERY 4 HOURS
Refills: 0 | Status: DISCONTINUED | OUTPATIENT
Start: 2020-07-09 | End: 2020-07-10

## 2020-07-09 RX ORDER — HYDROMORPHONE HYDROCHLORIDE 2 MG/ML
0.5 INJECTION INTRAMUSCULAR; INTRAVENOUS; SUBCUTANEOUS
Refills: 0 | Status: DISCONTINUED | OUTPATIENT
Start: 2020-07-09 | End: 2020-07-10

## 2020-07-09 RX ORDER — POTASSIUM CHLORIDE 20 MEQ
40 PACKET (EA) ORAL ONCE
Refills: 0 | Status: DISCONTINUED | OUTPATIENT
Start: 2020-07-09 | End: 2020-07-09

## 2020-07-09 RX ORDER — ONDANSETRON 8 MG/1
4 TABLET, FILM COATED ORAL ONCE
Refills: 0 | Status: DISCONTINUED | OUTPATIENT
Start: 2020-07-09 | End: 2020-07-10

## 2020-07-09 RX ORDER — ACETAMINOPHEN 500 MG
975 TABLET ORAL EVERY 6 HOURS
Refills: 0 | Status: DISCONTINUED | OUTPATIENT
Start: 2020-07-09 | End: 2020-07-10

## 2020-07-09 RX ORDER — IBUPROFEN 200 MG
400 TABLET ORAL EVERY 6 HOURS
Refills: 0 | Status: DISCONTINUED | OUTPATIENT
Start: 2020-07-09 | End: 2020-07-10

## 2020-07-09 RX ORDER — FENTANYL CITRATE 50 UG/ML
50 INJECTION INTRAVENOUS
Refills: 0 | Status: DISCONTINUED | OUTPATIENT
Start: 2020-07-09 | End: 2020-07-10

## 2020-07-09 RX ORDER — SODIUM CHLORIDE 9 MG/ML
1000 INJECTION, SOLUTION INTRAVENOUS
Refills: 0 | Status: DISCONTINUED | OUTPATIENT
Start: 2020-07-09 | End: 2020-07-10

## 2020-07-09 RX ADMIN — HYDROMORPHONE HYDROCHLORIDE 0.5 MILLIGRAM(S): 2 INJECTION INTRAMUSCULAR; INTRAVENOUS; SUBCUTANEOUS at 18:00

## 2020-07-09 RX ADMIN — HYDROMORPHONE HYDROCHLORIDE 0.5 MILLIGRAM(S): 2 INJECTION INTRAMUSCULAR; INTRAVENOUS; SUBCUTANEOUS at 17:45

## 2020-07-09 RX ADMIN — Medication 975 MILLIGRAM(S): at 22:09

## 2020-07-09 RX ADMIN — Medication 100 GRAM(S): at 06:02

## 2020-07-09 RX ADMIN — HYDROMORPHONE HYDROCHLORIDE 0.5 MILLIGRAM(S): 2 INJECTION INTRAMUSCULAR; INTRAVENOUS; SUBCUTANEOUS at 18:15

## 2020-07-09 RX ADMIN — OXYCODONE HYDROCHLORIDE 5 MILLIGRAM(S): 5 TABLET ORAL at 19:41

## 2020-07-09 RX ADMIN — Medication 400 MILLIGRAM(S): at 18:46

## 2020-07-09 NOTE — PROGRESS NOTE ADULT - ASSESSMENT
29 Mongolian-speaking female with hx of pyelonephritis (2020),  (2 weeks ago), and known gallstone (last seen in ED ) presents with worsening RUQ pain. US and CT show cholelithiasis without acute cholecystitis. Patient is hemodynamically stable    Plan:  Lap naima today  NPO  IV Cefotetan  Pain Control    A team surgery, [id number]

## 2020-07-09 NOTE — PROGRESS NOTE ADULT - SUBJECTIVE AND OBJECTIVE BOX
SUBJECTIVE:   Pt seen and examined at bedside. Pt is laying in bed comfortably. No acute events overnight. Pt denies nausea, vomiting, diarrhea, fever.       Vital Signs Last 24 Hrs  T(C): 36.9 (2020 06:00), Max: 37.4 (2020 17:20)  T(F): 98.5 (2020 06:00), Max: 99.4 (2020 17:20)  HR: 57 (2020 06:00) (57 - 89)  BP: 122/75 (2020 06:00) (101/58 - 133/78)  BP(mean): --  RR: 16 (2020 06:00) (16 - 18)  SpO2: 98% (2020 06:00) (98% - 99%)      PHYSICAL EXAM:  Constitutional: Patient well nourish. well developed.  Neuro: AAOx3  Respiratory: breathing comfortably  Gastrointestinal: Abdomen soft, non distended,   Extremities:  No edema, no calf tenderrness,              I&O's Summary    2020 07:01  -  2020 07:00  --------------------------------------------------------  IN: 1600 mL / OUT: 300 mL / NET: 1300 mL      I&O's Detail    2020 07:01  -  2020 07:00  --------------------------------------------------------  IN:    IV PiggyBack: 50 mL    lactated ringers.: 1500 mL    Oral Fluid: 50 mL  Total IN: 1600 mL    OUT:    Voided: 300 mL  Total OUT: 300 mL    Total NET: 1300 mL          MEDICATIONS  (STANDING):  acetaminophen   Tablet .. 650 milliGRAM(s) Oral every 6 hours  cefoTEtan  IVPB 2 Gram(s) IV Intermittent every 12 hours  enoxaparin Injectable 40 milliGRAM(s) SubCutaneous daily  lactated ringers. 1000 milliLiter(s) (125 mL/Hr) IV Continuous <Continuous>    MEDICATIONS  (PRN):  HYDROmorphone  Injectable 0.5 milliGRAM(s) IV Push every 4 hours PRN Moderate Pain (4 - 6)  HYDROmorphone  Injectable 1 milliGRAM(s) IV Push every 4 hours PRN Severe Pain (7 - 10)      LABS:                        11.1   6.27  )-----------( 249      ( 2020 05:58 )             33.3         139  |  102  |  8   ----------------------------<  77  3.7   |  23  |  0.62    Ca    9.2      2020 05:58    TPro  6.4  /  Alb  3.6  /  TBili  0.2  /  DBili  x   /  AST  18  /  ALT  26  /  AlkPhos  136<H>      PT/INR - ( 2020 08:52 )   PT: 12.4 SEC;   INR: 1.08          PTT - ( 2020 08:52 )  PTT:31.6 SEC  Urinalysis Basic - ( 2020 09:05 )    Color: LIGHT YELLOW / Appearance: CLEAR / S.027 / pH: 5.5  Gluc: NEGATIVE / Ketone: NEGATIVE  / Bili: NEGATIVE / Urobili: NORMAL   Blood: TRACE / Protein: 10 / Nitrite: NEGATIVE   Leuk Esterase: SMALL / RBC: 3-5 / WBC 6-10   Sq Epi: OCC / Non Sq Epi: x / Bacteria: NEGATIVE

## 2020-07-09 NOTE — ASU PREOP CHECKLIST - TAMPON REMOVED
Mother brought in records from [facility] in [facility].    n/a

## 2020-07-10 ENCOUNTER — TRANSCRIPTION ENCOUNTER (OUTPATIENT)
Age: 30
End: 2020-07-10

## 2020-07-10 VITALS
RESPIRATION RATE: 18 BRPM | OXYGEN SATURATION: 98 % | DIASTOLIC BLOOD PRESSURE: 71 MMHG | TEMPERATURE: 99 F | SYSTOLIC BLOOD PRESSURE: 118 MMHG | HEART RATE: 89 BPM

## 2020-07-10 LAB
ALBUMIN SERPL ELPH-MCNC: 4 G/DL — SIGNIFICANT CHANGE UP (ref 3.3–5)
ALP SERPL-CCNC: 151 U/L — HIGH (ref 40–120)
ALT FLD-CCNC: 28 U/L — SIGNIFICANT CHANGE UP (ref 4–33)
ANION GAP SERPL CALC-SCNC: 12 MMO/L — SIGNIFICANT CHANGE UP (ref 7–14)
AST SERPL-CCNC: 22 U/L — SIGNIFICANT CHANGE UP (ref 4–32)
BILIRUB DIRECT SERPL-MCNC: < 0.2 MG/DL — SIGNIFICANT CHANGE UP (ref 0.1–0.2)
BILIRUB SERPL-MCNC: 0.2 MG/DL — SIGNIFICANT CHANGE UP (ref 0.2–1.2)
BUN SERPL-MCNC: 6 MG/DL — LOW (ref 7–23)
CALCIUM SERPL-MCNC: 9.7 MG/DL — SIGNIFICANT CHANGE UP (ref 8.4–10.5)
CHLORIDE SERPL-SCNC: 102 MMOL/L — SIGNIFICANT CHANGE UP (ref 98–107)
CO2 SERPL-SCNC: 24 MMOL/L — SIGNIFICANT CHANGE UP (ref 22–31)
CREAT SERPL-MCNC: 0.57 MG/DL — SIGNIFICANT CHANGE UP (ref 0.5–1.3)
GLUCOSE SERPL-MCNC: 118 MG/DL — HIGH (ref 70–99)
HCT VFR BLD CALC: 36.7 % — SIGNIFICANT CHANGE UP (ref 34.5–45)
HGB BLD-MCNC: 12.4 G/DL — SIGNIFICANT CHANGE UP (ref 11.5–15.5)
MAGNESIUM SERPL-MCNC: 1.9 MG/DL — SIGNIFICANT CHANGE UP (ref 1.6–2.6)
MCHC RBC-ENTMCNC: 30.2 PG — SIGNIFICANT CHANGE UP (ref 27–34)
MCHC RBC-ENTMCNC: 33.8 % — SIGNIFICANT CHANGE UP (ref 32–36)
MCV RBC AUTO: 89.3 FL — SIGNIFICANT CHANGE UP (ref 80–100)
NRBC # FLD: 0 K/UL — SIGNIFICANT CHANGE UP (ref 0–0)
PHOSPHATE SERPL-MCNC: 4.2 MG/DL — SIGNIFICANT CHANGE UP (ref 2.5–4.5)
PLATELET # BLD AUTO: 296 K/UL — SIGNIFICANT CHANGE UP (ref 150–400)
PMV BLD: 11.9 FL — SIGNIFICANT CHANGE UP (ref 7–13)
POTASSIUM SERPL-MCNC: 4.3 MMOL/L — SIGNIFICANT CHANGE UP (ref 3.5–5.3)
POTASSIUM SERPL-SCNC: 4.3 MMOL/L — SIGNIFICANT CHANGE UP (ref 3.5–5.3)
PROT SERPL-MCNC: 7.1 G/DL — SIGNIFICANT CHANGE UP (ref 6–8.3)
RBC # BLD: 4.11 M/UL — SIGNIFICANT CHANGE UP (ref 3.8–5.2)
RBC # FLD: 13.1 % — SIGNIFICANT CHANGE UP (ref 10.3–14.5)
SODIUM SERPL-SCNC: 138 MMOL/L — SIGNIFICANT CHANGE UP (ref 135–145)
WBC # BLD: 6.83 K/UL — SIGNIFICANT CHANGE UP (ref 3.8–10.5)
WBC # FLD AUTO: 6.83 K/UL — SIGNIFICANT CHANGE UP (ref 3.8–10.5)

## 2020-07-10 RX ORDER — OXYCODONE HYDROCHLORIDE 5 MG/1
1 TABLET ORAL
Qty: 12 | Refills: 0
Start: 2020-07-10

## 2020-07-10 RX ORDER — OXYCODONE HYDROCHLORIDE 5 MG/1
1 TABLET ORAL
Qty: 0 | Refills: 0 | DISCHARGE
Start: 2020-07-10

## 2020-07-10 RX ADMIN — Medication 975 MILLIGRAM(S): at 10:00

## 2020-07-10 RX ADMIN — Medication 400 MILLIGRAM(S): at 12:14

## 2020-07-10 RX ADMIN — Medication 400 MILLIGRAM(S): at 01:06

## 2020-07-10 RX ADMIN — ENOXAPARIN SODIUM 40 MILLIGRAM(S): 100 INJECTION SUBCUTANEOUS at 12:14

## 2020-07-10 RX ADMIN — Medication 975 MILLIGRAM(S): at 05:23

## 2020-07-10 NOTE — DISCHARGE NOTE NURSING/CASE MANAGEMENT/SOCIAL WORK - NSDCFUADDAPPT_GEN_ALL_CORE_FT
Please follow up with Dr Crump in 1-2 weeks  Call to schedule an appointment  [facility]  [address] [place] [address]   [phone]

## 2020-07-10 NOTE — DISCHARGE NOTE NURSING/CASE MANAGEMENT/SOCIAL WORK - NSDCPEFALRISK_GEN_ALL_CORE
Patient information on fall and injury prevention PAST SURGICAL HISTORY:  H/O dilation and curettage 2/2019 Benign polyp    S/P appendectomy 30 plus years    S/P knee replacement left 2000    S/P laparotomy due to adhesions, 30 years ago    S/P ORIF (open reduction internal fixation) fracture left hip 1962    Stented coronary artery 2004 X 2 STENTS

## 2020-07-10 NOTE — PROGRESS NOTE ADULT - SUBJECTIVE AND OBJECTIVE BOX
ANESTHESIA POSTOP CHECK    29y Female POSTOP DAY 1 S/P laparoscopic cholecystectomy    Vital Signs Last 24 Hrs  T(C): 37.1 (10 Jul 2020 10:30), Max: 37.1 (09 Jul 2020 21:15)  T(F): 98.8 (10 Jul 2020 10:30), Max: 98.8 (10 Jul 2020 10:30)  HR: 89 (10 Jul 2020 10:30) (59 - 94)  BP: 118/71 (10 Jul 2020 10:30) (108/76 - 143/68)  BP(mean): 88 (09 Jul 2020 18:15) (82 - 88)  RR: 18 (10 Jul 2020 10:30) (14 - 23)  SpO2: 98% (10 Jul 2020 10:30) (96% - 100%)  I&O's Summary    09 Jul 2020 07:01  -  10 Jul 2020 07:00  --------------------------------------------------------  IN: 2375 mL / OUT: 1200 mL / NET: 1175 mL    10 Jul 2020 07:01  -  10 Jul 2020 11:14  --------------------------------------------------------  IN: 0 mL / OUT: 500 mL / NET: -500 mL        [x ] NO APPARENT ANESTHESIA COMPLICATIONS      Comments: no current issues as per patient. patient does endorse some itching overnight which resolved.

## 2020-07-10 NOTE — DISCHARGE NOTE NURSING/CASE MANAGEMENT/SOCIAL WORK - PATIENT PORTAL LINK FT
You can access the FollowMyHealth Patient Portal offered by [facility] by registering at the following website: [email or web address]. By joining [facility]’s FollowMyHealth portal, you will also be able to view your health information using other applications (apps) compatible with our system.

## 2020-07-10 NOTE — PROGRESS NOTE ADULT - ASSESSMENT
29 Amharic-speaking female with hx of pyelonephritis (2020),  (2 weeks ago), and known gallstone (last seen in ED ) presents with worsening RUQ pain, US and CT show cholelithiasis without acute cholecystitis, and is now s/p POD#1 from lap naima, doing well.    Plan:  - Continue with pain control plan  - Regular diet -> if tolerates, can go home today  - DVT ppx: LNX, OOB  - F/u AM labs     Maria T Villela, PGY1  A team surgery, [id number]

## 2020-07-10 NOTE — PROGRESS NOTE ADULT - SUBJECTIVE AND OBJECTIVE BOX
Post Operative Note  Patient: JARED HORTA 29y (1990) Female   MRN: [id number]  Location: [place] [address] [address]  Visit: 07-07-20 Inpatient  Date: 07-10-20 @ 02:25    Procedure: S/P laparoscopic cholecystectomy.     Subjective:   Complained of transient "weakness and tingling" in her hands and feet that is resolving and getting better.  No chest pain. No shortness of breath. no nausea or vomiting.   Voiding. Passing gas. No BM.       Objective:  Vitals: T(F): 98.7 (07-09-20 @ 21:15), Max: 99.2 (07-09-20 @ 09:27)  HR: 82 (07-09-20 @ 21:15)  BP: 137/73 (07-09-20 @ 21:15) (108/76 - 143/68)  RR: 18 (07-09-20 @ 21:15)  SpO2: 98% (07-09-20 @ 21:15)  Vent Settings:     In:   07-08-20 @ 07:01  -  07-09-20 @ 07:00  --------------------------------------------------------  IN: 3150 mL    07-09-20 @ 07:01  -  07-10-20 @ 02:25  --------------------------------------------------------  IN: 1075 mL      IV Fluids: lactated ringers. 1000 milliLiter(s) (100 mL/Hr) IV Continuous <Continuous>      Out:   07-08-20 @ 07:01  -  07-09-20 @ 07:00  --------------------------------------------------------  OUT: 950 mL    07-09-20 @ 07:01  -  07-10-20 @ 02:25  --------------------------------------------------------  OUT: 700 mL      EBL:     Voided Urine:   07-08-20 @ 07:01  -  07-09-20 @ 07:00  --------------------------------------------------------  OUT: 950 mL    07-09-20 @ 07:01  -  07-10-20 @ 02:25  --------------------------------------------------------  OUT: 700 mL        Physical Examination:  General: NAD, resting comfortably in bed  HEENT: Normocephalic atraumatic  Respiratory: Nonlabored respirations, normal CW expansion.  Cardio: S1S2, regular rate and rhythm.  Abdomen: softly distended, appropriately tender, surgical incisions are c/d/i.   Vascular: extremities are warm and well perfused.     Imaging:  No post-op imaging studies    Assessment:  29yFemale patient S/P laparoscopic cholecystectomy for acute cholecystitis    Plan:  - LR @ 100cc  - Pain control PRN  - Diet: Regular  - Activity: OOB  - DVT ppx: Bradly Kline, PGY-1  Team A Surgery  [id number]

## 2020-07-10 NOTE — DISCHARGE NOTE PROVIDER - NSDCMRMEDTOKEN_GEN_ALL_CORE_FT
acetaminophen 325 mg oral tablet: 3 tab(s) orally   cephalexin 500 mg oral capsule: 1 cap(s) orally 4 times a day   folic acid 1 mg oral tablet: 1 tab(s) orally once a day  ibuprofen 600 mg oral tablet: 1 tab(s) orally every 6 hours  ibuprofen 600 mg oral tablet: 1 tab(s) orally every 6 hours, As Needed moderate pain, take with food.   oxyCODONE 5 mg oral tablet: 1 tab(s) orally every 4 hours, As needed, Moderate Pain (4 - 6)  PNV Prenatal oral tablet: 1 tab(s) orally once a day acetaminophen 325 mg oral tablet: 3 tab(s) orally   folic acid 1 mg oral tablet: 1 tab(s) orally once a day  ibuprofen 600 mg oral tablet: 1 tab(s) orally every 6 hours, As Needed moderate pain, take with food.   ibuprofen 600 mg oral tablet: 1 tab(s) orally every 6 hours, As Needed  oxyCODONE 5 mg oral tablet: 1 tab(s) orally every 4 hours, As needed, Moderate Pain (4 - 6) MDD:6  PNV Prenatal oral tablet: 1 tab(s) orally once a day

## 2020-07-10 NOTE — PROGRESS NOTE ADULT - SUBJECTIVE AND OBJECTIVE BOX
HPI:   Patient seen and examined at bedside. No acute events overnight. No complaints currently. GI function +/-. Tolerating PO. Voiding. Ambulating and is OOB.     10-points review of system performed with pertinent negative and positive findings documented in the HPI     T(C): 36.8 (07-10-20 @ 05:21), Max: 37.3 (20 @ 09:27)  HR: 59 (07-10-20 @ 05:21) (59 - 94)  BP: 124/64 (07-10-20 @ 05:21) (108/76 - 143/68)  RR: 16 (07-10-20 @ 05:21) (14 - 23)  SpO2: 98% (07-10-20 @ 05:21) (96% - 100%)  Wt(kg): --    PHYSICAL EXAM:    GENERAL: Comfortable, no acute distress  HEAD:  Atraumatic, Normocephalic  EYES: EOMI, PERRLA  HEENT: Moist mucous membranes  NECK: Supple, No JVD  NERVOUS SYSTEM:  Alert & Oriented X3, Motor Strength 5/5 B/L upper and lower extremities  CHEST/LUNG: Clear to auscultation bilaterally  HEART: Regular rate and rhythm; No murmurs, rubs, or gallops  ABDOMEN: soft, NTND, incisions c/d/i  GENITOURINARY- Voiding, no palpable bladder  EXTREMITIES:  No clubbing, cyanosis, or edema  MUSCULOSKELTAL- No muscle tenderness, No joint tenderness  SKIN-no rash  Urinalysis Basic - ( 2020 09:05 )    LABS:                        12.4   6.83  )-----------( 296      ( 10 Jul 2020 05:29 )             36.7     07-10    138  |  102  |  6<L>  ----------------------------<  118<H>  4.3   |  24  |  0.57    Ca    9.7      10 Jul 2020 05:29  Phos  4.2     07-10  Mg     1.9     07-10    TPro  7.1  /  Alb  4.0  /  TBili  0.2  /  DBili  < 0.2  /  AST  22  /  ALT  28  /  AlkPhos  151<H>  07-10    PT/INR - ( 2020 08:52 )   PT: 12.4 SEC;   INR: 1.08          PTT - ( 2020 08:52 )  PTT:31.6 SEC    Color: LIGHT YELLOW / Appearance: CLEAR / S.027 / pH: 5.5  Gluc: NEGATIVE / Ketone: NEGATIVE  / Bili: NEGATIVE / Urobili: NORMAL   Blood: TRACE / Protein: 10 / Nitrite: NEGATIVE   Leuk Esterase: SMALL / RBC: 3-5 / WBC 6-10   Sq Epi: OCC / Non Sq Epi: x / Bacteria: NEGATIVE      IMAGING STUDIES:  < from: US Abdomen Limited (20 @ 09:39) >  IMPRESSION:     Biliary sludge in the gallbladder neck. No evidence of cholelithiasis or acute cholecystitis.    Enlarged fatty liver.    CT Abdomen and Pelvis w/ IV Cont (20 @ 11:03) >    IMPRESSION:     1.There is a 7 mm obstructing calculus in the distal left ureter at the pelvic brim with associated mild left hydroureter.  2. Focal hypodense area within the anterior left interpolar region, possibly focal pyelonephritis.  3. There is cholelithiasis without imaging evidence of acute cholecystitis.  4. The uterus appears enlarged and edematous that could be secondary to recent .    MEDS  acetaminophen   Tablet .. 975 milliGRAM(s) Oral every 6 hours  enoxaparin Injectable 40 milliGRAM(s) SubCutaneous daily  fentaNYL    Injectable 50 MICROGram(s) IV Push every 5 minutes PRN  HYDROmorphone  Injectable 0.5 milliGRAM(s) IV Push every 10 minutes PRN  ibuprofen  Tablet. 400 milliGRAM(s) Oral every 6 hours  lactated ringers. 1000 milliLiter(s) IV Continuous <Continuous>  ondansetron Injectable 4 milliGRAM(s) IV Push once PRN  oxyCODONE    IR 5 milliGRAM(s) Oral every 4 hours PRN

## 2020-07-10 NOTE — DISCHARGE NOTE PROVIDER - HOSPITAL COURSE
29 Japanese-speaking female with hx of recurrent UTI, pyelonephritis (2020),  (2 weeks ago), and known gallstone (last seen in ED ) presents with RUQ pain x 2 weeks that worsens today. Patient reports abdominal pain 1-3 hours after eating greasy, spicy or fried foods, usually lasts for a few hours. However, pain not subside since last night, not improved with pain medication. Associated with N/V. Denies fever/chills/CP/SOB        CT and ultrasound revealed cholelithiasis. HIDA showed acute cholecystitis.        Patient was taken to the OR and is s/p laparoscopic cholecystectomy.        She is tolerating a regular diet.  Pain is well controlled.  She is voiding and ambulating without difficulty.        Patient is stable for discharge home and will follow up with Dr Mendoza in 1-2 weeks. 29 Occitan-speaking female with hx of recurrent UTI, pyelonephritis (2020),  (2 weeks ago), and known gallstone (last seen in ED ) presents with RUQ pain x 2 weeks that worsens today. Patient reports abdominal pain 1-3 hours after eating greasy, spicy or fried foods, usually lasts for a few hours. However, pain not subside since last night, not improved with pain medication. Associated with N/V. Denies fever/chills/CP/SOB        CT and ultrasound revealed cholelithiasis. HIDA showed acute cholecystitis.  Patient was seen by Urology for known history of distal left ureteral 7mm stone.  U/A and urine culture negative.        Patient was taken to the OR and is s/p laparoscopic cholecystectomy.        She is tolerating a regular diet.  Pain is well controlled.  She is voiding and ambulating without difficulty.        Patient is stable for discharge home and will follow up with Dr Mendoza in 1-2 weeks.  She will also follow up with Dr Crump (Urology).

## 2020-07-10 NOTE — DISCHARGE NOTE PROVIDER - CARE PROVIDER_API CALL
Alonso Mendoza  COLON/RECTAL SURGERY  [address] [place] [address]  Phone: [phone]  Fax: [phone]  Follow Up Time: 1 week

## 2020-07-10 NOTE — DISCHARGE NOTE PROVIDER - NSDCFUADDAPPT_GEN_ALL_CORE_FT
Please follow up with Dr Crump in 1-2 weeks  Call to schedule an appointment  [facility]  [address] [place] [address]   [phone]

## 2020-07-14 ENCOUNTER — OUTPATIENT (OUTPATIENT)
Dept: OUTPATIENT SERVICES | Facility: HOSPITAL | Age: 30
LOS: 1 days | End: 2020-07-14

## 2020-07-14 ENCOUNTER — APPOINTMENT (OUTPATIENT)
Dept: OBGYN | Facility: HOSPITAL | Age: 30
End: 2020-07-14

## 2020-07-14 ENCOUNTER — NON-APPOINTMENT (OUTPATIENT)
Age: 30
End: 2020-07-14

## 2020-07-14 VITALS
HEIGHT: 62 IN | WEIGHT: 149 LBS | BODY MASS INDEX: 27.42 KG/M2 | DIASTOLIC BLOOD PRESSURE: 69 MMHG | HEART RATE: 65 BPM | TEMPERATURE: 98 F | SYSTOLIC BLOOD PRESSURE: 113 MMHG

## 2020-07-14 DIAGNOSIS — Z98.891 HISTORY OF UTERINE SCAR FROM PREVIOUS SURGERY: ICD-10-CM

## 2020-07-14 DIAGNOSIS — Z98.891 HISTORY OF UTERINE SCAR FROM PREVIOUS SURGERY: Chronic | ICD-10-CM

## 2020-07-14 RX ORDER — CEPHALEXIN 500 MG/1
500 CAPSULE ORAL 4 TIMES DAILY
Qty: 28 | Refills: 0 | Status: COMPLETED | COMMUNITY
Start: 2020-06-03 | End: 2020-06-10

## 2020-07-14 NOTE — HISTORY OF PRESENT ILLNESS
[Postpartum Follow Up] : postpartum follow up [Complications:___] : complications include: [unfilled] [Delivery Date: ___] : on [unfilled] [Male] : Delivery History: baby boy [Wt. ___] : weighing [unfilled] [BF with Difficulty] : nursing with difficulty [None] : No associated symptoms are reported [Doing Well] : is doing well [Healed] : healed [FreeTextEntry9] : Hospitalized for renal calculi and pyelonephritis during her pregancyu.  Was on ursodial for elevated LFT's and pruritis.  Had C/S for category II tracing and maternal fever [de-identified] : Was readmitted for laparoscopic cholecystectomy 7/9/20. She states she is feeling much better. [FreeTextEntry8] :  #[id number] [de-identified] : Has follow up scheduled with Gen surgery.  Has follow up with urology.  Colposcopy is scheduled for 8/4/20.  Would like Nexplanon and will have placed after postpartum visit prior to end of Depo Provera range.  RTC 3 weeks for full postpartum visit [de-identified] : Doing well.  MInimal pain relieved with Ibuprofen. States that baby is doing well.  Was given DepoProvera before being discharged

## 2020-07-15 DIAGNOSIS — Z98.891 HISTORY OF UTERINE SCAR FROM PREVIOUS SURGERY: ICD-10-CM

## 2020-07-15 LAB — SURGICAL PATHOLOGY STUDY: SIGNIFICANT CHANGE UP

## 2020-07-20 ENCOUNTER — APPOINTMENT (OUTPATIENT)
Dept: SURGERY | Facility: CLINIC | Age: 30
End: 2020-07-20
Payer: MEDICAID

## 2020-07-20 VITALS
SYSTOLIC BLOOD PRESSURE: 116 MMHG | HEIGHT: 61.02 IN | HEART RATE: 62 BPM | BODY MASS INDEX: 28.32 KG/M2 | WEIGHT: 150 LBS | DIASTOLIC BLOOD PRESSURE: 80 MMHG | TEMPERATURE: 98 F

## 2020-07-20 PROCEDURE — 99024 POSTOP FOLLOW-UP VISIT: CPT

## 2020-07-20 NOTE — ASSESSMENT
[FreeTextEntry1] : patient is doing well, states slight RUQ discomfort and sensitivity over incision sites.\par Tolerating diet and having normal BM's\par \par \par incisions are c/d/i and healing well.\par \par \par f/u 2weeks if needed.

## 2020-07-28 ENCOUNTER — APPOINTMENT (OUTPATIENT)
Dept: UROLOGY | Facility: CLINIC | Age: 30
End: 2020-07-28
Payer: MEDICAID

## 2020-07-28 VITALS
RESPIRATION RATE: 17 BRPM | HEIGHT: 61.02 IN | HEART RATE: 84 BPM | WEIGHT: 150 LBS | DIASTOLIC BLOOD PRESSURE: 74 MMHG | BODY MASS INDEX: 28.32 KG/M2 | SYSTOLIC BLOOD PRESSURE: 110 MMHG

## 2020-07-28 VITALS — TEMPERATURE: 97.6 F

## 2020-07-28 DIAGNOSIS — R30.0 DYSURIA: ICD-10-CM

## 2020-07-28 PROCEDURE — 99214 OFFICE O/P EST MOD 30 MIN: CPT

## 2020-07-28 RX ORDER — VITAMIN A ACETATE, .BETA.-CAROTENE, ASCORBIC ACID, CHOLECALCIFEROL, .ALPHA.-TOCOPHEROL ACETATE, DL-, THIAMINE MONONITRATE, RIBOFLAVIN, NIACINAMIDE, PYRIDOXINE HYDROCHLORIDE, FOLIC ACID, CYANOCOBALAMIN, CALCIUM CARBONATE, FERROUS FUMARATE, ZINC OXIDE, CUPRIC OXIDE 3080; 920; 120; 400; 22; 1.84; 3; 20; 10; 1; 12; 200; 27; 25; 2 [IU]/1; [IU]/1; MG/1; [IU]/1; MG/1; MG/1; MG/1; MG/1; MG/1; MG/1; UG/1; MG/1; MG/1; MG/1; MG/1
27-1 TABLET, FILM COATED ORAL
Refills: 0 | Status: COMPLETED | COMMUNITY
End: 2020-07-28

## 2020-07-28 RX ORDER — URSODIOL 300 MG/1
300 CAPSULE ORAL
Qty: 28 | Refills: 1 | Status: COMPLETED | COMMUNITY
Start: 2020-06-03 | End: 2020-07-28

## 2020-07-28 RX ORDER — CHLORHEXIDINE GLUCONATE 4 %
325 (65 FE) LIQUID (ML) TOPICAL TWICE DAILY
Qty: 60 | Refills: 2 | Status: COMPLETED | COMMUNITY
Start: 2020-05-06 | End: 2020-07-28

## 2020-07-28 RX ORDER — NITROFURANTOIN (MONOHYDRATE/MACROCRYSTALS) 25; 75 MG/1; MG/1
100 CAPSULE ORAL DAILY
Qty: 28 | Refills: 1 | Status: COMPLETED | COMMUNITY
Start: 2020-06-03 | End: 2020-07-28

## 2020-07-28 RX ORDER — CEPHALEXIN 500 MG/1
500 CAPSULE ORAL
Refills: 0 | Status: COMPLETED | COMMUNITY
End: 2020-07-28

## 2020-07-28 RX ORDER — CHROMIUM 200 MCG
25 MCG TABLET ORAL DAILY
Qty: 90 | Refills: 1 | Status: COMPLETED | COMMUNITY
Start: 2020-05-06 | End: 2020-07-28

## 2020-07-28 NOTE — HISTORY OF PRESENT ILLNESS
[FreeTextEntry1] : Presents the office for evaluation of left ureteral stone.\yareli Was first seen by urology in the hospital during her most recent pregnancy.  Developed pyelonephritis was treated with antibiotics.  At the time an ultrasound showed a nonobstructing stone in the left kidney.  Following delivery she was readmitted to the hospital with right upper quadrant abdominal pain.  She was noted to have acute cholecystitis and underwent a laparoscopic cholecystectomy on 7/9/2020.  During her admission she underwent a CT scan of the abdomen pelvis which revealed that the stone had migrated into the mid to distal ureter.  Is causing some mild hydronephrosis.  Stone measures approximately 1 cm in longest dimension.\par \par Currently she is denies renal colic or flank pain.  She has chronic low back pain.  She does report intermittent episodes of dysuria and urinary frequency.  She denies fever/chills nausea/vomiting.  Otherwise urination is normal.\par \par She does not take any chronic medications.

## 2020-07-28 NOTE — ASSESSMENT
[FreeTextEntry1] : UA, culture sent today.\par CT images reviewed with the patient.\par Recommend to undergo outpatient surgery for cystoscopy, left ureteroscopy with laser lithotripsy, stone extraction, stent placement.  Procedure reviewed in detail.  Risk benefits of the procedure were discussed the patient.  All questions answered.  She agrees to proceed as recommended.\par \par Visit today conducted via interpreters using [employer].

## 2020-07-28 NOTE — PHYSICAL EXAM
[General Appearance - Well Developed] : well developed [General Appearance - Well Nourished] : well nourished [Well Groomed] : well groomed [Normal Appearance] : normal appearance [General Appearance - In No Acute Distress] : no acute distress [Edema] : no peripheral edema [Respiration, Rhythm And Depth] : normal respiratory rhythm and effort [Abdomen Tenderness] : non-tender [Exaggerated Use Of Accessory Muscles For Inspiration] : no accessory muscle use [Abdomen Soft] : soft [Costovertebral Angle Tenderness] : no ~M costovertebral angle tenderness [Urinary Bladder Findings] : the bladder was normal on palpation [Normal Station and Gait] : the gait and station were normal for the patient's age [No Focal Deficits] : no focal deficits [] : no rash [Oriented To Time, Place, And Person] : oriented to person, place, and time [Affect] : the affect was normal [Mood] : the mood was normal [No Palpable Adenopathy] : no palpable adenopathy [Not Anxious] : not anxious

## 2020-07-28 NOTE — REVIEW OF SYSTEMS
[Eyesight Problems] : eyesight problems [Dry Eyes] : dryness of the eyes [Abdominal Pain] : abdominal pain [Heartburn] : heartburn [Urine Infection (bladder/kidney)] : bladder/kidney infection [Pain during urination] : pain during urination [History of kidney stones] : history of kidney stones [Leakage of urine with straining, coughing, laughing] : leakage of urine with straining, coughing, laughing [Feelings Of Weakness] : feelings of weakness [Negative] : Heme/Lymph

## 2020-07-29 LAB
APPEARANCE: ABNORMAL
BACTERIA: ABNORMAL
BILIRUBIN URINE: NEGATIVE
BLOOD URINE: ABNORMAL
COLOR: YELLOW
GLUCOSE QUALITATIVE U: NEGATIVE
HYALINE CASTS: 0 /LPF
KETONES URINE: NEGATIVE
LEUKOCYTE ESTERASE URINE: ABNORMAL
MICROSCOPIC-UA: NORMAL
NITRITE URINE: POSITIVE
PH URINE: 6
PROTEIN URINE: ABNORMAL
RED BLOOD CELLS URINE: 2 /HPF
SPECIFIC GRAVITY URINE: 1.02
SQUAMOUS EPITHELIAL CELLS: 0 /HPF
UROBILINOGEN URINE: NORMAL
WHITE BLOOD CELLS URINE: 421 /HPF

## 2020-08-04 ENCOUNTER — APPOINTMENT (OUTPATIENT)
Dept: OBGYN | Facility: HOSPITAL | Age: 30
End: 2020-08-04

## 2020-08-05 ENCOUNTER — TRANSCRIPTION ENCOUNTER (OUTPATIENT)
Age: 30
End: 2020-08-05

## 2020-08-05 ENCOUNTER — INPATIENT (INPATIENT)
Facility: HOSPITAL | Age: 30
LOS: 3 days | Discharge: HOME | End: 2020-08-09
Attending: UROLOGY | Admitting: UROLOGY
Payer: MEDICAID

## 2020-08-05 VITALS
HEART RATE: 112 BPM | SYSTOLIC BLOOD PRESSURE: 126 MMHG | OXYGEN SATURATION: 99 % | RESPIRATION RATE: 18 BRPM | DIASTOLIC BLOOD PRESSURE: 74 MMHG | TEMPERATURE: 102 F

## 2020-08-05 DIAGNOSIS — Z98.891 HISTORY OF UTERINE SCAR FROM PREVIOUS SURGERY: Chronic | ICD-10-CM

## 2020-08-05 DIAGNOSIS — N20.0 CALCULUS OF KIDNEY: ICD-10-CM

## 2020-08-05 PROBLEM — K80.20 CALCULUS OF GALLBLADDER WITHOUT CHOLECYSTITIS WITHOUT OBSTRUCTION: Chronic | Status: ACTIVE | Noted: 2020-07-01

## 2020-08-05 PROBLEM — N15.9 RENAL TUBULO-INTERSTITIAL DISEASE, UNSPECIFIED: Chronic | Status: ACTIVE | Noted: 2020-07-01

## 2020-08-05 LAB
ALBUMIN SERPL ELPH-MCNC: 4 G/DL — SIGNIFICANT CHANGE UP (ref 3.3–5)
ALP SERPL-CCNC: 124 U/L — HIGH (ref 40–120)
ALT FLD-CCNC: 33 U/L — SIGNIFICANT CHANGE UP (ref 4–33)
ANION GAP SERPL CALC-SCNC: 15 MMO/L — HIGH (ref 7–14)
APPEARANCE UR: SIGNIFICANT CHANGE UP
APTT BLD: 36.6 SEC — HIGH (ref 27–36.3)
AST SERPL-CCNC: 23 U/L — SIGNIFICANT CHANGE UP (ref 4–32)
BACTERIA # UR AUTO: HIGH
BASE EXCESS BLDV CALC-SCNC: 0.4 MMOL/L — SIGNIFICANT CHANGE UP
BASOPHILS # BLD AUTO: 0.02 K/UL — SIGNIFICANT CHANGE UP (ref 0–0.2)
BASOPHILS NFR BLD AUTO: 0.1 % — SIGNIFICANT CHANGE UP (ref 0–2)
BILIRUB SERPL-MCNC: 0.3 MG/DL — SIGNIFICANT CHANGE UP (ref 0.2–1.2)
BILIRUB UR-MCNC: NEGATIVE — SIGNIFICANT CHANGE UP
BLD GP AB SCN SERPL QL: NEGATIVE — SIGNIFICANT CHANGE UP
BLOOD UR QL VISUAL: SIGNIFICANT CHANGE UP
BUN SERPL-MCNC: 7 MG/DL — SIGNIFICANT CHANGE UP (ref 7–23)
CALCIUM SERPL-MCNC: 8.6 MG/DL — SIGNIFICANT CHANGE UP (ref 8.4–10.5)
CHLORIDE SERPL-SCNC: 101 MMOL/L — SIGNIFICANT CHANGE UP (ref 98–107)
CO2 SERPL-SCNC: 22 MMOL/L — SIGNIFICANT CHANGE UP (ref 22–31)
COLOR SPEC: SIGNIFICANT CHANGE UP
CREAT SERPL-MCNC: 0.84 MG/DL — SIGNIFICANT CHANGE UP (ref 0.5–1.3)
EOSINOPHIL # BLD AUTO: 0.06 K/UL — SIGNIFICANT CHANGE UP (ref 0–0.5)
EOSINOPHIL NFR BLD AUTO: 0.4 % — SIGNIFICANT CHANGE UP (ref 0–6)
GAS PNL BLDV: 136 MMOL/L — SIGNIFICANT CHANGE UP (ref 136–146)
GLUCOSE BLDV-MCNC: 95 MG/DL — SIGNIFICANT CHANGE UP (ref 70–99)
GLUCOSE SERPL-MCNC: 103 MG/DL — HIGH (ref 70–99)
GLUCOSE UR-MCNC: NEGATIVE — SIGNIFICANT CHANGE UP
HCO3 BLDV-SCNC: 24 MMOL/L — SIGNIFICANT CHANGE UP (ref 20–27)
HCT VFR BLD CALC: 34.1 % — LOW (ref 34.5–45)
HCT VFR BLDV CALC: 35.7 % — SIGNIFICANT CHANGE UP (ref 34.5–45)
HGB BLD-MCNC: 10.7 G/DL — LOW (ref 11.5–15.5)
HGB BLDV-MCNC: 11.6 G/DL — SIGNIFICANT CHANGE UP (ref 11.5–15.5)
HYALINE CASTS # UR AUTO: NEGATIVE — SIGNIFICANT CHANGE UP
IMM GRANULOCYTES NFR BLD AUTO: 0.4 % — SIGNIFICANT CHANGE UP (ref 0–1.5)
INR BLD: 1.29 — HIGH (ref 0.88–1.16)
KETONES UR-MCNC: NEGATIVE — SIGNIFICANT CHANGE UP
LACTATE BLDV-MCNC: 1 MMOL/L — SIGNIFICANT CHANGE UP (ref 0.5–2)
LEUKOCYTE ESTERASE UR-ACNC: HIGH
LYMPHOCYTES # BLD AUTO: 1.02 K/UL — SIGNIFICANT CHANGE UP (ref 1–3.3)
LYMPHOCYTES # BLD AUTO: 7.3 % — LOW (ref 13–44)
MCHC RBC-ENTMCNC: 27.2 PG — SIGNIFICANT CHANGE UP (ref 27–34)
MCHC RBC-ENTMCNC: 31.4 % — LOW (ref 32–36)
MCV RBC AUTO: 86.5 FL — SIGNIFICANT CHANGE UP (ref 80–100)
MONOCYTES # BLD AUTO: 0.45 K/UL — SIGNIFICANT CHANGE UP (ref 0–0.9)
MONOCYTES NFR BLD AUTO: 3.2 % — SIGNIFICANT CHANGE UP (ref 2–14)
NEUTROPHILS # BLD AUTO: 12.33 K/UL — HIGH (ref 1.8–7.4)
NEUTROPHILS NFR BLD AUTO: 88.6 % — HIGH (ref 43–77)
NITRITE UR-MCNC: NEGATIVE — SIGNIFICANT CHANGE UP
NRBC # FLD: 0 K/UL — SIGNIFICANT CHANGE UP (ref 0–0)
PCO2 BLDV: 40 MMHG — LOW (ref 41–51)
PH BLDV: 7.4 PH — SIGNIFICANT CHANGE UP (ref 7.32–7.43)
PH UR: 6 — SIGNIFICANT CHANGE UP (ref 5–8)
PLATELET # BLD AUTO: 289 K/UL — SIGNIFICANT CHANGE UP (ref 150–400)
PMV BLD: 10.3 FL — SIGNIFICANT CHANGE UP (ref 7–13)
PO2 BLDV: 26 MMHG — LOW (ref 35–40)
POTASSIUM BLDV-SCNC: 3.2 MMOL/L — LOW (ref 3.4–4.5)
POTASSIUM SERPL-MCNC: 3.6 MMOL/L — SIGNIFICANT CHANGE UP (ref 3.5–5.3)
POTASSIUM SERPL-SCNC: 3.6 MMOL/L — SIGNIFICANT CHANGE UP (ref 3.5–5.3)
PROT SERPL-MCNC: 7.4 G/DL — SIGNIFICANT CHANGE UP (ref 6–8.3)
PROT UR-MCNC: 10 — SIGNIFICANT CHANGE UP
PROTHROM AB SERPL-ACNC: 14.7 SEC — HIGH (ref 10.6–13.6)
RBC # BLD: 3.94 M/UL — SIGNIFICANT CHANGE UP (ref 3.8–5.2)
RBC # FLD: 13.6 % — SIGNIFICANT CHANGE UP (ref 10.3–14.5)
RBC CASTS # UR COMP ASSIST: HIGH (ref 0–?)
RH IG SCN BLD-IMP: POSITIVE — SIGNIFICANT CHANGE UP
SAO2 % BLDV: 37.7 % — LOW (ref 60–85)
SODIUM SERPL-SCNC: 138 MMOL/L — SIGNIFICANT CHANGE UP (ref 135–145)
SP GR SPEC: 1.01 — SIGNIFICANT CHANGE UP (ref 1–1.04)
SQUAMOUS # UR AUTO: SIGNIFICANT CHANGE UP
UROBILINOGEN FLD QL: NORMAL — SIGNIFICANT CHANGE UP
WBC # BLD: 13.94 K/UL — HIGH (ref 3.8–10.5)
WBC # FLD AUTO: 13.94 K/UL — HIGH (ref 3.8–10.5)
WBC UR QL: >50 — HIGH (ref 0–?)

## 2020-08-05 PROCEDURE — 99285 EMERGENCY DEPT VISIT HI MDM: CPT

## 2020-08-05 PROCEDURE — 76770 US EXAM ABDO BACK WALL COMP: CPT | Mod: 26

## 2020-08-05 PROCEDURE — 99223 1ST HOSP IP/OBS HIGH 75: CPT | Mod: 57,AI

## 2020-08-05 PROCEDURE — 74176 CT ABD & PELVIS W/O CONTRAST: CPT | Mod: 26

## 2020-08-05 RX ORDER — SENNA PLUS 8.6 MG/1
2 TABLET ORAL AT BEDTIME
Refills: 0 | Status: DISCONTINUED | OUTPATIENT
Start: 2020-08-05 | End: 2020-08-09

## 2020-08-05 RX ORDER — OXYCODONE HYDROCHLORIDE 5 MG/1
10 TABLET ORAL EVERY 4 HOURS
Refills: 0 | Status: DISCONTINUED | OUTPATIENT
Start: 2020-08-05 | End: 2020-08-09

## 2020-08-05 RX ORDER — CEFTRIAXONE 500 MG/1
1000 INJECTION, POWDER, FOR SOLUTION INTRAMUSCULAR; INTRAVENOUS EVERY 24 HOURS
Refills: 0 | Status: DISCONTINUED | OUTPATIENT
Start: 2020-08-06 | End: 2020-08-07

## 2020-08-05 RX ORDER — ACETAMINOPHEN 500 MG
1000 TABLET ORAL EVERY 6 HOURS
Refills: 0 | Status: COMPLETED | OUTPATIENT
Start: 2020-08-05 | End: 2020-08-06

## 2020-08-05 RX ORDER — SODIUM CHLORIDE 9 MG/ML
2000 INJECTION, SOLUTION INTRAVENOUS ONCE
Refills: 0 | Status: COMPLETED | OUTPATIENT
Start: 2020-08-05 | End: 2020-08-05

## 2020-08-05 RX ORDER — ACETAMINOPHEN 500 MG
975 TABLET ORAL ONCE
Refills: 0 | Status: COMPLETED | OUTPATIENT
Start: 2020-08-05 | End: 2020-08-05

## 2020-08-05 RX ORDER — HEPARIN SODIUM 5000 [USP'U]/ML
5000 INJECTION INTRAVENOUS; SUBCUTANEOUS EVERY 12 HOURS
Refills: 0 | Status: DISCONTINUED | OUTPATIENT
Start: 2020-08-05 | End: 2020-08-09

## 2020-08-05 RX ORDER — OXYCODONE HYDROCHLORIDE 5 MG/1
5 TABLET ORAL EVERY 4 HOURS
Refills: 0 | Status: DISCONTINUED | OUTPATIENT
Start: 2020-08-05 | End: 2020-08-09

## 2020-08-05 RX ORDER — TAMSULOSIN HYDROCHLORIDE 0.4 MG/1
0.4 CAPSULE ORAL DAILY
Refills: 0 | Status: DISCONTINUED | OUTPATIENT
Start: 2020-08-05 | End: 2020-08-09

## 2020-08-05 RX ORDER — CEFTRIAXONE 500 MG/1
1000 INJECTION, POWDER, FOR SOLUTION INTRAMUSCULAR; INTRAVENOUS ONCE
Refills: 0 | Status: COMPLETED | OUTPATIENT
Start: 2020-08-05 | End: 2020-08-05

## 2020-08-05 RX ORDER — MORPHINE SULFATE 50 MG/1
2 CAPSULE, EXTENDED RELEASE ORAL ONCE
Refills: 0 | Status: DISCONTINUED | OUTPATIENT
Start: 2020-08-05 | End: 2020-08-05

## 2020-08-05 RX ORDER — KETOROLAC TROMETHAMINE 30 MG/ML
30 SYRINGE (ML) INJECTION ONCE
Refills: 0 | Status: DISCONTINUED | OUTPATIENT
Start: 2020-08-05 | End: 2020-08-05

## 2020-08-05 RX ORDER — SODIUM CHLORIDE 9 MG/ML
1000 INJECTION, SOLUTION INTRAVENOUS
Refills: 0 | Status: DISCONTINUED | OUTPATIENT
Start: 2020-08-05 | End: 2020-08-08

## 2020-08-05 RX ADMIN — SODIUM CHLORIDE 2000 MILLILITER(S): 9 INJECTION, SOLUTION INTRAVENOUS at 19:36

## 2020-08-05 RX ADMIN — Medication 30 MILLIGRAM(S): at 19:16

## 2020-08-05 RX ADMIN — MORPHINE SULFATE 2 MILLIGRAM(S): 50 CAPSULE, EXTENDED RELEASE ORAL at 19:37

## 2020-08-05 RX ADMIN — CEFTRIAXONE 100 MILLIGRAM(S): 500 INJECTION, POWDER, FOR SOLUTION INTRAMUSCULAR; INTRAVENOUS at 19:18

## 2020-08-05 RX ADMIN — Medication 975 MILLIGRAM(S): at 19:26

## 2020-08-05 NOTE — H&P ADULT - NSHPPHYSICALEXAM_GEN_ALL_CORE
Gen NAD  abd soft NT ND  pulm no respiratory distress  Neuro no deficits  HEENT atraumatic   no CVAT

## 2020-08-05 NOTE — ED PROVIDER NOTE - ATTENDING CONTRIBUTION TO CARE
29F s/p  1month ago, recently dx with L renal stone 2 wks ago p/w worsening pain, radiating toward groin with nausea. States that since last ED visit she had followed with urology and prescribed abx for UTI. +fever with body aches today. Reports vaginal bleeding, but has been having since  ~1month ago. +dysuria and frequency. Denies abd pain, CP, SOB, vomiting, or diarrhea.    Gen: nad  CV: tachy, regular, no appreciable murmur  Pulm: clear lungs  Abd: soft, nd, + L CVA tenderness  Ext: no edema/swelling  MDM: 29F s/p  1month ago, recently dx with L renal stone 2 wks ago, now with worsening pain, febrile and tachycardic concerning for infected stone - sepsis workup with blood and urine cultures, cbc, cmp, vbg with lacate, repeat lactate if abd, 30cc/kg and emperic ceftriaxone for likely urinary source of infection - urology called, will obtain repeat CT per their request

## 2020-08-05 NOTE — ED PROVIDER NOTE - OBJECTIVE STATEMENT
29 y.o female with no PMhx s/p csection 1month prior presents to the ED complaining of having left sided flank pain radiating towards the groin, since today, patient also has been having fever along with body aches since today, patient was at a hospital last week where she she was found to have kidney stones and gallstones and followed up with urology. Pt states that she has been naeusous however has not had any episodes of vomiting. Pt states that she is having vaginal bleeding, but normal for being s/p csection. Pt currently also admits to having dysuria and urinary frequency however denies having any abdominal pain, CP, SOB, RUTLEDGE, headaches, dizziness.

## 2020-08-05 NOTE — ED PROVIDER NOTE - PROGRESS NOTE DETAILS
[facility] PGY-3:  Signout from Harpal HOLLAND:  28yo F hx nephrolithiasis here with continuing pain from kidney stone dx 2 weeks ago, now infected |  Urology consulted awaiting Recs

## 2020-08-05 NOTE — H&P ADULT - ASSESSMENT
29F with fevers and left ureteral stones.    - Ceftriaxone  - Fluid resuscitation  - Will admit to urology for monitoring and left ureteral stent  - Strict I&Os  - NPO  - Added on for urgent stent 29F with fevers and left ureteral stones.    - Ceftriaxone  - Fluid resuscitation  - Will admit to urology for monitoring and left ureteral stent  - Strict I&Os  - NPO  - Added on for urgent stent  - Trend fevers

## 2020-08-05 NOTE — H&P ADULT - HISTORY OF PRESENT ILLNESS
29F with no medical history, s/p c section approximately 5 weeks ago who presents with left flank pain. Known left sided ureteral stones. Fever to 101.8 in the ED. WBC 14 and CT showing multiple obstructing left midureteral stones with hydro. Denies nausea/vomiting, pain controlled.

## 2020-08-05 NOTE — ED ADULT NURSE NOTE - OBJECTIVE STATEMENT
30 y/o female presents to ED to spot #1, a&ox4. Pt Azeri speaking, endorses having back pain since last week. Pt endorses being told that she has a kidney stone. Presents with chills, and nausea. Denies sob, chest pain, vomiting. 18g IV placed to RAC. Labs obtained as per orders. Will continue to monitor.

## 2020-08-05 NOTE — H&P ADULT - NSHPLABSRESULTS_GEN_ALL_CORE
Vital Signs Last 24 Hrs  T(C): 39.5 (05 Aug 2020 19:30), Max: 39.5 (05 Aug 2020 19:30)  T(F): 103.1 (05 Aug 2020 19:30), Max: 103.1 (05 Aug 2020 19:30)  HR: 115 (05 Aug 2020 19:30) (112 - 115)  BP: 125/76 (05 Aug 2020 19:30) (125/76 - 126/74)  BP(mean): --  RR: 20 (05 Aug 2020 19:) (18 - 20)  SpO2: 100% (05 Aug 2020 19:30) (99% - 100%)    I&O's Detail                            10.7   13.94 )-----------( 289      ( 05 Aug 2020 17: )             34.1       08-05    138  |  101  |  7   ----------------------------<  103<H>  3.6   |  22  |  0.84    Ca    8.6      05 Aug 2020 17:    TPro  7.4  /  Alb  4.0  /  TBili  0.3  /  DBili  x   /  AST  23  /  ALT  33  /  AlkPhos  124<H>  08-05      CAPILLARY BLOOD GLUCOSE          LIVER FUNCTIONS - ( 05 Aug 2020 17: )  Alb: 4.0 g/dL / Pro: 7.4 g/dL / ALK PHOS: 124 u/L / ALT: 33 u/L / AST: 23 u/L / GGT: x             PT/INR - ( 05 Aug 2020 17:00 )   PT: 14.7 SEC;   INR: 1.29          PTT - ( 05 Aug 2020 17: )  PTT:36.6 SEC    Urinalysis Basic - ( 05 Aug 2020 17:00 )    Color: LIGHT YELLOW / Appearance: Lt TURBID / S.011 / pH: 6.0  Gluc: NEGATIVE / Ketone: NEGATIVE  / Bili: NEGATIVE / Urobili: NORMAL   Blood: SMALL / Protein: 10 / Nitrite: NEGATIVE   Leuk Esterase: LARGE / RBC: 6-10 / WBC >50   Sq Epi: OCC / Non Sq Epi: x / Bacteria: MANY

## 2020-08-05 NOTE — ED ADULT NURSE NOTE - CHIEF COMPLAINT QUOTE
inter#[id number]    pt was told she has kidney stone last week with chills, c/o nausea ,pt c/o groin pain

## 2020-08-05 NOTE — ED ADULT TRIAGE NOTE - CHIEF COMPLAINT QUOTE
inter#[id number]    pt was told she has kidney stone last week with chills, c/o nausea ,pt c/o groin pain

## 2020-08-05 NOTE — ED PROVIDER NOTE - CLINICAL SUMMARY MEDICAL DECISION MAKING FREE TEXT BOX
29 y.o female with no PMhx s/p csection 1month prior presents to the ED complaining of having left sided flank pain radiating towards the groin, since today, flank pain left sided-labs, fluids, medications, ct abd/ pelvis reassess

## 2020-08-06 ENCOUNTER — APPOINTMENT (OUTPATIENT)
Dept: OBGYN | Facility: HOSPITAL | Age: 30
End: 2020-08-06

## 2020-08-06 DIAGNOSIS — N13.2 HYDRONEPHROSIS WITH RENAL AND URETERAL CALCULOUS OBSTRUCTION: ICD-10-CM

## 2020-08-06 LAB
ALBUMIN SERPL ELPH-MCNC: 3.7 G/DL — SIGNIFICANT CHANGE UP (ref 3.3–5)
ALP SERPL-CCNC: 122 U/L — HIGH (ref 40–120)
ALT FLD-CCNC: 39 U/L — HIGH (ref 4–33)
ANION GAP SERPL CALC-SCNC: 16 MMO/L — HIGH (ref 7–14)
ANION GAP SERPL CALC-SCNC: 16 MMO/L — HIGH (ref 7–14)
AST SERPL-CCNC: 27 U/L — SIGNIFICANT CHANGE UP (ref 4–32)
BILIRUB SERPL-MCNC: 0.4 MG/DL — SIGNIFICANT CHANGE UP (ref 0.2–1.2)
BUN SERPL-MCNC: 7 MG/DL — SIGNIFICANT CHANGE UP (ref 7–23)
BUN SERPL-MCNC: 7 MG/DL — SIGNIFICANT CHANGE UP (ref 7–23)
CALCIUM SERPL-MCNC: 8.4 MG/DL — SIGNIFICANT CHANGE UP (ref 8.4–10.5)
CALCIUM SERPL-MCNC: 8.4 MG/DL — SIGNIFICANT CHANGE UP (ref 8.4–10.5)
CHLORIDE SERPL-SCNC: 105 MMOL/L — SIGNIFICANT CHANGE UP (ref 98–107)
CHLORIDE SERPL-SCNC: 105 MMOL/L — SIGNIFICANT CHANGE UP (ref 98–107)
CO2 SERPL-SCNC: 21 MMOL/L — LOW (ref 22–31)
CO2 SERPL-SCNC: 21 MMOL/L — LOW (ref 22–31)
CREAT SERPL-MCNC: 0.82 MG/DL — SIGNIFICANT CHANGE UP (ref 0.5–1.3)
CREAT SERPL-MCNC: 0.82 MG/DL — SIGNIFICANT CHANGE UP (ref 0.5–1.3)
GLUCOSE SERPL-MCNC: 113 MG/DL — HIGH (ref 70–99)
GLUCOSE SERPL-MCNC: 113 MG/DL — HIGH (ref 70–99)
POTASSIUM SERPL-MCNC: 3.3 MMOL/L — LOW (ref 3.5–5.3)
POTASSIUM SERPL-MCNC: 3.3 MMOL/L — LOW (ref 3.5–5.3)
POTASSIUM SERPL-SCNC: 3.3 MMOL/L — LOW (ref 3.5–5.3)
POTASSIUM SERPL-SCNC: 3.3 MMOL/L — LOW (ref 3.5–5.3)
PROT SERPL-MCNC: 6.8 G/DL — SIGNIFICANT CHANGE UP (ref 6–8.3)
SARS-COV-2 RNA SPEC QL NAA+PROBE: SIGNIFICANT CHANGE UP
SODIUM SERPL-SCNC: 142 MMOL/L — SIGNIFICANT CHANGE UP (ref 135–145)
SODIUM SERPL-SCNC: 142 MMOL/L — SIGNIFICANT CHANGE UP (ref 135–145)

## 2020-08-06 PROCEDURE — 99223 1ST HOSP IP/OBS HIGH 75: CPT

## 2020-08-06 PROCEDURE — 52332 CYSTOSCOPY AND TREATMENT: CPT | Mod: LT

## 2020-08-06 RX ORDER — POTASSIUM CHLORIDE 20 MEQ
40 PACKET (EA) ORAL EVERY 4 HOURS
Refills: 0 | Status: COMPLETED | OUTPATIENT
Start: 2020-08-06 | End: 2020-08-06

## 2020-08-06 RX ADMIN — TAMSULOSIN HYDROCHLORIDE 0.4 MILLIGRAM(S): 0.4 CAPSULE ORAL at 11:43

## 2020-08-06 RX ADMIN — Medication 40 MILLIEQUIVALENT(S): at 18:15

## 2020-08-06 RX ADMIN — Medication 1 TABLET(S): at 18:15

## 2020-08-06 RX ADMIN — SODIUM CHLORIDE 125 MILLILITER(S): 9 INJECTION, SOLUTION INTRAVENOUS at 00:31

## 2020-08-06 RX ADMIN — HEPARIN SODIUM 5000 UNIT(S): 5000 INJECTION INTRAVENOUS; SUBCUTANEOUS at 18:15

## 2020-08-06 RX ADMIN — Medication 400 MILLIGRAM(S): at 18:15

## 2020-08-06 RX ADMIN — CEFTRIAXONE 100 MILLIGRAM(S): 500 INJECTION, POWDER, FOR SOLUTION INTRAMUSCULAR; INTRAVENOUS at 18:15

## 2020-08-06 RX ADMIN — Medication 400 MILLIGRAM(S): at 00:26

## 2020-08-06 RX ADMIN — Medication 400 MILLIGRAM(S): at 11:43

## 2020-08-06 RX ADMIN — Medication 40 MILLIEQUIVALENT(S): at 15:01

## 2020-08-06 NOTE — CHART NOTE - NSCHARTNOTEFT_GEN_A_CORE
Post op Check: 30 yo POD #0 left ureteral stent placement    Pt seen and examined without complaints. Pain is controlled. Denies SOB/CP/N/V.  [place] interpretors utilized    Vital Signs Last 24 Hrs  T(C): 37 (06 Aug 2020 10:37), Max: 39.5 (05 Aug 2020 19:30)  T(F): 98.6 (06 Aug 2020 10:37), Max: 103.1 (05 Aug 2020 19:30)  HR: 60 (06 Aug 2020 10:37) (60 - 115)  BP: 104/52 (06 Aug 2020 10:37) (100/43 - 128/83)  BP(mean): 79 (06 Aug 2020 10:00) (57 - 79)  RR: 18 (06 Aug 2020 10:37) (18 - 23)  SpO2: 99% (06 Aug 2020 10:37) (95% - 100%)    I&O's Summary  Brown: 150 yellow recorded (more in bag)  05 Aug 2020 07:01  -  06 Aug 2020 07:00  --------------------------------------------------------  IN: 0 mL / OUT: 400 mL / NET: -400 mL    06 Aug 2020 07:01  -  06 Aug 2020 12:35  --------------------------------------------------------  IN: 250 mL / OUT: 150 mL / NET: 100 mL        Physical Exam  Gen: NAD  Pulm: No respiratory distress, clear to auscultation  CV: Reg  Abd: Soft, NT, ND  Back: Minimal L CVAT  : Stephanie secured  Venodynes: In place                          10.7   13.94 )-----------( 289      ( 05 Aug 2020 17:00 )             34.1       08-06    142  |  105  |  7   ----------------------------<  113<H>  3.3<L>   |  21<L>  |  0.82    Ca    8.4      06 Aug 2020 05:57    TPro  6.8  /  Alb  3.7  /  TBili  0.4  /  DBili  x   /  AST  27  /  ALT  39<H>  /  AlkPhos  122<H>  08-06      Plan:   IVF: LR @ 125  Diet: Regular  Cultures: pending  Abx: CTX  Strict I&O's  Analgesia and antiemetics as needed  Monitor temp  DVT prophylaxis/OOB  Incentive spirometry

## 2020-08-06 NOTE — PATIENT PROFILE ADULT - NSPROSPIRITUALVALUESFT_GEN_A_NUR
None no dermatitis, no environmental allergies, no food allergies, no immunosuppressive disorder, and no pruritus.

## 2020-08-06 NOTE — CONSULT NOTE ADULT - ASSESSMENT
32F , s/p cholecystectomy in 2020, recent multiple admissions for pyelo now with sepsis and L obstructing ureteral stones s/p stent.

## 2020-08-06 NOTE — CONSULT NOTE ADULT - SUBJECTIVE AND OBJECTIVE BOX
HPI:   30yo  with history of pyelonephritis presenting with infected ureteral stone.  In 2020 pt was hospitalized in the MICU with sepsis due to pyelonephritis and a nonobstructing 1 cm L renal stone.  She was medically treated with IV antibiotics and finished a 2 week course of keflex at home.  Pt returned at the end of 2020 37 weeks pregnant at the time with symptoms of pyelo again.  She delivered a healthy baby via  during that hospital course.  ID was involved and she received appropriate antibiotics for a 10 day course.  In early July she was admitted for cholelithiasis without acute cholecystitis.  She underwent a lap naima during that admission.    She now returns to ER with sepsis and multiple obstructing stones leading to L hydro now s/p L ureteral stent.  Fevers have subsided while on antibiotics.  Pt denies n/v currently.        PAST MEDICAL & SURGICAL HISTORY:  Kidney infection  Gallstone  Pyelonephritis affecting pregnancy: admit 2020 rx with ceftriaxone  H/O:   No significant past surgical history      Review of Systems:   CONSTITUTIONAL: No fever, weight loss, or fatigue  EYES: No eye pain, visual disturbances, or discharge  ENMT:  No difficulty hearing, tinnitus, vertigo; No sinus or throat pain  NECK: No pain or stiffness  RESPIRATORY: No cough, wheezing, chills or hemoptysis; No shortness of breath  CARDIOVASCULAR: No chest pain, palpitations, dizziness, or leg swelling  GASTROINTESTINAL: No abdominal or epigastric pain. No nausea, vomiting, or hematemesis; No diarrhea or constipation. No melena or hematochezia.  GENITOURINARY: No dysuria, frequency, hematuria, or incontinence  NEUROLOGICAL: No headaches, memory loss, loss of strength, numbness, or tremors  SKIN: No itching, burning, rashes, or lesions   ENDOCRINE: No heat or cold intolerance; No hair loss  MUSCULOSKELETAL: No joint pain or swelling; No muscle, back, or extremity pain  HEME/LYMPH: No easy bruising, or bleeding gums  ALLERY AND IMMUNOLOGIC: No hives or eczema    Allergies    No Known Allergies    Intolerances        Social History:    with one child; denies tobacco/etoh/illicit substances     FAMILY HISTORY:  Noncontributory     MEDICATIONS  (STANDING):  acetaminophen  IVPB .. 1000 milliGRAM(s) IV Intermittent every 6 hours  cefTRIAXone   IVPB 1000 milliGRAM(s) IV Intermittent every 24 hours  heparin   Injectable 5000 Unit(s) SubCutaneous every 12 hours  lactated ringers. 1000 milliLiter(s) (125 mL/Hr) IV Continuous <Continuous>  potassium chloride    Tablet ER 40 milliEquivalent(s) Oral every 4 hours  prenatal multivitamin 1 Tablet(s) Oral daily  tamsulosin 0.4 milliGRAM(s) Oral daily    MEDICATIONS  (PRN):  oxyCODONE    IR 5 milliGRAM(s) Oral every 4 hours PRN Moderate Pain (4 - 6)  oxyCODONE    IR 10 milliGRAM(s) Oral every 4 hours PRN Severe Pain (7 - 10)  senna 2 Tablet(s) Oral at bedtime PRN Constipation        CAPILLARY BLOOD GLUCOSE    Vital Signs Last 24 Hrs  T(C): 37 (06 Aug 2020 10:37), Max: 39.5 (05 Aug 2020 19:30)  T(F): 98.6 (06 Aug 2020 10:37), Max: 103.1 (05 Aug 2020 19:30)  HR: 60 (06 Aug 2020 10:37) (60 - 115)  BP: 104/52 (06 Aug 2020 10:37) (100/43 - 128/83)  BP(mean): 79 (06 Aug 2020 10:00) (57 - 79)  RR: 18 (06 Aug 2020 10:37) (18 - 23)  SpO2: 99% (06 Aug 2020 10:37) (95% - 100%)    PHYSICAL EXAM:  GENERAL: NAD, well-developed  HEAD:  Atraumatic, Normocephalic  EYES: EOMI, conjunctiva and sclera clear  NECK: Supple, No JVD  CHEST/LUNG: Clear to auscultation bilaterally; No wheeze  HEART: Regular rate and rhythm; No murmurs  ABDOMEN: Soft, Nontender, Nondistended; Bowel sounds present  EXTREMITIES:  2+ Peripheral Pulses, No edema  PSYCH: AAOx3  NEUROLOGY: non-focal  SKIN: No rashes or lesions    LABS:                        10.7   13.94 )-----------( 289      ( 05 Aug 2020 17:00 )             34.1     08-06    142  |  105  |  7   ----------------------------<  113<H>  3.3<L>   |  21<L>  |  0.82    Ca    8.4      06 Aug 2020 05:57    TPro  6.8  /  Alb  3.7  /  TBili  0.4  /  DBili  x   /  AST  27  /  ALT  39<H>  /  AlkPhos  122<H>  08-06    PT/INR - ( 05 Aug 2020 17:00 )   PT: 14.7 SEC;   INR: 1.29          PTT - ( 05 Aug 2020 17:00 )  PTT:36.6 SEC      Urinalysis Basic - ( 05 Aug 2020 17:00 )    Color: LIGHT YELLOW / Appearance: Lt TURBID / S.011 / pH: 6.0  Gluc: NEGATIVE / Ketone: NEGATIVE  / Bili: NEGATIVE / Urobili: NORMAL   Blood: SMALL / Protein: 10 / Nitrite: NEGATIVE   Leuk Esterase: LARGE / RBC: 6-10 / WBC >50   Sq Epi: OCC / Non Sq Epi: x / Bacteria: MANY        EKG(personally reviewed):    RADIOLOGY & ADDITIONAL TESTS:    Imaging Personally Reviewed:    Consultant(s) Notes Reviewed:      Care Discussed with Consultants/Other Providers:

## 2020-08-06 NOTE — CONSULT NOTE ADULT - PROBLEM SELECTOR RECOMMENDATION 9
- management as per urology  - follow up cultures  - continue with antibiotics - would strongly consider ID eval

## 2020-08-07 ENCOUNTER — TRANSCRIPTION ENCOUNTER (OUTPATIENT)
Age: 30
End: 2020-08-07

## 2020-08-07 LAB
BACTERIA UR CULT: ABNORMAL
CULTURE RESULTS: SIGNIFICANT CHANGE UP
HCT VFR BLD CALC: 28.8 % — LOW (ref 34.5–45)
HGB BLD-MCNC: 9.2 G/DL — LOW (ref 11.5–15.5)
MCHC RBC-ENTMCNC: 27.3 PG — SIGNIFICANT CHANGE UP (ref 27–34)
MCHC RBC-ENTMCNC: 31.9 % — LOW (ref 32–36)
MCV RBC AUTO: 85.5 FL — SIGNIFICANT CHANGE UP (ref 80–100)
NRBC # FLD: 0 K/UL — SIGNIFICANT CHANGE UP (ref 0–0)
PLATELET # BLD AUTO: 317 K/UL — SIGNIFICANT CHANGE UP (ref 150–400)
PMV BLD: 10.5 FL — SIGNIFICANT CHANGE UP (ref 7–13)
RBC # BLD: 3.37 M/UL — LOW (ref 3.8–5.2)
RBC # FLD: 13.9 % — SIGNIFICANT CHANGE UP (ref 10.3–14.5)
SPECIMEN SOURCE: SIGNIFICANT CHANGE UP
WBC # BLD: 20.43 K/UL — HIGH (ref 3.8–10.5)
WBC # FLD AUTO: 20.43 K/UL — HIGH (ref 3.8–10.5)

## 2020-08-07 PROCEDURE — 99233 SBSQ HOSP IP/OBS HIGH 50: CPT

## 2020-08-07 PROCEDURE — 99232 SBSQ HOSP IP/OBS MODERATE 35: CPT

## 2020-08-07 RX ORDER — PIPERACILLIN AND TAZOBACTAM 4; .5 G/20ML; G/20ML
3.38 INJECTION, POWDER, LYOPHILIZED, FOR SOLUTION INTRAVENOUS EVERY 8 HOURS
Refills: 0 | Status: DISCONTINUED | OUTPATIENT
Start: 2020-08-07 | End: 2020-08-08

## 2020-08-07 RX ORDER — ACETAMINOPHEN 500 MG
1000 TABLET ORAL ONCE
Refills: 0 | Status: COMPLETED | OUTPATIENT
Start: 2020-08-07 | End: 2020-08-07

## 2020-08-07 RX ORDER — SODIUM CHLORIDE 9 MG/ML
1000 INJECTION, SOLUTION INTRAVENOUS ONCE
Refills: 0 | Status: COMPLETED | OUTPATIENT
Start: 2020-08-07 | End: 2020-08-07

## 2020-08-07 RX ORDER — PIPERACILLIN AND TAZOBACTAM 4; .5 G/20ML; G/20ML
3.38 INJECTION, POWDER, LYOPHILIZED, FOR SOLUTION INTRAVENOUS ONCE
Refills: 0 | Status: COMPLETED | OUTPATIENT
Start: 2020-08-07 | End: 2020-08-07

## 2020-08-07 RX ADMIN — PIPERACILLIN AND TAZOBACTAM 200 GRAM(S): 4; .5 INJECTION, POWDER, LYOPHILIZED, FOR SOLUTION INTRAVENOUS at 21:02

## 2020-08-07 RX ADMIN — Medication 400 MILLIGRAM(S): at 20:36

## 2020-08-07 RX ADMIN — HEPARIN SODIUM 5000 UNIT(S): 5000 INJECTION INTRAVENOUS; SUBCUTANEOUS at 05:58

## 2020-08-07 RX ADMIN — TAMSULOSIN HYDROCHLORIDE 0.4 MILLIGRAM(S): 0.4 CAPSULE ORAL at 12:19

## 2020-08-07 RX ADMIN — HEPARIN SODIUM 5000 UNIT(S): 5000 INJECTION INTRAVENOUS; SUBCUTANEOUS at 18:02

## 2020-08-07 RX ADMIN — SODIUM CHLORIDE 1000 MILLILITER(S): 9 INJECTION, SOLUTION INTRAVENOUS at 02:02

## 2020-08-07 RX ADMIN — OXYCODONE HYDROCHLORIDE 10 MILLIGRAM(S): 5 TABLET ORAL at 14:43

## 2020-08-07 RX ADMIN — CEFTRIAXONE 100 MILLIGRAM(S): 500 INJECTION, POWDER, FOR SOLUTION INTRAMUSCULAR; INTRAVENOUS at 18:01

## 2020-08-07 RX ADMIN — OXYCODONE HYDROCHLORIDE 10 MILLIGRAM(S): 5 TABLET ORAL at 13:04

## 2020-08-07 RX ADMIN — Medication 1 TABLET(S): at 12:19

## 2020-08-07 NOTE — PROGRESS NOTE ADULT - PROBLEM SELECTOR PLAN 1
- management as per urology  - follow up cultures from OR  - continue with current antibiotics - pt is not nursing/pumping so there are no limits on antibiotics which are safe

## 2020-08-07 NOTE — DISCHARGE NOTE NURSING/CASE MANAGEMENT/SOCIAL WORK - NSDPDISTO_GEN_ALL_CORE
Home/Pt   VS stable Afebrile. pt with positive bowel sounds capo po diet. Voiding without difficulty.

## 2020-08-07 NOTE — DISCHARGE NOTE NURSING/CASE MANAGEMENT/SOCIAL WORK - PATIENT PORTAL LINK FT
You can access the FollowMyHealth Patient Portal offered by [facility] by registering at the following website: [email or web address]. By joining [facility]’s FollowMyHealth portal, you will also be able to view your health information using other applications (apps) compatible with our system.

## 2020-08-07 NOTE — DISCHARGE NOTE NURSING/CASE MANAGEMENT/SOCIAL WORK - NSDCFUADDAPPT_GEN_ALL_CORE_FT
Follow-up with Dr. Jones  in the office as instructed for post-op check as well as with PMD for continuity of care.

## 2020-08-07 NOTE — PROGRESS NOTE ADULT - SUBJECTIVE AND OBJECTIVE BOX
[facility] Division of Hospital Medicine  Fanny Mari MD  Pager (M-F, 8A-5P): [phone]  Other Times:  [phone]    Patient is a 29y old  Female who presents with a chief complaint of left ureteral stone, fevers (07 Aug 2020 07:02)      SUBJECTIVE / OVERNIGHT EVENTS:  pt comfortable, not n/v, no fevers overnight.      MEDICATIONS  (STANDING):  cefTRIAXone   IVPB 1000 milliGRAM(s) IV Intermittent every 24 hours  heparin   Injectable 5000 Unit(s) SubCutaneous every 12 hours  lactated ringers. 1000 milliLiter(s) (125 mL/Hr) IV Continuous <Continuous>  prenatal multivitamin 1 Tablet(s) Oral daily  tamsulosin 0.4 milliGRAM(s) Oral daily    MEDICATIONS  (PRN):  oxyCODONE    IR 5 milliGRAM(s) Oral every 4 hours PRN Moderate Pain (4 - 6)  oxyCODONE    IR 10 milliGRAM(s) Oral every 4 hours PRN Severe Pain (7 - 10)  senna 2 Tablet(s) Oral at bedtime PRN Constipation      CAPILLARY BLOOD GLUCOSE    I&O's Summary    06 Aug 2020 07:01  -  07 Aug 2020 07:00  --------------------------------------------------------  IN: 250 mL / OUT: 1770 mL / NET: -1520 mL    PHYSICAL EXAM:  Vital Signs Last 24 Hrs  T(C): 37.1 (07 Aug 2020 08:45), Max: 37.3 (07 Aug 2020 05:57)  T(F): 98.8 (07 Aug 2020 08:45), Max: 99.1 (07 Aug 2020 05:57)  HR: 71 (07 Aug 2020 08:45) (45 - 71)  BP: 113/58 (07 Aug 2020 08:45) (107/60 - 128/68)  BP(mean): --  RR: 18 (07 Aug 2020 08:45) (16 - 18)  SpO2: 98% (07 Aug 2020 08:45) (98% - 99%)    CONSTITUTIONAL: NAD, well-developed, well-groomed  EYES: EOMI; conjunctiva and sclera clear  ENMT: Moist oral mucosa  RESPIRATORY: Normal respiratory effort; lungs are clear to auscultation bilaterally  CARDIOVASCULAR: Regular rate and rhythm, normal S1 and S2, no murmur; No lower extremity edema  ABDOMEN: Nontender to palpation, normoactive bowel sounds, no rebound/guarding  MUSCULOSKELETAL:   no clubbing or cyanosis of digits; no joint swelling or tenderness to palpation  PSYCH: A+O to person, place, and time; affect appropriate  NEUROLOGY: CN 2-12 are intact and symmetric; no gross sensory deficits   SKIN: No rashes; no palpable lesions    LABS:                        9.2    20.43 )-----------( 317      ( 07 Aug 2020 08:16 )             28.8     08-    142  |  105  |  7   ----------------------------<  113<H>  3.3<L>   |  21<L>  |  0.82    Ca    8.4      06 Aug 2020 05:57    TPro  6.8  /  Alb  3.7  /  TBili  0.4  /  DBili  x   /  AST  27  /  ALT  39<H>  /  AlkPhos  122<H>  08-06    PT/INR - ( 05 Aug 2020 17:00 )   PT: 14.7 SEC;   INR: 1.29          PTT - ( 05 Aug 2020 17:00 )  PTT:36.6 SEC      Urinalysis Basic - ( 05 Aug 2020 17:00 )    Color: LIGHT YELLOW / Appearance: Lt TURBID / S.011 / pH: 6.0  Gluc: NEGATIVE / Ketone: NEGATIVE  / Bili: NEGATIVE / Urobili: NORMAL   Blood: SMALL / Protein: 10 / Nitrite: NEGATIVE   Leuk Esterase: LARGE / RBC: 6-10 / WBC >50   Sq Epi: OCC / Non Sq Epi: x / Bacteria: MANY        Culture - Blood (collected 05 Aug 2020 21:12)  Source: .Blood Blood-Peripheral  Preliminary Report (06 Aug 2020 22:01):    No growth to date.    Culture - Blood (collected 05 Aug 2020 21:12)  Source: .Blood Blood-Peripheral  Preliminary Report (06 Aug 2020 22:01):    No growth to date.    Culture - Urine (collected 05 Aug 2020 21:10)  Source: .Urine Clean Catch (Midstream)  Final Report (07 Aug 2020 03:50):    <10,000 CFU/mL Normal Urogenital Lian      RADIOLOGY & ADDITIONAL TESTS:  Results Reviewed:   Imaging Personally Reviewed:  Electrocardiogram Personally Reviewed:    COORDINATION OF CARE:  Care Discussed with Consultants/Other Providers [Y/N]: Y  Prior or Outpatient Records Reviewed [Y/N]: Y

## 2020-08-07 NOTE — PROVIDER CONTACT NOTE (OTHER) - ASSESSMENT
Heart rate was 45 when vital signs were taken. Pt is asymptomatic. Denies SOB and chest pain. No acute distress noted.

## 2020-08-07 NOTE — DISCHARGE NOTE NURSING/CASE MANAGEMENT/SOCIAL WORK - NSDCPNINST_GEN_ALL_CORE
Schedule follow-up appointment(s) as instructed for stent removal in office. Notify physician for signs/symptoms of infection, including chills and/or fever greater than 101 deg F; nausea, vomiting, and/or diarrhea; increased pain and/or pain not relieved by medications; increased swelling, redness, and/or drainage at incision site(s). You may have intermittent blood tinged urine and slight flank pain when you urinate. This is normal and due to the stent in your ureter. If your urine becomes bright red or with clots, please call the office. No heavy lifting or straining for 2 to 4 weeks. Drink plenty of fluids and take over-the-counter stool softeners to prevent constipation, which can be a side effect of some pain medications.

## 2020-08-07 NOTE — DISCHARGE NOTE NURSING/CASE MANAGEMENT/SOCIAL WORK - NSDCPECAREGIVERED_GEN_ALL_CORE
Yes/Medline and carenotes for surgical procedure Ureteral stent as well as DC Medications and side effects literature for patient reference. No

## 2020-08-07 NOTE — DISCHARGE NOTE NURSING/CASE MANAGEMENT/SOCIAL WORK - NSDCPNDISPN_GEN_ALL_CORE
Side effects of pain management treatment/Opioids not applicable/not prescribed/Activities of daily living, including home environment that might     exacerbate pain or reduce effectiveness of the pain management plan of care as well as strategies to address these issues/Education provided on the pain management plan of care

## 2020-08-08 LAB
-  AMIKACIN: SIGNIFICANT CHANGE UP
-  AMOXICILLIN/CLAVULANIC ACID: SIGNIFICANT CHANGE UP
-  AMPICILLIN/SULBACTAM: SIGNIFICANT CHANGE UP
-  AMPICILLIN: SIGNIFICANT CHANGE UP
-  AZTREONAM: SIGNIFICANT CHANGE UP
-  CEFAZOLIN: SIGNIFICANT CHANGE UP
-  CEFEPIME: SIGNIFICANT CHANGE UP
-  CEFOXITIN: SIGNIFICANT CHANGE UP
-  CEFTRIAXONE: SIGNIFICANT CHANGE UP
-  CIPROFLOXACIN: SIGNIFICANT CHANGE UP
-  ERTAPENEM: SIGNIFICANT CHANGE UP
-  GENTAMICIN: SIGNIFICANT CHANGE UP
-  IMIPENEM: SIGNIFICANT CHANGE UP
-  LEVOFLOXACIN: SIGNIFICANT CHANGE UP
-  MEROPENEM: SIGNIFICANT CHANGE UP
-  NITROFURANTOIN: SIGNIFICANT CHANGE UP
-  PIPERACILLIN/TAZOBACTAM: SIGNIFICANT CHANGE UP
-  TIGECYCLINE: SIGNIFICANT CHANGE UP
-  TOBRAMYCIN: SIGNIFICANT CHANGE UP
-  TRIMETHOPRIM/SULFAMETHOXAZOLE: SIGNIFICANT CHANGE UP
ANION GAP SERPL CALC-SCNC: 15 MMO/L — HIGH (ref 7–14)
BUN SERPL-MCNC: 8 MG/DL — SIGNIFICANT CHANGE UP (ref 7–23)
CALCIUM SERPL-MCNC: 8.9 MG/DL — SIGNIFICANT CHANGE UP (ref 8.4–10.5)
CHLORIDE SERPL-SCNC: 103 MMOL/L — SIGNIFICANT CHANGE UP (ref 98–107)
CO2 SERPL-SCNC: 23 MMOL/L — SIGNIFICANT CHANGE UP (ref 22–31)
CREAT SERPL-MCNC: 0.78 MG/DL — SIGNIFICANT CHANGE UP (ref 0.5–1.3)
CULTURE RESULTS: NO GROWTH — SIGNIFICANT CHANGE UP
CULTURE RESULTS: SIGNIFICANT CHANGE UP
GLUCOSE SERPL-MCNC: 90 MG/DL — SIGNIFICANT CHANGE UP (ref 70–99)
HCT VFR BLD CALC: 34.4 % — LOW (ref 34.5–45)
HGB BLD-MCNC: 11 G/DL — LOW (ref 11.5–15.5)
MCHC RBC-ENTMCNC: 28.4 PG — SIGNIFICANT CHANGE UP (ref 27–34)
MCHC RBC-ENTMCNC: 32 % — SIGNIFICANT CHANGE UP (ref 32–36)
MCV RBC AUTO: 88.9 FL — SIGNIFICANT CHANGE UP (ref 80–100)
METHOD TYPE: SIGNIFICANT CHANGE UP
NRBC # FLD: 0 K/UL — SIGNIFICANT CHANGE UP (ref 0–0)
ORGANISM # SPEC MICROSCOPIC CNT: SIGNIFICANT CHANGE UP
ORGANISM # SPEC MICROSCOPIC CNT: SIGNIFICANT CHANGE UP
PLATELET # BLD AUTO: 393 K/UL — SIGNIFICANT CHANGE UP (ref 150–400)
PMV BLD: 11.3 FL — SIGNIFICANT CHANGE UP (ref 7–13)
POTASSIUM SERPL-MCNC: 4.1 MMOL/L — SIGNIFICANT CHANGE UP (ref 3.5–5.3)
POTASSIUM SERPL-SCNC: 4.1 MMOL/L — SIGNIFICANT CHANGE UP (ref 3.5–5.3)
RBC # BLD: 3.87 M/UL — SIGNIFICANT CHANGE UP (ref 3.8–5.2)
RBC # FLD: 14.4 % — SIGNIFICANT CHANGE UP (ref 10.3–14.5)
SODIUM SERPL-SCNC: 141 MMOL/L — SIGNIFICANT CHANGE UP (ref 135–145)
SPECIMEN SOURCE: SIGNIFICANT CHANGE UP
SPECIMEN SOURCE: SIGNIFICANT CHANGE UP
WBC # BLD: 14.12 K/UL — HIGH (ref 3.8–10.5)
WBC # FLD AUTO: 14.12 K/UL — HIGH (ref 3.8–10.5)

## 2020-08-08 PROCEDURE — 99232 SBSQ HOSP IP/OBS MODERATE 35: CPT

## 2020-08-08 RX ORDER — CIPROFLOXACIN LACTATE 400MG/40ML
400 VIAL (ML) INTRAVENOUS ONCE
Refills: 0 | Status: COMPLETED | OUTPATIENT
Start: 2020-08-08 | End: 2020-08-08

## 2020-08-08 RX ORDER — CIPROFLOXACIN LACTATE 400MG/40ML
400 VIAL (ML) INTRAVENOUS EVERY 12 HOURS
Refills: 0 | Status: DISCONTINUED | OUTPATIENT
Start: 2020-08-08 | End: 2020-08-09

## 2020-08-08 RX ORDER — ACETAMINOPHEN 500 MG
650 TABLET ORAL EVERY 6 HOURS
Refills: 0 | Status: DISCONTINUED | OUTPATIENT
Start: 2020-08-08 | End: 2020-08-09

## 2020-08-08 RX ORDER — CIPROFLOXACIN LACTATE 400MG/40ML
VIAL (ML) INTRAVENOUS
Refills: 0 | Status: DISCONTINUED | OUTPATIENT
Start: 2020-08-08 | End: 2020-08-09

## 2020-08-08 RX ADMIN — Medication 1 TABLET(S): at 11:48

## 2020-08-08 RX ADMIN — HEPARIN SODIUM 5000 UNIT(S): 5000 INJECTION INTRAVENOUS; SUBCUTANEOUS at 05:14

## 2020-08-08 RX ADMIN — Medication 650 MILLIGRAM(S): at 15:46

## 2020-08-08 RX ADMIN — OXYCODONE HYDROCHLORIDE 5 MILLIGRAM(S): 5 TABLET ORAL at 05:31

## 2020-08-08 RX ADMIN — HEPARIN SODIUM 5000 UNIT(S): 5000 INJECTION INTRAVENOUS; SUBCUTANEOUS at 18:29

## 2020-08-08 RX ADMIN — TAMSULOSIN HYDROCHLORIDE 0.4 MILLIGRAM(S): 0.4 CAPSULE ORAL at 11:48

## 2020-08-08 RX ADMIN — OXYCODONE HYDROCHLORIDE 5 MILLIGRAM(S): 5 TABLET ORAL at 06:15

## 2020-08-08 RX ADMIN — Medication 200 MILLIGRAM(S): at 08:34

## 2020-08-08 RX ADMIN — Medication 200 MILLIGRAM(S): at 18:29

## 2020-08-08 RX ADMIN — Medication 650 MILLIGRAM(S): at 16:19

## 2020-08-08 RX ADMIN — PIPERACILLIN AND TAZOBACTAM 25 GRAM(S): 4; .5 INJECTION, POWDER, LYOPHILIZED, FOR SOLUTION INTRAVENOUS at 05:14

## 2020-08-08 NOTE — PROGRESS NOTE ADULT - PROBLEM SELECTOR PLAN 1
- management as per urology  - Sensitivities back, patient will be switched to oral antibiotics and if fever curve improves, she may be discharged.

## 2020-08-08 NOTE — PROGRESS NOTE ADULT - SUBJECTIVE AND OBJECTIVE BOX
Overnight events:  Pt febrile to 101.5 at 7pm, BP stable, abx changed to zosyn    Subjective:  Pt offers no complaints    Objective:    Vital signs  T(C): , Max: 38.6 (08-07-20 @ 18:37)  HR: 72 (08-08-20 @ 05:12)  BP: 122/69 (08-08-20 @ 05:12)  SpO2: 99% (08-08-20 @ 05:12)  Wt(kg): --    Output   Void: 1600  08-07 @ 07:01  -  08-08 @ 07:00  --------------------------------------------------------  IN: 1000 mL / OUT: 4400 mL / NET: -3400 mL        Gen: NAD  Abd: soft, nontender, no CVAT      Labs                        11.0   14.12 )-----------( 393      ( 08 Aug 2020 05:41 )             34.4     08 Aug 2020 05:41    141    |  103    |  8      ----------------------------<  90     4.1     |  23     |  0.78     Ca    8.9        08 Aug 2020 05:41        Urine Cx:   Culture - Urine (08.06.20 @ 08:04)    -  Trimethoprim/Sulfamethoxazole: R >2/38    -  Tobramycin: S <=2    -  Tigecycline: S <=2    -  Piperacillin/Tazobactam: I 64    -  Nitrofurantoin: S <=32 Should not be used to treat pyelonephritis    -  Meropenem: S <=1    -  Levofloxacin: S <=0.5    -  Imipenem: S <=1    -  Gentamicin: S <=2    -  Ciprofloxacin: S <=0.25    -  Ertapenem: S <=0.5    -  Ceftriaxone: S <=1 Enterobacter, Citrobacter, and Serratia may develop resistance during prolonged therapy    -  Cefoxitin: S <=8    -  Cefepime: S <=2    -  Cefazolin: S 16 (MIC_CL_COM_ENTERIC_CEFAZU) For uncomplicated UTI with K. pneumoniae, E. coli, or P. mirablis: SHANTEL <=16 is sensitive and SHANTEL >=32 is resistant. This also predicts results for oral agents cefaclor, cefdinir, cefpodoxime, cefprozil, cefuroxime axetil, cephalexin and locarbef for uncomplicated UTI. Note that some isolates may be susceptible to these agents while testing resistant to cefazolin.    -  Aztreonam: S <=4    -  Ampicillin/Sulbactam: R >16/8 Enterobacter, Citrobacter, and Serratia may develop resistance during prolonged therapy (3-4 days)    -  Ampicillin: R >16 These ampicillin results predict results for amoxicillin    -  Amikacin: S <=16    -  Amoxicillin/Clavulanic Acid: I 16/8    Specimen Source: .Urine LEFT KIDNEY    Culture Results:   10,000 - 49,000 CFU/mL Escherichia coli  <10,000 CFU/ml Normal Urogenital vladimir present    Organism Identification: Escherichia coli    Organism: Escherichia coli    Method Type: SHANTEL      Blood Cx:   Culture - Blood (08.05.20 @ 21:12)    Specimen Source: .Blood Blood-Peripheral    Culture Results:   No growth to date.        Imaging

## 2020-08-08 NOTE — PROGRESS NOTE ADULT - ASSESSMENT
30 yo F admitted w/ fevers, obstructing left ureteral calculus 8/5, underwent ureteral stent placement 8/7, Ucx neg, Bcx NGTD, OR culture Ecoli    Plan:  - AM labs reviewed  - IVL  - change abx to cipro  - monitor fevers  - f/u medicine  - DVT prophy, IS, OOB, ambulate

## 2020-08-08 NOTE — PROGRESS NOTE ADULT - SUBJECTIVE AND OBJECTIVE BOX
PROGRESS NOTE:     Patient is a 29y old  Female who presents with a chief complaint of left ureteral stone, fevers (08 Aug 2020 07:48)      SUBJECTIVE / OVERNIGHT EVENTS:  Patient seen and examined this morning. Denies any chest pain, shortness of breath, nausea or vomiting. Had fevers yesterday, but denies feeling feverish today.    ADDITIONAL REVIEW OF SYSTEMS:    MEDICATIONS  (STANDING):  ciprofloxacin   IVPB      ciprofloxacin   IVPB 400 milliGRAM(s) IV Intermittent every 12 hours  heparin   Injectable 5000 Unit(s) SubCutaneous every 12 hours  prenatal multivitamin 1 Tablet(s) Oral daily  tamsulosin 0.4 milliGRAM(s) Oral daily    MEDICATIONS  (PRN):  oxyCODONE    IR 5 milliGRAM(s) Oral every 4 hours PRN Moderate Pain (4 - 6)  oxyCODONE    IR 10 milliGRAM(s) Oral every 4 hours PRN Severe Pain (7 - 10)  senna 2 Tablet(s) Oral at bedtime PRN Constipation      CAPILLARY BLOOD GLUCOSE        I&O's Summary    07 Aug 2020 07:01  -  08 Aug 2020 07:00  --------------------------------------------------------  IN: 1000 mL / OUT: 4400 mL / NET: -3400 mL    08 Aug 2020 07:01  -  08 Aug 2020 15:15  --------------------------------------------------------  IN: 0 mL / OUT: 2400 mL / NET: -2400 mL        PHYSICAL EXAM:  Vital Signs Last 24 Hrs  T(C): 38.5 (08 Aug 2020 15:00), Max: 38.6 (07 Aug 2020 18:37)  T(F): 101.3 (08 Aug 2020 15:00), Max: 101.5 (07 Aug 2020 18:37)  HR: 89 (08 Aug 2020 15:00) (59 - 109)  BP: 102/63 (08 Aug 2020 15:00) (102/63 - 122/69)  BP(mean): --  RR: 18 (08 Aug 2020 15:00) (18 - 18)  SpO2: 98% (08 Aug 2020 15:00) (97% - 100%)    CONSTITUTIONAL: NAD, well-developed  RESPIRATORY: Normal respiratory effort; lungs are clear to auscultation bilaterally  CARDIOVASCULAR: Regular rate and rhythm, normal S1 and S2, no murmur/rub/gallop; No lower extremity edema; Peripheral pulses are 2+ bilaterally  ABDOMEN: Nontender to palpation, normoactive bowel sounds, no rebound/guarding; No hepatosplenomegaly  MUSCLOSKELETAL: no clubbing or cyanosis of digits; no joint swelling or tenderness to palpation  PSYCH: A+O to person, place, and time; affect appropriate    LABS:                        11.0   14.12 )-----------( 393      ( 08 Aug 2020 05:41 )             34.4     08-08    141  |  103  |  8   ----------------------------<  90  4.1   |  23  |  0.78    Ca    8.9      08 Aug 2020 05:41                Culture - Urine (collected 06 Aug 2020 08:04)  Source: .Urine LEFT KIDNEY  Final Report (08 Aug 2020 07:27):    10,000 - 49,000 CFU/mL Escherichia coli    <10,000 CFU/ml Normal Urogenital vladimir present  Organism: Escherichia coli (08 Aug 2020 07:27)  Organism: Escherichia coli (08 Aug 2020 07:27)    Culture - Blood (collected 05 Aug 2020 21:12)  Source: .Blood Blood-Peripheral  Preliminary Report (06 Aug 2020 22:01):    No growth to date.    Culture - Blood (collected 05 Aug 2020 21:12)  Source: .Blood Blood-Peripheral  Preliminary Report (06 Aug 2020 22:01):    No growth to date.    Culture - Urine (collected 05 Aug 2020 21:10)  Source: .Urine Clean Catch (Midstream)  Final Report (07 Aug 2020 03:50):    <10,000 CFU/mL Normal Urogenital Vladimir        RADIOLOGY & ADDITIONAL TESTS:  Results Reviewed:   Imaging Personally Reviewed:  Electrocardiogram Personally Reviewed:    COORDINATION OF CARE:  Care Discussed with Consultants/Other Providers [Y/N]:  Prior or Outpatient Records Reviewed [Y/N]:

## 2020-08-08 NOTE — PROGRESS NOTE ADULT - ATTENDING COMMENTS
Pt seen/examined.  Case discussed with housestaff/PA team.  Agree with above note history, physical and assessment/plan.  Cx resulted today - switch to PO abx  Trend fever curve  Pt appears well and nontoxic  If fever curve downtrends will consider discharge later today  D/w pt that she will require f/u for definitive stone procedure at later date

## 2020-08-09 ENCOUNTER — TRANSCRIPTION ENCOUNTER (OUTPATIENT)
Age: 30
End: 2020-08-09

## 2020-08-09 VITALS
OXYGEN SATURATION: 99 % | HEART RATE: 83 BPM | SYSTOLIC BLOOD PRESSURE: 103 MMHG | RESPIRATION RATE: 16 BRPM | DIASTOLIC BLOOD PRESSURE: 63 MMHG | TEMPERATURE: 99 F

## 2020-08-09 PROCEDURE — 99232 SBSQ HOSP IP/OBS MODERATE 35: CPT

## 2020-08-09 RX ORDER — CIPROFLOXACIN LACTATE 400MG/40ML
1 VIAL (ML) INTRAVENOUS
Qty: 20 | Refills: 0
Start: 2020-08-09 | End: 2020-08-18

## 2020-08-09 RX ORDER — TAMSULOSIN HYDROCHLORIDE 0.4 MG/1
1 CAPSULE ORAL
Qty: 30 | Refills: 0
Start: 2020-08-09 | End: 2020-09-07

## 2020-08-09 RX ORDER — TAMSULOSIN HYDROCHLORIDE 0.4 MG/1
1 CAPSULE ORAL
Qty: 0 | Refills: 0 | DISCHARGE
Start: 2020-08-09

## 2020-08-09 RX ADMIN — Medication 200 MILLIGRAM(S): at 05:59

## 2020-08-09 RX ADMIN — Medication 1 TABLET(S): at 13:26

## 2020-08-09 RX ADMIN — HEPARIN SODIUM 5000 UNIT(S): 5000 INJECTION INTRAVENOUS; SUBCUTANEOUS at 05:59

## 2020-08-09 RX ADMIN — TAMSULOSIN HYDROCHLORIDE 0.4 MILLIGRAM(S): 0.4 CAPSULE ORAL at 13:26

## 2020-08-09 NOTE — PROGRESS NOTE ADULT - REASON FOR ADMISSION
left ureteral stone, fevers

## 2020-08-09 NOTE — PROGRESS NOTE ADULT - SUBJECTIVE AND OBJECTIVE BOX
Overnight events:  Pt febrile to 101.3 at 3pm 8/8, abx changed to cipro    Subjective:  Pt offers no complaints    Subjective    Patient seen and examined.    Objective    Vital signs  T(F): 98.3, Max: 101.3 (08-08-20 @ 15:00)  HR: 79 (08-09-20 @ 05:57)  BP: 103/69 (08-09-20 @ 05:57)  SpO2: 100% (08-09-20 @ 05:57)  Wt(kg): --    Output   V 600  08-08 @ 07:01  -  08-09 @ 07:00  --------------------------------------------------------  IN: 0 mL / OUT: 3700 mL / NET: -3700 mL    General: NAD  Abdomen: soft/non-tender/non-distended. no CVAT      Labs    Blood Culture Results:   No growth to date. (08.07.20 @ 22:58)    Urine Culture Results:   No growth (08.07.20 @ 19:53)    Urine Cx:   Culture - Urine (08.06.20 @ 08:04)    -  Trimethoprim/Sulfamethoxazole: R >2/38    -  Tobramycin: S <=2    -  Tigecycline: S <=2    -  Piperacillin/Tazobactam: I 64    -  Nitrofurantoin: S <=32 Should not be used to treat pyelonephritis    -  Meropenem: S <=1    -  Levofloxacin: S <=0.5    -  Imipenem: S <=1    -  Gentamicin: S <=2    -  Ciprofloxacin: S <=0.25    -  Ertapenem: S <=0.5    -  Ceftriaxone: S <=1 Enterobacter, Citrobacter, and Serratia may develop resistance during prolonged therapy    -  Cefoxitin: S <=8    -  Cefepime: S <=2    -  Cefazolin: S 16 (MIC_CL_COM_ENTERIC_CEFAZU) For uncomplicated UTI with K. pneumoniae, E. coli, or P. mirablis: SHANTEL <=16 is sensitive and SHANTEL >=32 is resistant. This also predicts results for oral agents cefaclor, cefdinir, cefpodoxime, cefprozil, cefuroxime axetil, cephalexin and locarbef for uncomplicated UTI. Note that some isolates may be susceptible to these agents while testing resistant to cefazolin.    -  Aztreonam: S <=4    -  Ampicillin/Sulbactam: R >16/8 Enterobacter, Citrobacter, and Serratia may develop resistance during prolonged therapy (3-4 days)    -  Ampicillin: R >16 These ampicillin results predict results for amoxicillin    -  Amikacin: S <=16    -  Amoxicillin/Clavulanic Acid: I 16/8    Specimen Source: .Urine LEFT KIDNEY    Culture Results:   10,000 - 49,000 CFU/mL Escherichia coli  <10,000 CFU/ml Normal Urogenital vladimir present    Organism Identification: Escherichia coli    Organism: Escherichia coli    Method Type: SHANTEL    Imaging- no imaging for review

## 2020-08-09 NOTE — PROGRESS NOTE ADULT - SUBJECTIVE AND OBJECTIVE BOX
PROGRESS NOTE:     Patient is a 29y old  Female who presents with a chief complaint of left ureteral stone, fevers (09 Aug 2020 08:02)      SUBJECTIVE / OVERNIGHT EVENTS:  Patient seen and examined this morning. Overall fever curve continues to improve.   ADDITIONAL REVIEW OF SYSTEMS:  Denies any chest pain, shortness of breath, nausea or vomiting.      MEDICATIONS  (STANDING):  ciprofloxacin   IVPB      ciprofloxacin   IVPB 400 milliGRAM(s) IV Intermittent every 12 hours  heparin   Injectable 5000 Unit(s) SubCutaneous every 12 hours  prenatal multivitamin 1 Tablet(s) Oral daily  tamsulosin 0.4 milliGRAM(s) Oral daily    MEDICATIONS  (PRN):  acetaminophen   Tablet .. 650 milliGRAM(s) Oral every 6 hours PRN Temp greater or equal to 38C (100.4F)  oxyCODONE    IR 5 milliGRAM(s) Oral every 4 hours PRN Moderate Pain (4 - 6)  oxyCODONE    IR 10 milliGRAM(s) Oral every 4 hours PRN Severe Pain (7 - 10)  senna 2 Tablet(s) Oral at bedtime PRN Constipation      CAPILLARY BLOOD GLUCOSE        I&O's Summary    08 Aug 2020 07:01  -  09 Aug 2020 07:00  --------------------------------------------------------  IN: 0 mL / OUT: 3700 mL / NET: -3700 mL        PHYSICAL EXAM:  Vital Signs Last 24 Hrs  T(C): 36.8 (09 Aug 2020 05:57), Max: 38.5 (08 Aug 2020 15:00)  T(F): 98.2 (09 Aug 2020 05:57), Max: 101.3 (08 Aug 2020 15:00)  HR: 79 (09 Aug 2020 05:57) (61 - 93)  BP: 103/69 (09 Aug 2020 05:57) (99/51 - 112/52)  BP(mean): --  RR: 17 (09 Aug 2020 05:57) (16 - 18)  SpO2: 100% (09 Aug 2020 05:57) (97% - 100%)    CONSTITUTIONAL: NAD, well-developed  RESPIRATORY: Normal respiratory effort; lungs are clear to auscultation bilaterally  CARDIOVASCULAR: Regular rate and rhythm, normal S1 and S2, no murmur/rub/gallop; No lower extremity edema; Peripheral pulses are 2+ bilaterally  ABDOMEN: Nontender to palpation, normoactive bowel sounds, no rebound/guarding; No hepatosplenomegaly  MUSCLOSKELETAL: no clubbing or cyanosis of digits; no joint swelling or tenderness to palpation  PSYCH: A+O to person, place, and time; affect appropriate    LABS:                        11.0   14.12 )-----------( 393      ( 08 Aug 2020 05:41 )             34.4     08-08    141  |  103  |  8   ----------------------------<  90  4.1   |  23  |  0.78    Ca    8.9      08 Aug 2020 05:41                Culture - Blood (collected 07 Aug 2020 22:58)  Source: .Blood Blood-Peripheral  Preliminary Report (08 Aug 2020 23:01):    No growth to date.    Culture - Blood (collected 07 Aug 2020 22:58)  Source: .Blood Blood-Venous  Preliminary Report (08 Aug 2020 23:01):    No growth to date.    Culture - Urine (collected 07 Aug 2020 19:53)  Source: .Urine Clean Catch (Midstream)  Final Report (08 Aug 2020 20:02):    No growth        RADIOLOGY & ADDITIONAL TESTS:  Results Reviewed:   Imaging Personally Reviewed:  Electrocardiogram Personally Reviewed:    COORDINATION OF CARE:  Care Discussed with Consultants/Other Providers [Y/N]:  Prior or Outpatient Records Reviewed [Y/N]:

## 2020-08-09 NOTE — DISCHARGE NOTE PROVIDER - NSDCCPCAREPLAN_GEN_ALL_CORE_FT
PRINCIPAL DISCHARGE DIAGNOSIS  Diagnosis: Nephrolithiasis  Assessment and Plan of Treatment:       SECONDARY DISCHARGE DIAGNOSES  Diagnosis: Acute pyelonephritis  Assessment and Plan of Treatment:

## 2020-08-09 NOTE — PROGRESS NOTE ADULT - ASSESSMENT
30 yo F admitted w/ fevers, obstructing left ureteral calculus 8/5, underwent ureteral stent placement 8/7, Ucx neg, Bcx NGTD, OR culture Ecoli    Plan:  - AM labs reviewed  - IVL  - cipro  - monitor fevers  - f/u medicine  - DVT prophy, IS, OOB, ambulate  -d/c home this afternoon if remains afebrile. home with 10 days of cipro

## 2020-08-09 NOTE — DISCHARGE NOTE PROVIDER - NSDCFUADDINST_GEN_ALL_CORE_FT
Take ciprofloxacin 500 mg by mouth 2 times a day for ten days.     Continue taking Flomax 0.4 mg daily for ureteral stent colic pain.

## 2020-08-09 NOTE — PROGRESS NOTE ADULT - PROBLEM SELECTOR PLAN 1
- management as per urology  - Sensitivities back, patient on cipro. If fevers curve continues to improve patient will be discharged on oral antibiotics.

## 2020-08-09 NOTE — DISCHARGE NOTE PROVIDER - CARE PROVIDER_API CALL
Omkar Jones  UROLOGY  [address] [place] [address]  Phone: [phone]  Fax: [phone]  Follow Up Time: 1 week

## 2020-08-09 NOTE — DISCHARGE NOTE PROVIDER - HOSPITAL COURSE
Patient presented to ED on 8/5/20 for urosepsis 2/2 left ureteral stones s/p L ureteral stent placed 8/6/20 by Dr. Jones. The patient tolerated the procedure well. There were no complications. The patient was extubated in the OR and transferred to the PACU in stable condition and transferred to the urology floor. Once bowel function returned, diet was advanced as tolerated. The patient's pain was controlled by IV pain medications and then by PO pain medications. The patient was placed back on home medications.     Fevers and pain improved with IV antibiotics. Patient has been afebrile for more than 24 hours prior to discharge.    At the time of discharge, the patient was hemodynamically stable, was tolerating PO diet, was voiding urine and passing stool, was ambulating, and was comfortable with adequate pain control. The patient was instructed to follow up with Dr. Jones within 1-2 weeks after discharge from the hospital. The patient felt comfortable with discharge. The patient was discharged to home. The patient had no other issues.

## 2020-08-09 NOTE — DISCHARGE NOTE PROVIDER - NSDCMRMEDTOKEN_GEN_ALL_CORE_FT
acetaminophen 325 mg oral tablet: 3 tab(s) orally   ciprofloxacin: 500 milligram(s) orally 2 times a day  folic acid 1 mg oral tablet: 1 tab(s) orally once a day  ibuprofen 600 mg oral tablet: 1 tab(s) orally every 6 hours, As Needed  ibuprofen 600 mg oral tablet: 1 tab(s) orally every 6 hours, As Needed moderate pain, take with food.   oxyCODONE 5 mg oral tablet: 1 tab(s) orally every 4 hours, As needed, Moderate Pain (4 - 6) MDD:6  PNV Prenatal oral tablet: 1 tab(s) orally once a day  tamsulosin 0.4 mg oral capsule: 1 cap(s) orally once a day acetaminophen 325 mg oral tablet: 3 tab(s) orally   ciprofloxacin: 500 milligram(s) orally 2 times a day  ciprofloxacin 500 mg oral tablet: 1 tab(s) orally 2 times a day   folic acid 1 mg oral tablet: 1 tab(s) orally once a day  ibuprofen 600 mg oral tablet: 1 tab(s) orally every 6 hours, As Needed  ibuprofen 600 mg oral tablet: 1 tab(s) orally every 6 hours, As Needed moderate pain, take with food.   oxyCODONE 5 mg oral tablet: 1 tab(s) orally every 4 hours, As needed, Moderate Pain (4 - 6) MDD:6  PNV Prenatal oral tablet: 1 tab(s) orally once a day  tamsulosin 0.4 mg oral capsule: 1 cap(s) orally once a day  tamsulosin 0.4 mg oral capsule: 1 cap(s) orally once a day

## 2020-08-10 RX ORDER — CIPROFLOXACIN LACTATE 400MG/40ML
500 VIAL (ML) INTRAVENOUS
Qty: 0 | Refills: 0 | DISCHARGE
Start: 2020-08-10 | End: 2020-08-21

## 2020-08-17 ENCOUNTER — MED ADMIN CHARGE (OUTPATIENT)
Age: 30
End: 2020-08-17

## 2020-08-17 ENCOUNTER — APPOINTMENT (OUTPATIENT)
Dept: UROLOGY | Facility: CLINIC | Age: 30
End: 2020-08-17
Payer: MEDICAID

## 2020-08-17 ENCOUNTER — APPOINTMENT (OUTPATIENT)
Dept: OBGYN | Facility: HOSPITAL | Age: 30
End: 2020-08-17

## 2020-08-17 ENCOUNTER — OUTPATIENT (OUTPATIENT)
Dept: OUTPATIENT SERVICES | Facility: HOSPITAL | Age: 30
LOS: 1 days | End: 2020-08-17

## 2020-08-17 VITALS
BODY MASS INDEX: 28.51 KG/M2 | DIASTOLIC BLOOD PRESSURE: 58 MMHG | HEIGHT: 61 IN | SYSTOLIC BLOOD PRESSURE: 112 MMHG | HEART RATE: 56 BPM | WEIGHT: 151 LBS | TEMPERATURE: 98.9 F

## 2020-08-17 VITALS
OXYGEN SATURATION: 100 % | TEMPERATURE: 98 F | HEART RATE: 57 BPM | RESPIRATION RATE: 17 BRPM | SYSTOLIC BLOOD PRESSURE: 120 MMHG | BODY MASS INDEX: 27.95 KG/M2 | WEIGHT: 148 LBS | DIASTOLIC BLOOD PRESSURE: 72 MMHG

## 2020-08-17 DIAGNOSIS — Z98.891 HISTORY OF UTERINE SCAR FROM PREVIOUS SURGERY: Chronic | ICD-10-CM

## 2020-08-17 DIAGNOSIS — N20.1 CALCULUS OF URETER: ICD-10-CM

## 2020-08-17 PROCEDURE — 99213 OFFICE O/P EST LOW 20 MIN: CPT

## 2020-08-17 NOTE — HISTORY OF PRESENT ILLNESS
[Delivery Date: ___] : on [unfilled] [Primary C/S] : delivered by  section [Wt. ___] : weighing [unfilled] [Male] : Delivery History: baby boy [Pertussis Vaccine] : Pertussis vaccine administered [Intended Contraception] : Intended Contraception: [Last Pap Date: ___] : Last Pap Date: [unfilled] [Rhogam] : Rhogam was not administered [BTL] : no tubal ligation [Rubella Vaccine] : Rubella vaccine was not administered [Breastfeeding] : not currently nursing [Resumed Menses] : has not resumed her menses [Clean/Dry/Intact] : clean, dry and intact [Resumed [place]] : has not resumed intercourse [Swelling] : not swollen [Erythema] : not erythematous [Dehiscence] : not dehisced [Back to Normal] : is back to normal in size [None] : no vaginal bleeding [Examination Of The Breasts] : breasts are normal [Normal] : the vagina was normal [FreeTextEntry8] : I am here for a check up INT # [id number] [FreeTextEntry9] : renal calculi, pyelonephritis, laroroscopic cholecystecomy was on ursodiol for pruritis [de-identified] : pt had depoprovera post delivery would like nexplonon [de-identified] : incisional pain [de-identified] : make appt for nexplonon , colpo 8/20/20  MMR today. pt told of chance of birth defects if she gets pregnant within 3 month of MMR [de-identified] : s/p c section healing well rubeola NI didn’t have post partum vaccine abnormal pap

## 2020-08-17 NOTE — REASON FOR VISIT
[Follow-up Visit ___] : a follow-up visit  for [unfilled] [[employer] ] : provided by [employer]   [FreeTextEntry1] : [id number] [TWNoteComboBox1] : [place]

## 2020-08-17 NOTE — HISTORY OF PRESENT ILLNESS
[FreeTextEntry1] : Patient is a 28 yo F who presents for f/u.\par Recent admission for sepsis 2/2 L obstructing ureteral stone. S/p L ureteral stent.\par Urine cx E Coli. from hospital.\par Here to set up definitive L URS for her stone.\par Her insurance runs out at end of this month.\par She is doing well after L ureteral stent.  No significant pain or dysuria.  No fever/chills.

## 2020-08-17 NOTE — PHYSICAL EXAM
[General Appearance - Well Developed] : well developed [General Appearance - Well Nourished] : well nourished [Normal Appearance] : normal appearance [Well Groomed] : well groomed [General Appearance - In No Acute Distress] : no acute distress [Exaggerated Use Of Accessory Muscles For Inspiration] : no accessory muscle use [] : no respiratory distress [Respiration, Rhythm And Depth] : normal respiratory rhythm and effort [Oriented To Time, Place, And Person] : oriented to person, place, and time [Mood] : the mood was normal [Affect] : the affect was normal [Not Anxious] : not anxious

## 2020-08-17 NOTE — ASSESSMENT
[FreeTextEntry1] : Patient is a 30 yo F who presents for L ureteral stone with fever s/p L ureteral stent.\par \par -OR scheduled for Aug 27th\par -COVID testing 3 days prior\par -Procedure reviewed and all questions answered\par -Repeat urine cx today

## 2020-08-18 ENCOUNTER — APPOINTMENT (OUTPATIENT)
Age: 30
End: 2020-08-18

## 2020-08-18 LAB — BACTERIA UR CULT: NORMAL

## 2020-08-20 ENCOUNTER — APPOINTMENT (OUTPATIENT)
Dept: OBGYN | Facility: HOSPITAL | Age: 30
End: 2020-08-20
Payer: MEDICAID

## 2020-08-20 ENCOUNTER — OUTPATIENT (OUTPATIENT)
Dept: OUTPATIENT SERVICES | Facility: HOSPITAL | Age: 30
LOS: 1 days | End: 2020-08-20
Payer: MEDICAID

## 2020-08-20 ENCOUNTER — RESULT REVIEW (OUTPATIENT)
Age: 30
End: 2020-08-20

## 2020-08-20 VITALS
TEMPERATURE: 98.4 F | HEIGHT: 63 IN | BODY MASS INDEX: 26.05 KG/M2 | DIASTOLIC BLOOD PRESSURE: 60 MMHG | SYSTOLIC BLOOD PRESSURE: 101 MMHG | WEIGHT: 147 LBS | HEART RATE: 67 BPM

## 2020-08-20 DIAGNOSIS — Z23 ENCOUNTER FOR IMMUNIZATION: ICD-10-CM

## 2020-08-20 DIAGNOSIS — Z98.891 HISTORY OF UTERINE SCAR FROM PREVIOUS SURGERY: Chronic | ICD-10-CM

## 2020-08-20 LAB
HCG UR QL: NEGATIVE
QUALITY CONTROL: YES

## 2020-08-20 PROCEDURE — 57454 BX/CURETT OF CERVIX W/SCOPE: CPT | Mod: GC

## 2020-08-20 PROCEDURE — 88305 TISSUE EXAM BY PATHOLOGIST: CPT | Mod: 26

## 2020-08-20 RX ADMIN — IBUPROFEN 0 MG: 600 TABLET ORAL at 00:00

## 2020-08-21 DIAGNOSIS — Z00.00 ENCOUNTER FOR GENERAL ADULT MEDICAL EXAMINATION WITHOUT ABNORMAL FINDINGS: ICD-10-CM

## 2020-08-21 DIAGNOSIS — R87.610 ATYPICAL SQUAMOUS CELLS OF UNDETERMINED SIGNIFICANCE ON CYTOLOGIC SMEAR OF CERVIX (ASC-US): ICD-10-CM

## 2020-08-22 DIAGNOSIS — Z01.818 ENCOUNTER FOR OTHER PREPROCEDURAL EXAMINATION: ICD-10-CM

## 2020-08-24 ENCOUNTER — APPOINTMENT (OUTPATIENT)
Dept: DISASTER EMERGENCY | Facility: CLINIC | Age: 30
End: 2020-08-24

## 2020-08-25 ENCOUNTER — OUTPATIENT (OUTPATIENT)
Dept: OUTPATIENT SERVICES | Facility: HOSPITAL | Age: 30
LOS: 1 days | End: 2020-08-25
Payer: MEDICAID

## 2020-08-25 VITALS
SYSTOLIC BLOOD PRESSURE: 117 MMHG | TEMPERATURE: 98 F | WEIGHT: 147.93 LBS | HEIGHT: 61.02 IN | OXYGEN SATURATION: 98 % | HEART RATE: 77 BPM | RESPIRATION RATE: 18 BRPM | DIASTOLIC BLOOD PRESSURE: 79 MMHG

## 2020-08-25 DIAGNOSIS — Z98.891 HISTORY OF UTERINE SCAR FROM PREVIOUS SURGERY: Chronic | ICD-10-CM

## 2020-08-25 DIAGNOSIS — Z90.49 ACQUIRED ABSENCE OF OTHER SPECIFIED PARTS OF DIGESTIVE TRACT: Chronic | ICD-10-CM

## 2020-08-25 DIAGNOSIS — N20.1 CALCULUS OF URETER: ICD-10-CM

## 2020-08-25 DIAGNOSIS — Z01.818 ENCOUNTER FOR OTHER PREPROCEDURAL EXAMINATION: ICD-10-CM

## 2020-08-25 DIAGNOSIS — N20.0 CALCULUS OF KIDNEY: Chronic | ICD-10-CM

## 2020-08-25 LAB
ANION GAP SERPL CALC-SCNC: 13 MMOL/L — SIGNIFICANT CHANGE UP (ref 5–17)
BUN SERPL-MCNC: 14 MG/DL — SIGNIFICANT CHANGE UP (ref 7–23)
CALCIUM SERPL-MCNC: 9.7 MG/DL — SIGNIFICANT CHANGE UP (ref 8.4–10.5)
CHLORIDE SERPL-SCNC: 105 MMOL/L — SIGNIFICANT CHANGE UP (ref 96–108)
CO2 SERPL-SCNC: 23 MMOL/L — SIGNIFICANT CHANGE UP (ref 22–31)
CREAT SERPL-MCNC: 0.68 MG/DL — SIGNIFICANT CHANGE UP (ref 0.5–1.3)
GLUCOSE SERPL-MCNC: 75 MG/DL — SIGNIFICANT CHANGE UP (ref 70–99)
HCT VFR BLD CALC: 35.8 % — SIGNIFICANT CHANGE UP (ref 34.5–45)
HGB BLD-MCNC: 11.2 G/DL — LOW (ref 11.5–15.5)
MCHC RBC-ENTMCNC: 27.9 PG — SIGNIFICANT CHANGE UP (ref 27–34)
MCHC RBC-ENTMCNC: 31.3 GM/DL — LOW (ref 32–36)
MCV RBC AUTO: 89.3 FL — SIGNIFICANT CHANGE UP (ref 80–100)
NRBC # BLD: 0 /100 WBCS — SIGNIFICANT CHANGE UP (ref 0–0)
PLATELET # BLD AUTO: 264 K/UL — SIGNIFICANT CHANGE UP (ref 150–400)
POTASSIUM SERPL-MCNC: 4.1 MMOL/L — SIGNIFICANT CHANGE UP (ref 3.5–5.3)
POTASSIUM SERPL-SCNC: 4.1 MMOL/L — SIGNIFICANT CHANGE UP (ref 3.5–5.3)
RBC # BLD: 4.01 M/UL — SIGNIFICANT CHANGE UP (ref 3.8–5.2)
RBC # FLD: 15.9 % — HIGH (ref 10.3–14.5)
SARS-COV-2 N GENE NPH QL NAA+PROBE: NOT DETECTED
SODIUM SERPL-SCNC: 141 MMOL/L — SIGNIFICANT CHANGE UP (ref 135–145)
WBC # BLD: 7.34 K/UL — SIGNIFICANT CHANGE UP (ref 3.8–10.5)
WBC # FLD AUTO: 7.34 K/UL — SIGNIFICANT CHANGE UP (ref 3.8–10.5)

## 2020-08-25 PROCEDURE — G0463: CPT

## 2020-08-25 PROCEDURE — 87086 URINE CULTURE/COLONY COUNT: CPT

## 2020-08-25 PROCEDURE — 80048 BASIC METABOLIC PNL TOTAL CA: CPT

## 2020-08-25 PROCEDURE — 85027 COMPLETE CBC AUTOMATED: CPT

## 2020-08-25 RX ORDER — CEFAZOLIN SODIUM 1 G
2000 VIAL (EA) INJECTION ONCE
Refills: 0 | Status: DISCONTINUED | OUTPATIENT
Start: 2020-08-27 | End: 2020-09-11

## 2020-08-25 NOTE — PROGRESS NOTE ADULT - PROBLEM/PLAN-1
no edema, no murmurs, regular rate and rhythm
DISPLAY PLAN FREE TEXT

## 2020-08-25 NOTE — H&P PST ADULT - HISTORY OF PRESENT ILLNESS
This is a 29 year old Venezuelan speaking female     28yo G1 at 37.3 wks with recurrent UTIs, who's previously been admitted to the MICU for plyeonephritis and sepsis. Now she has a clinical plyeo, with E.Coli UTI, causing sepsis. On my limited interview, while she speaks Citizen of [place] primarily, there were no acute concerns. In speaking with the RN and Resident, there are no concerns with fetal tracings.     ED on 8/5/20 for urosepsis 2/2 left ureteral stones s/p L ureteral stent placed 8/6/20 by Dr. Jones.    29F with no medical history, s/p c section approximately 5 weeks ago who presents with left flank pain. Known left sided ureteral stones. Fever to 101.8 in the ED. WBC 14 and CT showing multiple obstructing left midureteral stones with hydro.              **Follow up Covid testing performed on 8/24 @ [facility] This is a 29 year old Slovenian speaking female S/P C-s section on June 22, 20' @ [facility],  previous admisssion to MICU for Pyleo and urosepsis S/P cystoscopy and left ureteral stent insertion with Dr. Jones on 8/6/20. Pt is now presenting to [facility] for scheduled for cystoscopy, left urethroscopy laser lithotripsy, stone extraction and stent removal on 8/27 with Dr. Jones      **Follow up Covid testing performed on 8/24 @ [facility]

## 2020-08-25 NOTE — H&P PST ADULT - NSICDXPROBLEM_GEN_ALL_CORE_FT
PROBLEM DIAGNOSES  Problem: Calculus of ureter  Assessment and Plan: Scheduled for cytoscopy, left urteroscopy, lasert lithotripsy, stone extractionand stent removal  Preop instructions  Labs CBC BMP, urine culture  Covid test performed on 8/24 @ [facility]  UCG DOS PROBLEM DIAGNOSES  Problem: Calculus of ureter  Assessment and Plan: Scheduled for cytoscopy, left urteroscopy, laser lithotripsy, stone extraction and stent removal  Preop instructions  Labs CBC BMP, urine culture  Covid test performed on 8/24 @ [facility]  UCG DOS

## 2020-08-25 NOTE — H&P PST ADULT - NSANTHOSAYNRD_GEN_A_CORE
No. MINI screening performed.  STOP BANG Legend: 0-2 = LOW Risk; 3-4 = INTERMEDIATE Risk; 5-8 = HIGH Risk

## 2020-08-26 ENCOUNTER — TRANSCRIPTION ENCOUNTER (OUTPATIENT)
Age: 30
End: 2020-08-26

## 2020-08-27 ENCOUNTER — APPOINTMENT (OUTPATIENT)
Dept: UROLOGY | Facility: HOSPITAL | Age: 30
End: 2020-08-27

## 2020-08-27 ENCOUNTER — OUTPATIENT (OUTPATIENT)
Dept: OUTPATIENT SERVICES | Facility: HOSPITAL | Age: 30
LOS: 1 days | End: 2020-08-27
Payer: MEDICAID

## 2020-08-27 ENCOUNTER — RESULT REVIEW (OUTPATIENT)
Age: 30
End: 2020-08-27

## 2020-08-27 VITALS
SYSTOLIC BLOOD PRESSURE: 111 MMHG | WEIGHT: 147.93 LBS | TEMPERATURE: 98 F | RESPIRATION RATE: 16 BRPM | DIASTOLIC BLOOD PRESSURE: 73 MMHG | HEIGHT: 61.02 IN | OXYGEN SATURATION: 100 % | HEART RATE: 60 BPM

## 2020-08-27 VITALS
DIASTOLIC BLOOD PRESSURE: 66 MMHG | RESPIRATION RATE: 18 BRPM | OXYGEN SATURATION: 99 % | HEART RATE: 73 BPM | SYSTOLIC BLOOD PRESSURE: 120 MMHG

## 2020-08-27 DIAGNOSIS — N20.1 CALCULUS OF URETER: ICD-10-CM

## 2020-08-27 DIAGNOSIS — Z01.818 ENCOUNTER FOR OTHER PREPROCEDURAL EXAMINATION: ICD-10-CM

## 2020-08-27 DIAGNOSIS — Z98.891 HISTORY OF UTERINE SCAR FROM PREVIOUS SURGERY: Chronic | ICD-10-CM

## 2020-08-27 DIAGNOSIS — N20.0 CALCULUS OF KIDNEY: Chronic | ICD-10-CM

## 2020-08-27 DIAGNOSIS — Z90.49 ACQUIRED ABSENCE OF OTHER SPECIFIED PARTS OF DIGESTIVE TRACT: Chronic | ICD-10-CM

## 2020-08-27 LAB
CULTURE RESULTS: SIGNIFICANT CHANGE UP
HCG UR QL: NEGATIVE — SIGNIFICANT CHANGE UP
SPECIMEN SOURCE: SIGNIFICANT CHANGE UP

## 2020-08-27 PROCEDURE — C1758: CPT

## 2020-08-27 PROCEDURE — 81025 URINE PREGNANCY TEST: CPT

## 2020-08-27 PROCEDURE — C1889: CPT

## 2020-08-27 PROCEDURE — 82365 CALCULUS SPECTROSCOPY: CPT

## 2020-08-27 PROCEDURE — 52356 CYSTO/URETERO W/LITHOTRIPSY: CPT | Mod: LT

## 2020-08-27 PROCEDURE — 88300 SURGICAL PATH GROSS: CPT

## 2020-08-27 PROCEDURE — C1769: CPT

## 2020-08-27 PROCEDURE — 52356 CYSTO/URETERO W/LITHOTRIPSY: CPT

## 2020-08-27 PROCEDURE — 76000 FLUOROSCOPY <1 HR PHYS/QHP: CPT

## 2020-08-27 PROCEDURE — 88300 SURGICAL PATH GROSS: CPT | Mod: 26

## 2020-08-27 RX ORDER — CEPHALEXIN 500 MG
1 CAPSULE ORAL
Qty: 6 | Refills: 0
Start: 2020-08-27 | End: 2020-08-29

## 2020-08-27 RX ORDER — HYDROMORPHONE HYDROCHLORIDE 2 MG/ML
0.5 INJECTION INTRAMUSCULAR; INTRAVENOUS; SUBCUTANEOUS
Refills: 0 | Status: DISCONTINUED | OUTPATIENT
Start: 2020-08-27 | End: 2020-08-27

## 2020-08-27 RX ORDER — OXYCODONE AND ACETAMINOPHEN 5; 325 MG/1; MG/1
1 TABLET ORAL ONCE
Refills: 0 | Status: DISCONTINUED | OUTPATIENT
Start: 2020-08-27 | End: 2020-08-27

## 2020-08-27 RX ORDER — LIDOCAINE HCL 20 MG/ML
0.2 VIAL (ML) INJECTION ONCE
Refills: 0 | Status: DISCONTINUED | OUTPATIENT
Start: 2020-08-27 | End: 2020-08-27

## 2020-08-27 RX ORDER — SODIUM CHLORIDE 9 MG/ML
3 INJECTION INTRAMUSCULAR; INTRAVENOUS; SUBCUTANEOUS EVERY 8 HOURS
Refills: 0 | Status: DISCONTINUED | OUTPATIENT
Start: 2020-08-27 | End: 2020-08-27

## 2020-08-27 RX ADMIN — OXYCODONE AND ACETAMINOPHEN 1 TABLET(S): 5; 325 TABLET ORAL at 09:45

## 2020-08-27 RX ADMIN — OXYCODONE AND ACETAMINOPHEN 1 TABLET(S): 5; 325 TABLET ORAL at 10:09

## 2020-08-27 NOTE — ASU DISCHARGE PLAN (ADULT/PEDIATRIC) - NURSING INSTRUCTIONS
pt reactive to anesthesia, pt has met discharge criteria, discharge instructions and verbalized understanding. iv d/c'd, transport at .

## 2020-08-27 NOTE — ASU DISCHARGE PLAN (ADULT/PEDIATRIC) - CARE PROVIDER_API CALL
Omkar Jones  UROLOGY  [address] [place] [address], [place] [address]  Phone: [phone]  Fax: [phone]  Follow Up Time: 1 week Omkar Jones  UROLOGY  [address] [place] [address], [place] [address]  Phone: [phone]  Fax: [phone]  Follow Up Time: 1 month

## 2020-08-27 NOTE — ASU DISCHARGE PLAN (ADULT/PEDIATRIC) - PROVIDER TOKENS
PROVIDER:[TOKEN:[[id number]],FOLLOWUP:[1 week]] PROVIDER:[TOKEN:[[id number]],FOLLOWUP:[1 month]]

## 2020-08-27 NOTE — ASU DISCHARGE PLAN (ADULT/PEDIATRIC) - ASU DC SPECIAL INSTRUCTIONSFT
Take antibiotic for 3 days twice a day.   F/u with Dr. Jones 1 week  Take over the counter pain medication for pain control Take antibiotic for 3 days twice a day.   F/u with Dr. Jones 1 month  Take over the counter pain medication for pain control Take antibiotic for 3 days twice a day.   F/u with Dr. Jones 1 month  Take over the counter pain medication for pain control  Tylenol or motrin every 6 hours as needed

## 2020-08-27 NOTE — BRIEF OPERATIVE NOTE - NSICDXBRIEFPROCEDURE_GEN_ALL_CORE_FT
PROCEDURES:  Cystoscopy with left ureteroscopy and left-sided lithotripsy procedure 27-Aug-2020 08:50:16  Yuniel Ludwig

## 2020-08-27 NOTE — BRIEF OPERATIVE NOTE - OPERATION/FINDINGS
Left ureteral stent removal. cystoscopy, semi-rigid and flexible ureteroscopy, lithotripsy, stone extraction

## 2020-08-28 ENCOUNTER — APPOINTMENT (OUTPATIENT)
Dept: OBGYN | Facility: HOSPITAL | Age: 30
End: 2020-08-28

## 2020-08-28 NOTE — ASSESSMENT
[FreeTextEntry1] : 28 yo postpartum s/p C/S 6/22/20 presents for colposcopy due to +HPV high risk, ASCUS Pap (5/2020). Acetowhite changes at 7 o'clock, biopsied. ECC collected.

## 2020-08-28 NOTE — PROCEDURE
[Colposcopy] : colposcopy [ASCUS] : atypical squamous cells of undetermined significance [HPV high risk] : PCR positive for high risk HPV [Risks] : risks [Patient] : patient [Consent Obtained] : written consent was obtained prior to the procedure [Alternatives] : alternatives [Benefits] : benefits [Acetowhite ___ o'clock] : ascetowhite changes at [unfilled] ~Uo'clock [No Abnormalities] : no abnormalities seen [Biopsies Taken: # ___] : [unfilled] biopsies taken of the cervix [Biopsy Locations ___ o'clock] : the biopsies were taken at [unfilled] o'clock [Ariane's] : Ariane's solution [Tolerated Well] : the patient tolerated the procedure well [ECC Done] : Endocervical curettage was performed.  [Bleeding] : excesive bleeding was noted [Pap Performed] : a cervical Pap smear was not performed [Punctation ___ o'clock] : no punctation [SCJ Fully Visulized] : the squamocolumnar junction was not fully visualized [Mosaicism ___ o'clock] : no mosaicism [Atypical Vessels ___ o'clock] : no atypical vessels

## 2020-09-03 DIAGNOSIS — Z23 ENCOUNTER FOR IMMUNIZATION: ICD-10-CM

## 2020-09-03 LAB — NIDUS STONE QN: SIGNIFICANT CHANGE UP

## 2020-09-10 LAB — SURGICAL PATHOLOGY STUDY: SIGNIFICANT CHANGE UP

## 2020-09-14 ENCOUNTER — APPOINTMENT (OUTPATIENT)
Age: 30
End: 2020-09-14

## 2020-09-18 NOTE — PROGRESS NOTE ADULT - PROVIDER SPECIALTY LIST ADULT
Hermelinda  Your mammogram was normal! Next is due in one year.   Please let me know immediately if you notice any new lumps/rashes/pain/nipple discharge.  Maribel Long M.D.   OB

## 2021-01-10 NOTE — PROGRESS NOTE ADULT - PROBLEM SELECTOR PROBLEM 1
Pyelonephritis
R/O Pyelonephritis
Pyelonephritis
Normal rate, regular rhythm.  Heart sounds S1, S2.  No murmurs, rubs or gallops.

## 2021-01-21 NOTE — OB RN PATIENT PROFILE - FALL HARM RISK CONCLUSION
Verified name and . Patient calling to follow up on refill request for Omeprazole. He states that he is out of the medication.     Medication pended for your review and approval. Universal Safety Interventions

## 2021-02-11 ENCOUNTER — RESULT REVIEW (OUTPATIENT)
Age: 31
End: 2021-02-11

## 2021-02-11 ENCOUNTER — OUTPATIENT (OUTPATIENT)
Dept: OUTPATIENT SERVICES | Facility: HOSPITAL | Age: 31
LOS: 1 days | End: 2021-02-11
Payer: MEDICAID

## 2021-02-11 ENCOUNTER — APPOINTMENT (OUTPATIENT)
Dept: OBGYN | Facility: HOSPITAL | Age: 31
End: 2021-02-11
Payer: MEDICAID

## 2021-02-11 VITALS
TEMPERATURE: 97.7 F | HEIGHT: 63 IN | SYSTOLIC BLOOD PRESSURE: 110 MMHG | DIASTOLIC BLOOD PRESSURE: 70 MMHG | BODY MASS INDEX: 29.95 KG/M2 | WEIGHT: 169 LBS | HEART RATE: 76 BPM

## 2021-02-11 DIAGNOSIS — Z90.49 ACQUIRED ABSENCE OF OTHER SPECIFIED PARTS OF DIGESTIVE TRACT: Chronic | ICD-10-CM

## 2021-02-11 DIAGNOSIS — N20.0 CALCULUS OF KIDNEY: Chronic | ICD-10-CM

## 2021-02-11 DIAGNOSIS — Z98.891 HISTORY OF UTERINE SCAR FROM PREVIOUS SURGERY: Chronic | ICD-10-CM

## 2021-02-11 LAB
HCG UR QL: NEGATIVE
QUALITY CONTROL: YES

## 2021-02-11 PROCEDURE — 57454 BX/CURETT OF CERVIX W/SCOPE: CPT | Mod: GC

## 2021-02-11 PROCEDURE — 88305 TISSUE EXAM BY PATHOLOGIST: CPT | Mod: 26

## 2021-02-17 LAB — SURGICAL PATHOLOGY STUDY: SIGNIFICANT CHANGE UP

## 2021-02-23 ENCOUNTER — OUTPATIENT (OUTPATIENT)
Dept: OUTPATIENT SERVICES | Facility: HOSPITAL | Age: 31
LOS: 1 days | End: 2021-02-23

## 2021-02-23 ENCOUNTER — RESULT CHARGE (OUTPATIENT)
Age: 31
End: 2021-02-23

## 2021-02-23 ENCOUNTER — APPOINTMENT (OUTPATIENT)
Dept: OBGYN | Facility: HOSPITAL | Age: 31
End: 2021-02-23

## 2021-02-23 VITALS
TEMPERATURE: 97.1 F | DIASTOLIC BLOOD PRESSURE: 82 MMHG | SYSTOLIC BLOOD PRESSURE: 120 MMHG | HEART RATE: 66 BPM | WEIGHT: 169 LBS | BODY MASS INDEX: 29.95 KG/M2 | HEIGHT: 63 IN

## 2021-02-23 DIAGNOSIS — Z90.49 ACQUIRED ABSENCE OF OTHER SPECIFIED PARTS OF DIGESTIVE TRACT: Chronic | ICD-10-CM

## 2021-02-23 DIAGNOSIS — N20.0 CALCULUS OF KIDNEY: Chronic | ICD-10-CM

## 2021-02-23 DIAGNOSIS — Z98.891 HISTORY OF UTERINE SCAR FROM PREVIOUS SURGERY: Chronic | ICD-10-CM

## 2021-02-23 DIAGNOSIS — R87.610 ATYPICAL SQUAMOUS CELLS OF UNDETERMINED SIGNIFICANCE ON CYTOLOGIC SMEAR OF CERVIX (ASC-US): ICD-10-CM

## 2021-02-23 DIAGNOSIS — N87.0 MILD CERVICAL DYSPLASIA: ICD-10-CM

## 2021-02-23 LAB
HCG UR QL: NEGATIVE
QUALITY CONTROL: YES

## 2021-02-23 RX ORDER — LEVONORGESTREL AND ETHINYL ESTRADIOL 0.1-0.02MG
0.1-2 KIT ORAL DAILY
Qty: 1 | Refills: 5 | Status: ACTIVE | COMMUNITY
Start: 2021-02-23 | End: 1900-01-01

## 2021-02-23 NOTE — HISTORY OF PRESENT ILLNESS
[FreeTextEntry1] : 31 yo  LMP 22 presents for birth control counseling and to discuss a lump she feels beneath her L arm. Initially noticed a small lump in the L breast beneath her axilla. States she normally has bilateral breast pain before a period so thought initially it might just be that- is not tender today. She denies any skin changes, nipple dc. Not breastfeeding. Denies any family hx of breast cancer. \par She also wishes to discuss her contraceptive options today. She had an appointment to have the nexplanon inserted but has since reconsidered. She thinks she may want to start birth control pills. She has never taken any other form of birth control. \par Of note pt had repeat colposcopy  for a hx if ZAHIDA-1 on colpo in . Results were again ZAHIDA-1.

## 2021-02-23 NOTE — COUNSELING
[Nutrition/ Exercise/ Weight Management] : nutrition, exercise, weight management [STD (testing, results, tx)] : STD (testing, results, tx) [Lab Results] : lab results [Medication Management] : medication management

## 2021-02-23 NOTE — DISCUSSION/SUMMARY
[FreeTextEntry1] : 29 yo  LMP 21 presents for breast lump and birth control counseling. \par \par 1. Breast lump\par -likely fibrocystic changes however will obtain sono given sudden onset of pain\par -consider breast clinic referral\par \par 2. Birth control\par -discussed options for birth control including nexplanon vs. OCPs, pt wishes to start OCPs at this time\par -UCG negative today\par -Lessina  sent to pharmacy x 6 mo\par -reviewed ACHES\par \par 3. ZAHIDA-1\par -reviewed pathology from recent colpo showing ZAHIDA-1 at the 9 o clock location. Pt instructed to make appt for 1 year for followup pap. \par \par RTC 6 mo or prn

## 2021-02-23 NOTE — PHYSICAL EXAM
[Appropriately responsive] : appropriately responsive [Alert] : alert [No Acute Distress] : no acute distress [No Lymphadenopathy] : no lymphadenopathy [No Murmurs] : no murmurs [Regular Rate Rhythm] : regular rate rhythm [Clear to Auscultation B/L] : clear to auscultation bilaterally [Soft] : soft [Non-tender] : non-tender [Non-distended] : non-distended [No HSM] : No HSM [No Lesions] : no lesions [No Mass] : no mass [Oriented x3] : oriented x3 [Normal] : normal [Examination Of The Breasts] : a normal appearance [No Masses] : no breast masses were palpable

## 2021-02-24 DIAGNOSIS — Z30.09 ENCOUNTER FOR OTHER GENERAL COUNSELING AND ADVICE ON CONTRACEPTION: ICD-10-CM

## 2021-02-24 DIAGNOSIS — N64.4 MASTODYNIA: ICD-10-CM

## 2021-02-28 NOTE — PROCEDURE
[Colposcopy] : Colposcopy  [Time out performed] : Pre-procedure time out performed.  Patient's name, date of birth and procedure confirmed. [Consent Obtained] : Consent obtained [Risks] : risks [Benefits] : benefits [Alternatives] : alternatives [Patient] : patient [Infection] : infection [Bleeding] : bleeding [Allergic Reaction] : allergic reaction [ASCUS] : ASCUS [HPV High Risk] : HPV high risk [No Premedication] : no premedication [Hemostasis Obtained] : Hemostasis obtained [Tolerated Well] : the patient tolerated the procedure well [ECC Performed] : ECC performed [No Abnormalities] : no abnormalities [Lesion] : lesion seen [Biopsy] : biopsy taken [Pap Performed] : pap not performed [SCI Fully Visualized] : SCI not fully visualized [de-identified] : pap(5/2020): ASCUS, HPVHR+ -> colpo(8/2020): 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 [de-identified] : 3 [de-identified] : Acetowhite changes noted circumferentially from 8-11:00 and at 2:00. 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 Today biopsies taken at 9, 10, and 2 o'clock along with ECC. \par -acetowhite changes w/ mosaicism noted therefore biopsies x3 taken with ECC\par -Will contact patient with results and recommended follow up.\par \par Pt seen and d/w Dr. Pacheco\par KBeetham PGY4

## 2021-05-18 ENCOUNTER — APPOINTMENT (OUTPATIENT)
Dept: OBGYN | Facility: HOSPITAL | Age: 31
End: 2021-05-18

## 2021-10-14 NOTE — OB PROVIDER TRIAGE NOTE - NS_PHYSICALALLNEG_OBGYN_ALL_OB
GDM diet controlled  anxiety post partum no meds  hypothyroid on synthroid  C/S X2
All other review of systems negative, except as noted in HPI

## 2021-12-15 ENCOUNTER — APPOINTMENT (OUTPATIENT)
Dept: OBGYN | Facility: HOSPITAL | Age: 31
End: 2021-12-15

## 2022-01-27 ENCOUNTER — RESULT REVIEW (OUTPATIENT)
Age: 32
End: 2022-01-27

## 2022-01-27 ENCOUNTER — OUTPATIENT (OUTPATIENT)
Dept: OUTPATIENT SERVICES | Facility: HOSPITAL | Age: 32
LOS: 1 days | End: 2022-01-27

## 2022-01-27 ENCOUNTER — APPOINTMENT (OUTPATIENT)
Dept: OBGYN | Facility: HOSPITAL | Age: 32
End: 2022-01-27
Payer: MEDICAID

## 2022-01-27 ENCOUNTER — TRANSCRIPTION ENCOUNTER (OUTPATIENT)
Age: 32
End: 2022-01-27

## 2022-01-27 VITALS
DIASTOLIC BLOOD PRESSURE: 72 MMHG | SYSTOLIC BLOOD PRESSURE: 115 MMHG | WEIGHT: 174 LBS | HEART RATE: 68 BPM | HEIGHT: 63 IN | BODY MASS INDEX: 30.83 KG/M2 | TEMPERATURE: 96.9 F

## 2022-01-27 DIAGNOSIS — N64.4 MASTODYNIA: ICD-10-CM

## 2022-01-27 DIAGNOSIS — N92.6 IRREGULAR MENSTRUATION, UNSPECIFIED: ICD-10-CM

## 2022-01-27 DIAGNOSIS — N87.0 MILD CERVICAL DYSPLASIA: ICD-10-CM

## 2022-01-27 DIAGNOSIS — Z90.49 ACQUIRED ABSENCE OF OTHER SPECIFIED PARTS OF DIGESTIVE TRACT: Chronic | ICD-10-CM

## 2022-01-27 DIAGNOSIS — N20.0 CALCULUS OF KIDNEY: Chronic | ICD-10-CM

## 2022-01-27 DIAGNOSIS — Z98.891 HISTORY OF UTERINE SCAR FROM PREVIOUS SURGERY: Chronic | ICD-10-CM

## 2022-01-27 DIAGNOSIS — Z30.09 ENCOUNTER FOR OTHER GENERAL COUNSELING AND ADVICE ON CONTRACEPTION: ICD-10-CM

## 2022-01-27 LAB
HCG SERPL-ACNC: <5 MIU/ML — SIGNIFICANT CHANGE UP
HIV 1+2 AB+HIV1 P24 AG SERPL QL IA: SIGNIFICANT CHANGE UP
T4 FREE SERPL-MCNC: 1.1 NG/DL — SIGNIFICANT CHANGE UP (ref 0.9–1.8)
TSH SERPL-MCNC: 1.06 UIU/ML — SIGNIFICANT CHANGE UP (ref 0.27–4.2)

## 2022-01-27 PROCEDURE — 99213 OFFICE O/P EST LOW 20 MIN: CPT | Mod: GC

## 2022-01-27 NOTE — COUNSELING
[Nutrition/ Exercise/ Weight Management] : nutrition, exercise, weight management [Contraception/ Emergency Contraception/ Safe Sexual Practices] : contraception, emergency contraception, safe sexual practices [STD (testing, results, tx)] : STD (testing, results, tx) [Vaccines] : vaccines

## 2022-01-27 NOTE — PHYSICAL EXAM
[Appropriately responsive] : appropriately responsive [Alert] : alert [No Acute Distress] : no acute distress [Regular Rate Rhythm] : regular rate rhythm [Clear to Auscultation B/L] : clear to auscultation bilaterally [Soft] : soft [Non-tender] : non-tender [Non-distended] : non-distended [Oriented x3] : oriented x3 [Examination Of The Breasts] : a normal appearance [___cm] : a ~M [unfilled] ~Ucm superior lateral quadrant mass was palpated [Labia Majora] : normal [Labia Minora] : normal [Normal] : normal [Uterine Adnexae] : normal

## 2022-01-28 DIAGNOSIS — Z30.09 ENCOUNTER FOR OTHER GENERAL COUNSELING AND ADVICE ON CONTRACEPTION: ICD-10-CM

## 2022-01-28 DIAGNOSIS — Z00.00 ENCOUNTER FOR GENERAL ADULT MEDICAL EXAMINATION WITHOUT ABNORMAL FINDINGS: ICD-10-CM

## 2022-01-28 DIAGNOSIS — N64.4 MASTODYNIA: ICD-10-CM

## 2022-01-28 DIAGNOSIS — N87.0 MILD CERVICAL DYSPLASIA: ICD-10-CM

## 2022-01-28 DIAGNOSIS — N92.6 IRREGULAR MENSTRUATION, UNSPECIFIED: ICD-10-CM

## 2022-01-28 LAB
C TRACH RRNA SPEC QL NAA+PROBE: SIGNIFICANT CHANGE UP
HBV SURFACE AG SER-ACNC: SIGNIFICANT CHANGE UP
N GONORRHOEA RRNA SPEC QL NAA+PROBE: SIGNIFICANT CHANGE UP
SPECIMEN SOURCE: SIGNIFICANT CHANGE UP
T PALLIDUM AB TITR SER: NEGATIVE — SIGNIFICANT CHANGE UP
TESTOST FREE+TOTAL PANEL SERPL-MCNC: 12.3 NG/DL — SIGNIFICANT CHANGE UP (ref 8.4–48.1)

## 2022-01-29 LAB — HPV HIGH+LOW RISK DNA PNL CVX: SIGNIFICANT CHANGE UP

## 2022-01-31 RX ORDER — MEDROXYPROGESTERONE ACETATE 10 MG/1
10 TABLET ORAL DAILY
Qty: 10 | Refills: 0 | Status: ACTIVE | COMMUNITY
Start: 2022-01-31 | End: 1900-01-01

## 2022-02-01 LAB — TESTOST FREE SERPL-MCNC: 1.3 PG/ML — SIGNIFICANT CHANGE UP (ref 0.1–6.3)

## 2022-02-02 LAB — CYTOLOGY SPEC DOC CYTO: SIGNIFICANT CHANGE UP

## 2022-02-13 NOTE — REVIEW OF SYSTEMS
[Fever] : no fever [Chills] : no chills [Cough] : no cough [SOB on Exertion] : no shortness of breath on exertion [Chest Pain] : no chest pain [Palpitations] : no palpitations [Vomiting] : no vomiting [Nausea] : no nausea [Urgency] : no urgency [Frequency] : no frequency

## 2022-02-13 NOTE — HISTORY OF PRESENT ILLNESS
[FreeTextEntry1] : Guatemalan Interpretor: [id number]\par \par 30 yo  LMP 22 presenting for annual exam. \par Pt feels well overall. Denies vaginal bleeding, discharge, bowel/bladder issues. \par Pt endorsing small nodule on lateral left breast. Was noted on exam in 2021 and breast US was ordered, which pt did not get. States it has not changed in size, non painful, hard, mobile. \par \par Pap: hx ASUS HRHPV pap in  -> colpo ZAHIDA I () -> colpo ZAHIDA I (2021)\par COVID: vaccinated x3 \par \par OB hx: \par  pLTCS  cat II\par \par GYN: \par hx of irregular menses q1-2 months, told she has PCOS previously \par Birth control: not currently using any method regularly, condoms irregularly \par Denies hx of STI\par \par PMH: Kidney stones \par \par Surg: denies \par \par Med; none\par \par All: NKDA\par \par Soc: denie JAYA\par \par Fam: denies hx of GYN cancers, colon cancer, coagulopathy

## 2022-02-13 NOTE — PLAN
[FreeTextEntry1] : 32 yo  LMP 21 presenting for annual visit. \par \par #Hx abnormal colpo\par - ZAHIDA I (, )\par - repeat Pap+HPV today, f/u result \par \par #L breast nodule \par - stable in size, nonpainful\par - breast US ordered, f/u result \par \par #Irregular Menses\par - TSH, free T4\par - Free and total Testosterone \par - DHEA\par - TVUS ordered \par \par #Birth control\par - Pt desires IUD for contraception \par - bHCG serum sent today \par - Once results of bHCG -> prescribe course of provera to cause shedding of lining (given irregular menses) -> call to schedule IUD placement \par \par #HCM\par - STI testing: HIV, syphilis, Hepatitis B, GC/CT \par - f/u Pap \par - Counseled on vaccines, diet/nutrition, STI prevention, contraception \par \par Arleen Cannon, PGY1\par Pt seen and discussed with Dr. Vale\par Interpretor disconnected prior to Dr. Vale entering room. Dr. Kothari PGY4 provided Maldivian interpretation for Dr. Vale

## 2022-02-17 ENCOUNTER — NON-APPOINTMENT (OUTPATIENT)
Age: 32
End: 2022-02-17

## 2022-03-30 ENCOUNTER — APPOINTMENT (OUTPATIENT)
Dept: ULTRASOUND IMAGING | Facility: IMAGING CENTER | Age: 32
End: 2022-03-30

## 2022-03-30 ENCOUNTER — APPOINTMENT (OUTPATIENT)
Dept: MAMMOGRAPHY | Facility: IMAGING CENTER | Age: 32
End: 2022-03-30

## 2022-09-24 NOTE — OB PROVIDER TRIAGE NOTE - NS_FHRDECEL_OBGYN_ALL_OB
BIBA s/p fall with head injury. Intoxicated. Pt was found on ground by the door in bathroom. Laceration noted on lip.  Code trauma proactivated at 2316. Pt awake alert and oriented x1 upon arrival. GCS 13.
No Decelerations

## 2022-11-17 NOTE — PATIENT PROFILE ADULT - NSPROGENOTHERPROVIDER_GEN_A_NUR
none Hydroxyzine Counseling: Patient advised that the medication is sedating and not to drive a car after taking this medication.  Patient informed of potential adverse effects including but not limited to dry mouth, urinary retention, and blurry vision.  The patient verbalized understanding of the proper use and possible adverse effects of hydroxyzine.  All of the patient's questions and concerns were addressed.

## 2022-12-06 ENCOUNTER — OUTPATIENT (OUTPATIENT)
Dept: OUTPATIENT SERVICES | Facility: HOSPITAL | Age: 32
LOS: 1 days | End: 2022-12-06
Payer: MEDICAID

## 2022-12-06 ENCOUNTER — RESULT REVIEW (OUTPATIENT)
Age: 32
End: 2022-12-06

## 2022-12-06 ENCOUNTER — APPOINTMENT (OUTPATIENT)
Dept: ULTRASOUND IMAGING | Facility: IMAGING CENTER | Age: 32
End: 2022-12-06

## 2022-12-06 DIAGNOSIS — Z98.891 HISTORY OF UTERINE SCAR FROM PREVIOUS SURGERY: Chronic | ICD-10-CM

## 2022-12-06 DIAGNOSIS — Z90.49 ACQUIRED ABSENCE OF OTHER SPECIFIED PARTS OF DIGESTIVE TRACT: Chronic | ICD-10-CM

## 2022-12-06 DIAGNOSIS — N20.0 CALCULUS OF KIDNEY: Chronic | ICD-10-CM

## 2022-12-06 DIAGNOSIS — N92.6 IRREGULAR MENSTRUATION, UNSPECIFIED: ICD-10-CM

## 2022-12-06 PROCEDURE — 76641 ULTRASOUND BREAST COMPLETE: CPT | Mod: 26,50

## 2022-12-06 PROCEDURE — 76641 ULTRASOUND BREAST COMPLETE: CPT

## 2022-12-21 ENCOUNTER — RESULT CHARGE (OUTPATIENT)
Age: 32
End: 2022-12-21

## 2022-12-21 ENCOUNTER — OUTPATIENT (OUTPATIENT)
Dept: OUTPATIENT SERVICES | Facility: HOSPITAL | Age: 32
LOS: 1 days | End: 2022-12-21

## 2022-12-21 ENCOUNTER — APPOINTMENT (OUTPATIENT)
Dept: OBGYN | Facility: HOSPITAL | Age: 32
End: 2022-12-21

## 2022-12-21 DIAGNOSIS — N20.0 CALCULUS OF KIDNEY: Chronic | ICD-10-CM

## 2022-12-21 DIAGNOSIS — Z98.891 HISTORY OF UTERINE SCAR FROM PREVIOUS SURGERY: Chronic | ICD-10-CM

## 2022-12-21 DIAGNOSIS — Z90.49 ACQUIRED ABSENCE OF OTHER SPECIFIED PARTS OF DIGESTIVE TRACT: Chronic | ICD-10-CM

## 2022-12-22 DIAGNOSIS — Z32.00 ENCOUNTER FOR PREGNANCY TEST, RESULT UNKNOWN: ICD-10-CM

## 2023-01-10 ENCOUNTER — RESULT REVIEW (OUTPATIENT)
Age: 33
End: 2023-01-10

## 2023-01-10 ENCOUNTER — APPOINTMENT (OUTPATIENT)
Dept: OBGYN | Facility: HOSPITAL | Age: 33
End: 2023-01-10

## 2023-01-10 ENCOUNTER — OUTPATIENT (OUTPATIENT)
Dept: OUTPATIENT SERVICES | Facility: HOSPITAL | Age: 33
LOS: 1 days | End: 2023-01-10
Payer: MEDICAID

## 2023-01-10 VITALS
WEIGHT: 163 LBS | SYSTOLIC BLOOD PRESSURE: 125 MMHG | HEART RATE: 63 BPM | HEIGHT: 60 IN | BODY MASS INDEX: 32 KG/M2 | DIASTOLIC BLOOD PRESSURE: 86 MMHG | TEMPERATURE: 97.8 F

## 2023-01-10 DIAGNOSIS — Z34.92 ENCOUNTER FOR SUPERVISION OF NORMAL PREGNANCY, UNSPECIFIED, SECOND TRIMESTER: ICD-10-CM

## 2023-01-10 DIAGNOSIS — Z34.90 ENCOUNTER FOR SUPERVISION OF NORMAL PREGNANCY, UNSPECIFIED, UNSPECIFIED TRIMESTER: ICD-10-CM

## 2023-01-10 LAB
BASOPHILS # BLD AUTO: 0.05 K/UL — SIGNIFICANT CHANGE UP (ref 0–0.2)
BASOPHILS NFR BLD AUTO: 0.5 % — SIGNIFICANT CHANGE UP (ref 0–2)
EOSINOPHIL # BLD AUTO: 0.26 K/UL — SIGNIFICANT CHANGE UP (ref 0–0.5)
EOSINOPHIL NFR BLD AUTO: 2.5 % — SIGNIFICANT CHANGE UP (ref 0–6)
HCG SERPL-ACNC: SIGNIFICANT CHANGE UP MIU/ML
HCT VFR BLD CALC: 38.7 % — SIGNIFICANT CHANGE UP (ref 34.5–45)
HGB BLD-MCNC: 12.7 G/DL — SIGNIFICANT CHANGE UP (ref 11.5–15.5)
IANC: 6.75 K/UL — SIGNIFICANT CHANGE UP (ref 1.8–7.4)
IMM GRANULOCYTES NFR BLD AUTO: 0.2 % — SIGNIFICANT CHANGE UP (ref 0–0.9)
LYMPHOCYTES # BLD AUTO: 29.1 % — SIGNIFICANT CHANGE UP (ref 13–44)
LYMPHOCYTES # BLD AUTO: 3.07 K/UL — SIGNIFICANT CHANGE UP (ref 1–3.3)
MCHC RBC-ENTMCNC: 29.5 PG — SIGNIFICANT CHANGE UP (ref 27–34)
MCHC RBC-ENTMCNC: 32.8 GM/DL — SIGNIFICANT CHANGE UP (ref 32–36)
MCV RBC AUTO: 90 FL — SIGNIFICANT CHANGE UP (ref 80–100)
MONOCYTES # BLD AUTO: 0.41 K/UL — SIGNIFICANT CHANGE UP (ref 0–0.9)
MONOCYTES NFR BLD AUTO: 3.9 % — SIGNIFICANT CHANGE UP (ref 2–14)
NEUTROPHILS # BLD AUTO: 6.75 K/UL — SIGNIFICANT CHANGE UP (ref 1.8–7.4)
NEUTROPHILS NFR BLD AUTO: 63.8 % — SIGNIFICANT CHANGE UP (ref 43–77)
NRBC # BLD: 0 /100 WBCS — SIGNIFICANT CHANGE UP (ref 0–0)
NRBC # FLD: 0 K/UL — SIGNIFICANT CHANGE UP (ref 0–0)
PLATELET # BLD AUTO: 265 K/UL — SIGNIFICANT CHANGE UP (ref 150–400)
RBC # BLD: 4.3 M/UL — SIGNIFICANT CHANGE UP (ref 3.8–5.2)
RBC # FLD: 12.8 % — SIGNIFICANT CHANGE UP (ref 10.3–14.5)
WBC # BLD: 10.56 K/UL — HIGH (ref 3.8–10.5)
WBC # FLD AUTO: 10.56 K/UL — HIGH (ref 3.8–10.5)

## 2023-01-10 PROCEDURE — 76801 OB US < 14 WKS SINGLE FETUS: CPT | Mod: 26

## 2023-01-10 PROCEDURE — 76815 OB US LIMITED FETUS(S): CPT | Mod: 26

## 2023-01-11 ENCOUNTER — NON-APPOINTMENT (OUTPATIENT)
Age: 33
End: 2023-01-11

## 2023-01-11 PROBLEM — Z34.92 ENCOUNTER FOR SUPERVISION OF NORMAL PREGNANCY IN SECOND TRIMESTER, UNSPECIFIED GRAVIDITY: Status: ACTIVE | Noted: 2023-01-10

## 2023-01-11 LAB
C TRACH RRNA SPEC QL NAA+PROBE: SIGNIFICANT CHANGE UP
N GONORRHOEA RRNA SPEC QL NAA+PROBE: SIGNIFICANT CHANGE UP
SPECIMEN SOURCE: SIGNIFICANT CHANGE UP

## 2023-01-12 ENCOUNTER — APPOINTMENT (OUTPATIENT)
Dept: OBGYN | Facility: HOSPITAL | Age: 33
End: 2023-01-12

## 2023-01-12 ENCOUNTER — NON-APPOINTMENT (OUTPATIENT)
Age: 33
End: 2023-01-12

## 2023-01-12 DIAGNOSIS — Z34.92 ENCOUNTER FOR SUPERVISION OF NORMAL PREGNANCY, UNSPECIFIED, SECOND TRIMESTER: ICD-10-CM

## 2023-01-12 DIAGNOSIS — Z34.90 ENCOUNTER FOR SUPERVISION OF NORMAL PREGNANCY, UNSPECIFIED, UNSPECIFIED TRIMESTER: ICD-10-CM

## 2023-01-18 ENCOUNTER — APPOINTMENT (OUTPATIENT)
Dept: OBGYN | Facility: HOSPITAL | Age: 33
End: 2023-01-18

## 2023-01-23 ENCOUNTER — NON-APPOINTMENT (OUTPATIENT)
Age: 33
End: 2023-01-23

## 2023-01-23 ENCOUNTER — APPOINTMENT (OUTPATIENT)
Dept: ULTRASOUND IMAGING | Facility: HOSPITAL | Age: 33
End: 2023-01-23

## 2023-01-23 ENCOUNTER — RESULT REVIEW (OUTPATIENT)
Age: 33
End: 2023-01-23

## 2023-01-23 ENCOUNTER — APPOINTMENT (OUTPATIENT)
Dept: OBGYN | Facility: HOSPITAL | Age: 33
End: 2023-01-23
Payer: MEDICAID

## 2023-01-23 ENCOUNTER — OUTPATIENT (OUTPATIENT)
Dept: OUTPATIENT SERVICES | Facility: HOSPITAL | Age: 33
LOS: 1 days | End: 2023-01-23
Payer: MEDICAID

## 2023-01-23 VITALS
BODY MASS INDEX: 32.39 KG/M2 | SYSTOLIC BLOOD PRESSURE: 126 MMHG | WEIGHT: 165 LBS | HEIGHT: 60 IN | HEART RATE: 76 BPM | TEMPERATURE: 98 F | DIASTOLIC BLOOD PRESSURE: 77 MMHG

## 2023-01-23 DIAGNOSIS — Z98.891 HISTORY OF UTERINE SCAR FROM PREVIOUS SURGERY: Chronic | ICD-10-CM

## 2023-01-23 DIAGNOSIS — N20.0 CALCULUS OF KIDNEY: Chronic | ICD-10-CM

## 2023-01-23 DIAGNOSIS — Z90.49 ACQUIRED ABSENCE OF OTHER SPECIFIED PARTS OF DIGESTIVE TRACT: Chronic | ICD-10-CM

## 2023-01-23 PROCEDURE — 99213 OFFICE O/P EST LOW 20 MIN: CPT | Mod: TH,GE

## 2023-01-23 PROCEDURE — 76817 TRANSVAGINAL US OBSTETRIC: CPT | Mod: 26

## 2023-01-24 DIAGNOSIS — O02.1 MISSED ABORTION: ICD-10-CM

## 2023-01-25 ENCOUNTER — NON-APPOINTMENT (OUTPATIENT)
Age: 33
End: 2023-01-25

## 2023-01-25 NOTE — REASON FOR VISIT
[[employer] ] : provided by [employer]   [Interpreters_IDNumber] : [id number] [Interpreters_FullName] : Ina

## 2023-01-30 ENCOUNTER — RESULT REVIEW (OUTPATIENT)
Age: 33
End: 2023-01-30

## 2023-01-30 ENCOUNTER — APPOINTMENT (OUTPATIENT)
Dept: OBGYN | Facility: HOSPITAL | Age: 33
End: 2023-01-30
Payer: MEDICAID

## 2023-01-30 ENCOUNTER — OUTPATIENT (OUTPATIENT)
Dept: OUTPATIENT SERVICES | Facility: HOSPITAL | Age: 33
LOS: 1 days | End: 2023-01-30

## 2023-01-30 VITALS
BODY MASS INDEX: 32.59 KG/M2 | WEIGHT: 166 LBS | SYSTOLIC BLOOD PRESSURE: 135 MMHG | HEIGHT: 60 IN | DIASTOLIC BLOOD PRESSURE: 80 MMHG | HEART RATE: 72 BPM | TEMPERATURE: 97.5 F

## 2023-01-30 DIAGNOSIS — Z90.49 ACQUIRED ABSENCE OF OTHER SPECIFIED PARTS OF DIGESTIVE TRACT: Chronic | ICD-10-CM

## 2023-01-30 DIAGNOSIS — Z98.891 HISTORY OF UTERINE SCAR FROM PREVIOUS SURGERY: Chronic | ICD-10-CM

## 2023-01-30 DIAGNOSIS — N20.0 CALCULUS OF KIDNEY: Chronic | ICD-10-CM

## 2023-01-30 PROCEDURE — 76817 TRANSVAGINAL US OBSTETRIC: CPT | Mod: 26

## 2023-01-30 PROCEDURE — 99213 OFFICE O/P EST LOW 20 MIN: CPT | Mod: TH,GE

## 2023-01-31 DIAGNOSIS — O02.1 MISSED ABORTION: ICD-10-CM

## 2023-01-31 NOTE — HISTORY OF PRESENT ILLNESS
[FreeTextEntry1] : Patient is a 31yo  LMP 10/1 presenting again for management of MAB.\par \par MAB previously diagnosed on scans. Patient wanted to follow up again to see if there was any change in the scans.\par \par 1/10 TVUS: Single IUP with estimated gestational age 7w2d. Discrete fetal pole mean sac diameter of 2.2 cm not identified, suspicious but diagnostic for pregnancy failure.\par  TVUS: IUP corresponding to gestational age 6w1d. 2 yolk sacs noted. No fetal heart rate identified.\par Findings consistent with pregnancy failure.\par  TVUS: Intrauterine gestational sac with yolk sac and fetal pole. No fetal cardiac activity present. Lack of fetus with cardiac activity over 2 weeks after a gestational sac was first detected on ultrasound consistent with pregnancy failure.\par \par Today, she denies fevers, chills, CP, SOB, abdominal pain, pelvic pain, cramping, vomiting, vaginal bleeding or discharge. She reports that she is still occasionally nauseous.

## 2023-01-31 NOTE — REASON FOR VISIT
[Follow-Up] : a follow-up evaluation of [Other: ______] : provided by WILLIAN [Time Spent: ____ minutes] : Total time spent using  services: [unfilled] minutes. The patient's primary language is not English thus required  services. [Interpreters_IDNumber] : [id number] [TWNoteComboBox1] : Bangladeshi

## 2023-01-31 NOTE — END OF VISIT
[] : Resident [FreeTextEntry3] : Extensive counseling as above\par Pt to be scheduled for DVC for MAB after she returns from traveling \par \par vivek ASHLEY

## 2023-01-31 NOTE — REVIEW OF SYSTEMS
[Nausea] : nausea [Fever] : no fever [Chills] : no chills [Dyspnea] : no dyspnea [Chest Pain] : no chest pain [Abdominal Pain] : no abdominal pain [Vomiting] : no vomiting [Abn Vaginal bleeding] : no abnormal vaginal bleeding [Pelvic pain] : no pelvic pain

## 2023-01-31 NOTE — PLAN
[FreeTextEntry1] : Patient is a 33yo  LMP 10/1 presenting again for management of MAB. Management options were discussed at prior visit medications vs. surgery vs. expectant management.\par \par The patient was again counseled extensively today on surgical vs medical management. Risks and benefits (bleeding, infection, incomplete ab) discussed for both options. She prefers the surgical option, but she is going out of town from -. She would like to have the surgery when she comes back from her travels. She does not want to do the medical management before she goes, and is nervous that she would have a complication from bleeding while she travels. Patient counseled that she may start to pass the pregnancy on her own, and that she may still have issues with bleeding or infection if she starts to pass the pregnancy before her surgery. Patient counseled on what she can expect if she passes the pregnancy on her own.\par \par Plan:\par - Will schedule D+C after  for MAB\par - Patient instructed to go to the ED if she has profuse vaginal bleeding, dizziness, or passes multiple clots. She was also instructed to see a medical provider if she starts to have fever, chills, or abnormal or foul-smelling discharge or products of conception\par -  10 H/H 12.7/38.7, no repeat labs performed today\par - All questions answered to patient satisfaction\par \par L Panella PGY1\par d/w Dr. Leger\par \par  ID: [id number]\par

## 2023-02-01 ENCOUNTER — EMERGENCY (EMERGENCY)
Facility: HOSPITAL | Age: 33
LOS: 1 days | Discharge: HOME | End: 2023-02-01
Admitting: STUDENT IN AN ORGANIZED HEALTH CARE EDUCATION/TRAINING PROGRAM
Payer: MEDICAID

## 2023-02-01 VITALS
OXYGEN SATURATION: 98 % | HEART RATE: 79 BPM | SYSTOLIC BLOOD PRESSURE: 109 MMHG | DIASTOLIC BLOOD PRESSURE: 77 MMHG | RESPIRATION RATE: 18 BRPM

## 2023-02-01 VITALS
SYSTOLIC BLOOD PRESSURE: 114 MMHG | RESPIRATION RATE: 18 BRPM | HEART RATE: 76 BPM | TEMPERATURE: 98 F | DIASTOLIC BLOOD PRESSURE: 77 MMHG | OXYGEN SATURATION: 98 %

## 2023-02-01 DIAGNOSIS — Z98.891 HISTORY OF UTERINE SCAR FROM PREVIOUS SURGERY: Chronic | ICD-10-CM

## 2023-02-01 DIAGNOSIS — N20.0 CALCULUS OF KIDNEY: Chronic | ICD-10-CM

## 2023-02-01 DIAGNOSIS — Z90.49 ACQUIRED ABSENCE OF OTHER SPECIFIED PARTS OF DIGESTIVE TRACT: Chronic | ICD-10-CM

## 2023-02-01 LAB
ALBUMIN SERPL ELPH-MCNC: 4.3 G/DL — SIGNIFICANT CHANGE UP (ref 3.3–5)
ALP SERPL-CCNC: 69 U/L — SIGNIFICANT CHANGE UP (ref 40–120)
ALT FLD-CCNC: 24 U/L — SIGNIFICANT CHANGE UP (ref 4–33)
ANION GAP SERPL CALC-SCNC: 13 MMOL/L — SIGNIFICANT CHANGE UP (ref 7–14)
APPEARANCE UR: CLEAR — SIGNIFICANT CHANGE UP
AST SERPL-CCNC: 27 U/L — SIGNIFICANT CHANGE UP (ref 4–32)
BASOPHILS # BLD AUTO: 0.04 K/UL — SIGNIFICANT CHANGE UP (ref 0–0.2)
BASOPHILS NFR BLD AUTO: 0.4 % — SIGNIFICANT CHANGE UP (ref 0–2)
BILIRUB SERPL-MCNC: 0.2 MG/DL — SIGNIFICANT CHANGE UP (ref 0.2–1.2)
BILIRUB UR-MCNC: NEGATIVE — SIGNIFICANT CHANGE UP
BLD GP AB SCN SERPL QL: NEGATIVE — SIGNIFICANT CHANGE UP
BUN SERPL-MCNC: 12 MG/DL — SIGNIFICANT CHANGE UP (ref 7–23)
CALCIUM SERPL-MCNC: 9.2 MG/DL — SIGNIFICANT CHANGE UP (ref 8.4–10.5)
CHLORIDE SERPL-SCNC: 101 MMOL/L — SIGNIFICANT CHANGE UP (ref 98–107)
CO2 SERPL-SCNC: 20 MMOL/L — LOW (ref 22–31)
COLOR SPEC: YELLOW — SIGNIFICANT CHANGE UP
CREAT SERPL-MCNC: 0.52 MG/DL — SIGNIFICANT CHANGE UP (ref 0.5–1.3)
DIFF PNL FLD: ABNORMAL
EGFR: 127 ML/MIN/1.73M2 — SIGNIFICANT CHANGE UP
EOSINOPHIL # BLD AUTO: 0.19 K/UL — SIGNIFICANT CHANGE UP (ref 0–0.5)
EOSINOPHIL NFR BLD AUTO: 1.9 % — SIGNIFICANT CHANGE UP (ref 0–6)
GLUCOSE SERPL-MCNC: 84 MG/DL — SIGNIFICANT CHANGE UP (ref 70–99)
GLUCOSE UR QL: NEGATIVE — SIGNIFICANT CHANGE UP
HCG SERPL-ACNC: SIGNIFICANT CHANGE UP MIU/ML
HCT VFR BLD CALC: 39.1 % — SIGNIFICANT CHANGE UP (ref 34.5–45)
HGB BLD-MCNC: 12.8 G/DL — SIGNIFICANT CHANGE UP (ref 11.5–15.5)
IANC: 6.65 K/UL — SIGNIFICANT CHANGE UP (ref 1.8–7.4)
IMM GRANULOCYTES NFR BLD AUTO: 0.3 % — SIGNIFICANT CHANGE UP (ref 0–0.9)
KETONES UR-MCNC: NEGATIVE — SIGNIFICANT CHANGE UP
LEUKOCYTE ESTERASE UR-ACNC: NEGATIVE — SIGNIFICANT CHANGE UP
LYMPHOCYTES # BLD AUTO: 2.67 K/UL — SIGNIFICANT CHANGE UP (ref 1–3.3)
LYMPHOCYTES # BLD AUTO: 26.5 % — SIGNIFICANT CHANGE UP (ref 13–44)
MCHC RBC-ENTMCNC: 30 PG — SIGNIFICANT CHANGE UP (ref 27–34)
MCHC RBC-ENTMCNC: 32.7 GM/DL — SIGNIFICANT CHANGE UP (ref 32–36)
MCV RBC AUTO: 91.6 FL — SIGNIFICANT CHANGE UP (ref 80–100)
MONOCYTES # BLD AUTO: 0.48 K/UL — SIGNIFICANT CHANGE UP (ref 0–0.9)
MONOCYTES NFR BLD AUTO: 4.8 % — SIGNIFICANT CHANGE UP (ref 2–14)
NEUTROPHILS # BLD AUTO: 6.65 K/UL — SIGNIFICANT CHANGE UP (ref 1.8–7.4)
NEUTROPHILS NFR BLD AUTO: 66.1 % — SIGNIFICANT CHANGE UP (ref 43–77)
NITRITE UR-MCNC: NEGATIVE — SIGNIFICANT CHANGE UP
NRBC # BLD: 0 /100 WBCS — SIGNIFICANT CHANGE UP (ref 0–0)
NRBC # FLD: 0 K/UL — SIGNIFICANT CHANGE UP (ref 0–0)
PH UR: 5.5 — SIGNIFICANT CHANGE UP (ref 5–8)
PLATELET # BLD AUTO: 273 K/UL — SIGNIFICANT CHANGE UP (ref 150–400)
POTASSIUM SERPL-MCNC: 4.2 MMOL/L — SIGNIFICANT CHANGE UP (ref 3.5–5.3)
POTASSIUM SERPL-SCNC: 4.2 MMOL/L — SIGNIFICANT CHANGE UP (ref 3.5–5.3)
PROT SERPL-MCNC: 7.4 G/DL — SIGNIFICANT CHANGE UP (ref 6–8.3)
PROT UR-MCNC: ABNORMAL
RBC # BLD: 4.27 M/UL — SIGNIFICANT CHANGE UP (ref 3.8–5.2)
RBC # FLD: 12.9 % — SIGNIFICANT CHANGE UP (ref 10.3–14.5)
RH IG SCN BLD-IMP: POSITIVE — SIGNIFICANT CHANGE UP
SARS-COV-2 RNA SPEC QL NAA+PROBE: SIGNIFICANT CHANGE UP
SODIUM SERPL-SCNC: 134 MMOL/L — LOW (ref 135–145)
SP GR SPEC: 1.03 — SIGNIFICANT CHANGE UP (ref 1.01–1.05)
UROBILINOGEN FLD QL: SIGNIFICANT CHANGE UP
WBC # BLD: 10.06 K/UL — SIGNIFICANT CHANGE UP (ref 3.8–10.5)
WBC # FLD AUTO: 10.06 K/UL — SIGNIFICANT CHANGE UP (ref 3.8–10.5)

## 2023-02-01 PROCEDURE — 99284 EMERGENCY DEPT VISIT MOD MDM: CPT

## 2023-02-01 PROCEDURE — 76817 TRANSVAGINAL US OBSTETRIC: CPT | Mod: 26

## 2023-02-01 NOTE — ED ADULT TRIAGE NOTE - LOCATION:
-- DO NOT REPLY / DO NOT REPLY ALL --  -- Message is from the [facility]--    Offered Waitlist if Available for the Visit Type? No    Caller is requesting an appointment - at a sooner time than what was available.      Caller wants sooner appointment - offered trusted partner & sister site            Patient is willing to be seen by: PCP only    Reason for Visit: sCHOOL PHYSICAL     Is the patient currently scheduled? No    Preferred time to be seen: asap     Caller Information       Type Contact Phone    11/09/2021 09:59 AM CST Phone (Incoming) [place]  [phone]          Alternative phone number: NONE    Turnaround time given to caller:   \"This message will be sent to [state Provider's name]. The clinical team will fulfill your request as soon as they review your message.\"  
Called, no answer, left vm  
task  
Left arm;

## 2023-02-01 NOTE — CONSULT NOTE ADULT - ASSESSMENT
Patient is a 31yo  LMP 10/1 presenting for dizziness, headache, brown discharge in the setting of known MAB. Currently surgical planning for outpatient D&C. Pt with stable vital signs, normal H/h. No significant discharge or bleeding noted on exam. Benign abdominal and pelvic exam.  Clinical status reassuring.   - no acute GYN intervention   - Pt added on for D&C for MAB / w Dr Mcmahan  - COVID swab for PST  - PO hydrate, tylenol for headache    D/w Dr Marj Madrid-Lee PGY2  Patient is a 31yo  LMP 10/1 presenting for dizziness, headache, brown discharge in the setting of known MAB.  Currently surgical planning for outpatient D&C. Pt with stable vital signs, normal H/h. No significant discharge or bleeding noted on exam. Benign abdominal and pelvic exam.  TVUS unchanged from prior, consistent with pregnancy failure. Clinical status reassuring.   - no acute GYN intervention   - Pt added on for D&C for MAB  w Dr Mcmahan  - COVID swab for PST  - PO hydrate, tylenol for headache    D/w Dr Marj Madrid-Lee PGY2

## 2023-02-01 NOTE — ED ADULT NURSE NOTE - CHIEF COMPLAINT QUOTE
Pt AOX4  approx 8-10 weeks pregnant c/o vaginal bleeding and cramping; pt is seen in our OB clinic, was told pregnancy is not viable and is scheduled for D&C on the ; started noting brownish blood 2 days ago, told to come to ER; Hx of    G2 T1  L1  Int Diane # [phone]

## 2023-02-01 NOTE — CONSULT NOTE ADULT - SUBJECTIVE AND OBJECTIVE BOX
*documentation delayed due to clinical duties*  *documentation delayed due to clinical duties*  *documentation delayed due to clinical duties*    JARED COHENARRKIRBY  32y  Female [id number]    HPI: 31yo  LMP 10/1 presents with dizziness, brown discharge. Patient with known MAB confirmed in clinic, in process of scheduling for D&C outpatient.   She reports that she is still occasionally nauseous.  Reports some brown discharge that started since she had the last ultrasound a few days ago. Denies vaginal bleeding. Also reports waking up with some dizziness this morning, associated with headache. Has not eaten or drank anything since this AM.  has not taken anything for the pain at home.  She denies fevers, chills, CP, SOB, abdominal pain, pelvic pain, vomiting.     1/10 TVUS: Single IUP with estimated gestational age 7w2d. Discrete fetal pole mean sac diameter of 2.2 cm not identified, suspicious but diagnostic for pregnancy failure.   TVUS: IUP corresponding to gestational age 6w1d. 2 yolk sacs noted. No fetal heart rate identified.  Findings consistent with pregnancy failure.   TVUS: Intrauterine gestational sac with yolk sac and fetal pole. No fetal cardiac activity present. Lack of fetus with cardiac activity over 2 weeks after a gestational sac was first detected on ultrasound consistent with pregnancy failure.    Name of GYN Physician: [facility]   POB:  CSx1   Pgyn: Denies fibroids, cysts, endometriosis, STI's. ASCUS/HPV+   PMH: fatty liver  PSH:CSx1, cholecystectomy   Home meds:   All:  NKDA      PAST MEDICAL & SURGICAL HISTORY:  Pyelonephritis affecting pregnancy  admit 2020 rx with cephtriaxone  Gallstone  Kidney infection  H/O:   Calculus, kidney  S/P cholecystectomy      Vital Signs Last 24 Hrs  T(C): 36.9 (2023 13:23), Max: 36.9 (2023 13:23)  T(F): 98.4 (2023 13:23), Max: 98.4 (2023 13:23)  HR: 79 (2023 15:13) (76 - 79)  BP: 109/77 (2023 15:13) (109/77 - 116/74)  BP(mean): --  RR: 18 (2023 15:13) (18 - 18)  SpO2: 98% (2023 15:13) (98% - 98%)    Parameters below as of 2023 15:13  Patient On (Oxygen Delivery Method): room air      Physical Exam:   General: sitting comftorably in bed, NAD   Abd: Soft, non-tender, non-distended.    :  No bleeding on pad.  External labia wnl.  Bimanual exam with no cervical motion tenderness, uterus wnl, adnexa non palpable b/l.  Cervix closed   Speculum Exam: Scant brown discharge.   No active bleeding from os.    Ext: non-tender b/l, no edema     LABS:                          12.8   10.06 )-----------( 273      ( 2023 13:20 )             39.1     02-01    134<L>  |  101  |  12  ----------------------------<  84  4.2   |  20<L>  |  0.52    Ca    9.2      2023 13:20    TPro  7.4  /  Alb  4.3  /  TBili  0.2  /  DBili  x   /  AST  27  /  ALT  24  /  AlkPhos  69  02-01    I&O's Detail      Urinalysis Basic - ( 2023 13:18 )    Color: Yellow / Appearance: Clear / S.034 / pH: x  Gluc: x / Ketone: Negative  / Bili: Negative / Urobili: <2 mg/dL   Blood: x / Protein: Trace / Nitrite: Negative   Leuk Esterase: Negative / RBC: 8 /HPF / WBC 3 /HPF   Sq Epi: x / Non Sq Epi: 5 /HPF / Bacteria: Occasional *documentation delayed due to clinical duties*  *documentation delayed due to clinical duties*  *documentation delayed due to clinical duties*    JARED COHENARRKIRBY  32y  Female [id number]    HPI: 31yo  LMP 10/1 presents with dizziness, brown discharge. Patient with known MAB confirmed in clinic, in process of scheduling for D&C outpatient.   She reports that she is still occasionally nauseous.  Reports some brown discharge that started since she had the last ultrasound a few days ago. Denies vaginal bleeding. Also reports waking up with some dizziness this morning, associated with headache. Has not eaten or drank anything since this AM.  has not taken anything for the pain at home.  She denies fevers, chills, CP, SOB, abdominal pain, pelvic pain, vomiting.     1/10 TVUS: Single IUP with estimated gestational age 7w2d. Discrete fetal pole mean sac diameter of 2.2 cm not identified, suspicious but diagnostic for pregnancy failure.   TVUS: IUP corresponding to gestational age 6w1d. 2 yolk sacs noted. No fetal heart rate identified.  Findings consistent with pregnancy failure.   TVUS: Intrauterine gestational sac with yolk sac and fetal pole. No fetal cardiac activity present. Lack of fetus with cardiac activity over 2 weeks after a gestational sac was first detected on ultrasound consistent with pregnancy failure.    Name of GYN Physician: [facility]   POB:  CSx1   Pgyn: Denies fibroids, cysts, endometriosis, STI's. ASCUS/HPV+   PMH: fatty liver  PSH:CSx1, cholecystectomy   Home meds:   All:  NKDA      PAST MEDICAL & SURGICAL HISTORY:  Pyelonephritis affecting pregnancy  admit 2020 rx with cephtriaxone  Gallstone  Kidney infection  H/O:   Calculus, kidney  S/P cholecystectomy      Vital Signs Last 24 Hrs  T(C): 36.9 (2023 13:23), Max: 36.9 (2023 13:23)  T(F): 98.4 (2023 13:23), Max: 98.4 (2023 13:23)  HR: 79 (2023 15:13) (76 - 79)  BP: 109/77 (2023 15:13) (109/77 - 116/74)  BP(mean): --  RR: 18 (2023 15:13) (18 - 18)  SpO2: 98% (2023 15:13) (98% - 98%)    Parameters below as of 2023 15:13  Patient On (Oxygen Delivery Method): room air      Physical Exam:   General: sitting comftorably in bed, NAD   Abd: Soft, non-tender, non-distended.    :  No bleeding on pad.  External labia wnl.  Bimanual exam with no cervical motion tenderness, uterus wnl, adnexa non palpable b/l.  Cervix closed   Speculum Exam: Scant brown discharge.   No active bleeding from os.    Ext: non-tender b/l, no edema     LABS:                          12.8   10.06 )-----------( 273      ( 2023 13:20 )             39.1     02    134<L>  |  101  |  12  ----------------------------<  84  4.2   |  20<L>  |  0.52    Ca    9.2      2023 13:20    TPro  7.4  /  Alb  4.3  /  TBili  0.2  /  DBili  x   /  AST  27  /  ALT  24  /  AlkPhos  69  02-01    I&O's Detail      Urinalysis Basic - ( 2023 13:18 )    Color: Yellow / Appearance: Clear / S.034 / pH: x  Gluc: x / Ketone: Negative  / Bili: Negative / Urobili: <2 mg/dL   Blood: x / Protein: Trace / Nitrite: Negative   Leuk Esterase: Negative / RBC: 8 /HPF / WBC 3 /HPF   Sq Epi: x / Non Sq Epi: 5 /HPF / Bacteria: Occasional    < from: US Transvaginal, OB (23 @ 13:56) >    ACC: [id number] EXAM:  US OB TRANSVAGINAL   ORDERED BY: YANY ESPINOZA     PROCEDURE DATE:  2023          INTERPRETATION:  CLINICAL INFORMATION: Vaginal bleeding in pregnancy.    LMP: 10/1/2022    Estimated Gestational Age by LMP: 17 weeks 4 days    COMPARISON: None available.    Endovaginal pelvic sonogram only.    FINDINGS:  Uterus: Anteverted uterus demonstrating a single intrauterine gestation   with a yolk sac and fetal pole. No embryonic heart rate is identified.    Gestational Sac Size (mean): N/A  Gestations Sac Shape : Normal.  Crown Rump Length: 4.6 mm  Estimated Gestational Age: 7 weeks 0 days  Yolk Sac: Normal  Fetal Heart Rate: None    Right ovary: 3.4 cm x 2.6 cm x 2.7 cm. Within normal limits. Right corpus   luteum.  Left ovary: 2.6 cm x 1.5 cm x 1.9 cm. Within normal limits.    Fluid: None.    IMPRESSION:  Findings consistent with embryonic demise.      --- End of Report ---      RENO WILSON MD; Attending Radiologist  This document has been electronically signed. 2023  2:18PM    < end of copied text >

## 2023-02-01 NOTE — ED PROVIDER NOTE - NSFOLLOWUPINSTRUCTIONS_ED_ALL_ED_FT
Follow up in 2 days for D and C on 2/3    Worsening, continued or new concerning symptoms return to the emergency department.

## 2023-02-01 NOTE — ED PROVIDER NOTE - OBJECTIVE STATEMENT
33 y/o female with no pmhx presents to ED  LMP 2022 about 10 weeks pregnant per US presents to ED complaining of vaginal spotting for 2 days.  Patient noted to have brown blood.  No pelvic pain.  Patient has had 3 ultrasounds at the OB/GYN clinic at [facility] that confirmed a nonviable pregnancy and scheduled for D&C on .  Patient came to ED today because she started to have bleeding x 2 days and feeling lightheaded.  No chest pain, shortness of breath, palpitations, nausea or vomiting dysuria.

## 2023-02-01 NOTE — ED PROVIDER NOTE - PATIENT PORTAL LINK FT
You can access the FollowMyHealth Patient Portal offered by [facility] by registering at the following website: [email or web address]. By joining [facility]’s FollowMyHealth portal, you will also be able to view your health information using other applications (apps) compatible with our system.

## 2023-02-01 NOTE — ED PROVIDER NOTE - CLINICAL SUMMARY MEDICAL DECISION MAKING FREE TEXT BOX
33 y/o female with no pmhx presents to ED  LMP 2022 about 10 weeks pregnant per US presents to ED complaining of vaginal spotting for 2 days.  Patient noted to have brown blood.  No pelvic pain.  Patient has had 3 ultrasounds at the OB/GYN clinic at [facility] that confirmed a nonviable pregnancy and scheduled for D&C on .  Patient came to ED today because she started to have bleeding x 2 days and feeling lightheaded. pt well appearing, abd soft NT +small bleeding on exam. plan to check labs, h/h, TVUS, consult OB

## 2023-02-01 NOTE — ED ADULT NURSE NOTE - OBJECTIVE STATEMENT
p/t is a 32y old female about 8 weeks pregnant, c/o of vag bleed and cramps since last night, p/t denies any other discomfort, labs drawn and sent as per MD order

## 2023-02-02 ENCOUNTER — RESULT REVIEW (OUTPATIENT)
Age: 33
End: 2023-02-02

## 2023-02-02 ENCOUNTER — TRANSCRIPTION ENCOUNTER (OUTPATIENT)
Age: 33
End: 2023-02-02

## 2023-02-03 ENCOUNTER — TRANSCRIPTION ENCOUNTER (OUTPATIENT)
Age: 33
End: 2023-02-03

## 2023-02-03 ENCOUNTER — APPOINTMENT (OUTPATIENT)
Dept: OBGYN | Facility: HOSPITAL | Age: 33
End: 2023-02-03

## 2023-02-03 ENCOUNTER — OUTPATIENT (OUTPATIENT)
Dept: OUTPATIENT SERVICES | Facility: HOSPITAL | Age: 33
LOS: 1 days | Discharge: HOME | End: 2023-02-03
Payer: MEDICAID

## 2023-02-03 VITALS
TEMPERATURE: 98 F | HEART RATE: 66 BPM | HEIGHT: 60 IN | SYSTOLIC BLOOD PRESSURE: 102 MMHG | RESPIRATION RATE: 15 BRPM | OXYGEN SATURATION: 99 % | WEIGHT: 164.02 LBS | DIASTOLIC BLOOD PRESSURE: 64 MMHG

## 2023-02-03 VITALS
HEART RATE: 82 BPM | SYSTOLIC BLOOD PRESSURE: 124 MMHG | RESPIRATION RATE: 16 BRPM | DIASTOLIC BLOOD PRESSURE: 84 MMHG | OXYGEN SATURATION: 99 %

## 2023-02-03 DIAGNOSIS — N20.0 CALCULUS OF KIDNEY: Chronic | ICD-10-CM

## 2023-02-03 DIAGNOSIS — Z90.49 ACQUIRED ABSENCE OF OTHER SPECIFIED PARTS OF DIGESTIVE TRACT: Chronic | ICD-10-CM

## 2023-02-03 DIAGNOSIS — O02.1 MISSED ABORTION: ICD-10-CM

## 2023-02-03 DIAGNOSIS — Z98.891 HISTORY OF UTERINE SCAR FROM PREVIOUS SURGERY: Chronic | ICD-10-CM

## 2023-02-03 LAB — HCG UR QL: POSITIVE

## 2023-02-03 PROCEDURE — 59812 TREATMENT OF MISCARRIAGE: CPT | Mod: GC

## 2023-02-03 PROCEDURE — 88305 TISSUE EXAM BY PATHOLOGIST: CPT | Mod: 26

## 2023-02-03 RX ORDER — HYDROMORPHONE HYDROCHLORIDE 2 MG/ML
0.5 INJECTION INTRAMUSCULAR; INTRAVENOUS; SUBCUTANEOUS
Refills: 0 | Status: DISCONTINUED | OUTPATIENT
Start: 2023-02-03 | End: 2023-02-03

## 2023-02-03 RX ORDER — SODIUM CHLORIDE 9 MG/ML
1000 INJECTION, SOLUTION INTRAVENOUS
Refills: 0 | Status: DISCONTINUED | OUTPATIENT
Start: 2023-02-03 | End: 2023-02-17

## 2023-02-03 RX ORDER — FENTANYL CITRATE 50 UG/ML
25 INJECTION INTRAVENOUS
Refills: 0 | Status: DISCONTINUED | OUTPATIENT
Start: 2023-02-03 | End: 2023-02-03

## 2023-02-03 RX ORDER — OXYCODONE HYDROCHLORIDE 5 MG/1
5 TABLET ORAL ONCE
Refills: 0 | Status: DISCONTINUED | OUTPATIENT
Start: 2023-02-03 | End: 2023-02-03

## 2023-02-03 RX ORDER — ONDANSETRON 8 MG/1
4 TABLET, FILM COATED ORAL ONCE
Refills: 0 | Status: DISCONTINUED | OUTPATIENT
Start: 2023-02-03 | End: 2023-02-17

## 2023-02-03 NOTE — ASU DISCHARGE PLAN (ADULT/PEDIATRIC) - PROVIDER TOKENS
FREE:[LAST:[[facility]],PHONE:[(   )    -],FAX:[(   )    -],ADDRESS:[[place] [address]  [phone]]]

## 2023-02-03 NOTE — ASU DISCHARGE PLAN (ADULT/PEDIATRIC) - NS MD DC FALL RISK RISK
For information on Fall & Injury Prevention, visit: [email or web address] OR  [email or web address] OR  https://www.cdc.gov/steadi/patient.html

## 2023-02-03 NOTE — ASU DISCHARGE PLAN (ADULT/PEDIATRIC) - NO TAMPONS DURATION
Pharmacy requesting refill: Glimepiride  Last OV: 7/7/20  Next OV: none scheduled  Last refill: 6/8/20  Last labs: a1c 6/30/20    Refill sent.  
2 weeks

## 2023-02-03 NOTE — ASU DISCHARGE PLAN (ADULT/PEDIATRIC) - CARE PROVIDER_API CALL
[facility],   Oncology [facility], [facility]  [address] [place] [id number]  [phone]  Phone: (   )    -  Fax: (   )    -  Follow Up Time:

## 2023-02-03 NOTE — H&P PST ADULT - HISTORY OF PRESENT ILLNESS
HPI: 33yo  LMP 10/1 presents with dizziness, brown discharge. Patient with known MAB confirmed in clinic, in process of scheduling for D&C outpatient.   She reports that she is still occasionally nauseous.  Reports some brown discharge that started since she had the last ultrasound a few days ago. Denies vaginal bleeding. Also reports waking up with some dizziness this morning, associated with headache. Has not eaten or drank anything since this AM.  has not taken anything for the pain at home.  She denies fevers, chills, CP, SOB, abdominal pain, pelvic pain, vomiting.     1/10 TVUS: Single IUP with estimated gestational age 7w2d. Discrete fetal pole mean sac diameter of 2.2 cm not identified, suspicious but diagnostic for pregnancy failure.   TVUS: IUP corresponding to gestational age 6w1d. 2 yolk sacs noted. No fetal heart rate identified.  Findings consistent with pregnancy failure.   TVUS: Intrauterine gestational sac with yolk sac and fetal pole. No fetal cardiac activity present. Lack of fetus with cardiac activity over 2 weeks after a gestational sac was first detected on ultrasound consistent with pregnancy failure.    Name of GYN Physician: [facility]   POB:  CSx1   Pgyn: Denies fibroids, cysts, endometriosis, STI's. ASCUS/HPV+   PMH: fatty liver  PSH:CSx1, cholecystectomy   Home meds:   All:  NKDA

## 2023-02-03 NOTE — H&P PST ADULT - NSICDXPROCEDURE_GEN_ALL_CORE_FT
PROCEDURES:  Dilation and curettage, uterus, using suction, for missed first trimester  2023 12:50:36  Rachell Smith

## 2023-02-03 NOTE — ASU PREOP CHECKLIST - DNR CLARIFICATION FORM COMPLETED
POST-OP INSTRUCTIONS For Stent Placement    After the procedure you may experience the following symptoms. All of these are normal while a stent is in place  - Urinary frequency (urinating more often than usual)  - Urinary urgency (the sensation that you need to urinate right away)  - Painful urination (this can be pain in your bladder or in your back when you urinate)  - Blood in your urine (a stent can irritate the lining of your bladder causing it to bleed)  - Back/Flank pain, especially with urination    Medications:  Ibuprofen - take as needed for pain, as the anti-inflammatory effects can be helpful  Oxybutynin (Ditropan) - Please take 3 times per day as needed while the stent is in place, to reduce bladder spasms.  Watch for the common side effects, including but not limited to dry mouth, constipation, blurry vision, headache, dizziness, drowsiness, urinary retention, cognitive impairment, and call if you're having problems with these.  Percocet - Please take as needed for pain  Senna-s (stool softener) - Please take while taking narcotics, in order to prevent constipation.  Hold for loose stools  Antiobiotics - as prescribed by the hospitalist prior to discharge      Activity:  You may resume normal activities as tolerated. No lifting restrictions.  Assistive Devices:  N/A, unless using one at baseline  Bathing:  No restrictions  Dressing:  N/A  Incision:  N/A  Stent:  If your stent has a string hanging from urethra, please do not cut the string  Diet:  No dietary restrictions  Return to work:  You may return to work the day after your procedure, if you are no longer taking narcotics.  Discharge to: Home           The patient was instructed to call Urology On call Physician at [phone] should they experience any of the following:       - Temperature >101.4       - Nausea, vomiting, diarrhea or constipation       - Increased pain not controlled with PO pain medications       - Signs or symptoms of  infections       - Decreased urine output or inability to urinate.    If you have any problems or questions related to your anesthesia, call the [facility] and ask for the anesthesiologist on call.  Although you may feel well, the medications given you may affect you for several hours.    Safety:    -Call for severe nausea.    -Do NOT drive your car for 24 hours, or as long as you are taking pain medications.  -Do NOT take part in activities requiring coordination or judgement for 24 hours (using lawn mowers, power tools, gas stove, food processors, etc)  -Do NOT make important decisions for 48 hours (signing contracts, montoya, etc)  -Go directly home and have a responsible adult available to help you.  -Do NOT drink alcoholic beverages for 24 hours  -Drink only clear liquids if you feel sick to your stomach  -Eat a regular meal as tolerated, if you don't feel sick.    Follow Up:  Dr. Duarte's surgery schedule will contact you to set up your next procedure date to remove the stone.  You will be contacted with the appointment date and time.  If you are not contacted within 48 hours, please call [phone].       n/a

## 2023-02-03 NOTE — BRIEF OPERATIVE NOTE - OPERATION/FINDINGS
Normal external genitalia, 8wk size anteverted uterus  POC seen on suction curettage. Gritty texture noted on sharp curettage.  Additional bleeding from uterus at the end of the case resolved with bimanual massage. Sonogram showed thin endometrial stripe and no remaining POC. Cytotec 1000 MN administered.

## 2023-02-05 NOTE — ED POST DISCHARGE NOTE - RESULT SUMMARY
UCX : Enterococcus faecalis 50,000-99,000CFU/ml . No antibiotic listed in ED provider note or prescription writer at time of discharge. Patient has nonviable pregnancy and scheduled for SX on 2/7/23 message left with Call Back  P.A. number and hours for return call back.

## 2023-02-10 LAB — SURGICAL PATHOLOGY STUDY: SIGNIFICANT CHANGE UP

## 2023-02-13 NOTE — CHART NOTE - NSCHARTNOTEFT_GEN_A_CORE
ID [id number]    Discussed pathology results from [facility]. Patient expressed understanding and has a post op check on 2/22    Surgical Pathology (2/3)  Products of conception:  - Products of conception with chorionic villi    Rachell Smith, PGY1

## 2023-02-22 ENCOUNTER — OUTPATIENT (OUTPATIENT)
Dept: OUTPATIENT SERVICES | Facility: HOSPITAL | Age: 33
LOS: 1 days | End: 2023-02-22

## 2023-02-22 ENCOUNTER — APPOINTMENT (OUTPATIENT)
Dept: OBGYN | Facility: HOSPITAL | Age: 33
End: 2023-02-22
Payer: MEDICAID

## 2023-02-22 VITALS
HEIGHT: 60 IN | WEIGHT: 169 LBS | HEART RATE: 65 BPM | SYSTOLIC BLOOD PRESSURE: 107 MMHG | BODY MASS INDEX: 33.18 KG/M2 | TEMPERATURE: 98.1 F | DIASTOLIC BLOOD PRESSURE: 72 MMHG

## 2023-02-22 DIAGNOSIS — N20.0 CALCULUS OF KIDNEY: Chronic | ICD-10-CM

## 2023-02-22 DIAGNOSIS — O02.1 MISSED ABORTION: ICD-10-CM

## 2023-02-22 DIAGNOSIS — Z90.49 ACQUIRED ABSENCE OF OTHER SPECIFIED PARTS OF DIGESTIVE TRACT: Chronic | ICD-10-CM

## 2023-02-22 DIAGNOSIS — Z09 ENCOUNTER FOR FOLLOW-UP EXAMINATION AFTER COMPLETED TREATMENT FOR CONDITIONS OTHER THAN MALIGNANT NEOPLASM: ICD-10-CM

## 2023-02-22 DIAGNOSIS — Z98.891 HISTORY OF UTERINE SCAR FROM PREVIOUS SURGERY: Chronic | ICD-10-CM

## 2023-02-22 PROCEDURE — 99024 POSTOP FOLLOW-UP VISIT: CPT

## 2023-02-23 DIAGNOSIS — Z09 ENCOUNTER FOR FOLLOW-UP EXAMINATION AFTER COMPLETED TREATMENT FOR CONDITIONS OTHER THAN MALIGNANT NEOPLASM: ICD-10-CM

## 2023-02-23 DIAGNOSIS — O02.1 MISSED ABORTION: ICD-10-CM

## 2023-02-25 ENCOUNTER — NON-APPOINTMENT (OUTPATIENT)
Age: 33
End: 2023-02-25

## 2023-03-10 ENCOUNTER — APPOINTMENT (OUTPATIENT)
Dept: OBGYN | Facility: HOSPITAL | Age: 33
End: 2023-03-10

## 2023-03-10 ENCOUNTER — OUTPATIENT (OUTPATIENT)
Dept: OUTPATIENT SERVICES | Facility: HOSPITAL | Age: 33
LOS: 1 days | End: 2023-03-10

## 2023-03-10 VITALS
BODY MASS INDEX: 33.77 KG/M2 | TEMPERATURE: 97.3 F | SYSTOLIC BLOOD PRESSURE: 127 MMHG | HEIGHT: 60 IN | HEART RATE: 80 BPM | WEIGHT: 172 LBS | DIASTOLIC BLOOD PRESSURE: 80 MMHG

## 2023-03-10 DIAGNOSIS — Z98.891 HISTORY OF UTERINE SCAR FROM PREVIOUS SURGERY: Chronic | ICD-10-CM

## 2023-03-10 DIAGNOSIS — Z90.49 ACQUIRED ABSENCE OF OTHER SPECIFIED PARTS OF DIGESTIVE TRACT: Chronic | ICD-10-CM

## 2023-03-10 DIAGNOSIS — N20.0 CALCULUS OF KIDNEY: Chronic | ICD-10-CM

## 2023-03-10 DIAGNOSIS — Z30.017 ENCOUNTER FOR INITIAL PRESCRIPTION OF IMPLANTABLE SUBDERMAL CONTRACEPTIVE: ICD-10-CM

## 2023-03-10 LAB
HCG UR QL: NEGATIVE
QUALITY CONTROL: YES

## 2023-03-10 RX ORDER — ETONOGESTREL 68 MG/1
68 IMPLANT SUBCUTANEOUS
Qty: 0 | Refills: 0 | Status: COMPLETED | OUTPATIENT
Start: 2023-03-10

## 2023-03-10 RX ADMIN — ETONOGESTREL 0 MG: 68 IMPLANT SUBCUTANEOUS at 00:00

## 2023-03-13 DIAGNOSIS — Z30.017 ENCOUNTER FOR INITIAL PRESCRIPTION OF IMPLANTABLE SUBDERMAL CONTRACEPTIVE: ICD-10-CM

## 2023-03-13 DIAGNOSIS — Z00.00 ENCOUNTER FOR GENERAL ADULT MEDICAL EXAMINATION WITHOUT ABNORMAL FINDINGS: ICD-10-CM

## 2023-03-24 ENCOUNTER — APPOINTMENT (OUTPATIENT)
Dept: OBGYN | Facility: HOSPITAL | Age: 33
End: 2023-03-24

## 2023-06-15 NOTE — END OF VISIT
Group Topic:  Process Group     Date: 6/15/2023  Start Time: 1400  End Time: 1500  Facilitators: Mark Ibarra    Focus: Process Group.  Number in attendance: 4    Confidentially was explained. Group members were encouraged to share honestly and be receptive to feedback. The purpose of process group was explained, noting this is an open group for PTs to process their current and past experiences as well as the impact of their mental health on their functioning.  Method: Group  Attendance: Present  Participation: Active  Patient Response: Demonstrated insight  Mood: Normal  Affect: Type: Euthymic (normal mood)   Range: Full (normal)   Congruency: Congruent   Stability: Stable  Behavior/Socialization: Cooperative  Thought Process: Demonstrated insight  Task Performance: Follows directions  Patient Evaluation: Independent - full participation   Other behaviors observed: PT was able to maintain focus during group discussion.    Contribution to group session: PT was cooperative with all group activities as evident of her sharing during group discussion and being an active listener when peers are sharing. PT was able to share having struggles with low self esteem in the past however it has gotten better over time. PT shared understanding how not believing you are worth anything can prevent you from reaching out for support when need it. PT was able to verbalize one reason for not reaching out in the past as not wanting to be a burden to family with her issues. PT's feelings and thoughts where validated and encouraged to continue working on implantation of positive coping mechanisms and identifying barriers to communication. PT was open to feedback from peers, PT was able to provide positive feedback to her peers. Encouragement and support provided.     Plan: Continue to attend group therapy session on the unit, acquire and practice positive coping mechanisms, build insight into mental health symptoms and impact on  functioning, identify and work on individual treatment goals.          [] : Resident [FreeTextEntry3] : Pt seen and evaluated by me with Dr Kothari acting as interpretor with pt's permission. Agree with findings, assessment and plan documented in resident note.

## 2023-07-05 ENCOUNTER — APPOINTMENT (OUTPATIENT)
Dept: ULTRASOUND IMAGING | Facility: IMAGING CENTER | Age: 33
End: 2023-07-05

## 2023-07-14 ENCOUNTER — APPOINTMENT (OUTPATIENT)
Dept: MAMMOGRAPHY | Facility: IMAGING CENTER | Age: 33
End: 2023-07-14

## 2023-07-14 ENCOUNTER — APPOINTMENT (OUTPATIENT)
Dept: ULTRASOUND IMAGING | Facility: IMAGING CENTER | Age: 33
End: 2023-07-14

## 2023-07-21 ENCOUNTER — APPOINTMENT (OUTPATIENT)
Dept: OBGYN | Facility: HOSPITAL | Age: 33
End: 2023-07-21

## 2023-09-25 NOTE — PRE-OP CHECKLIST - WEIGHT IN KG
64 Bcc Infiltrative Histology Text: There were numerous aggregates of basaloid cells demonstrating an infiltrative pattern.

## 2023-10-17 ENCOUNTER — RESULT REVIEW (OUTPATIENT)
Age: 33
End: 2023-10-17

## 2023-10-17 ENCOUNTER — APPOINTMENT (OUTPATIENT)
Dept: OBGYN | Facility: HOSPITAL | Age: 33
End: 2023-10-17
Payer: COMMERCIAL

## 2023-10-17 ENCOUNTER — OUTPATIENT (OUTPATIENT)
Dept: OUTPATIENT SERVICES | Facility: HOSPITAL | Age: 33
LOS: 1 days | End: 2023-10-17

## 2023-10-17 VITALS
BODY MASS INDEX: 34.36 KG/M2 | WEIGHT: 175 LBS | HEIGHT: 60 IN | HEART RATE: 76 BPM | TEMPERATURE: 97.8 F | SYSTOLIC BLOOD PRESSURE: 135 MMHG | DIASTOLIC BLOOD PRESSURE: 81 MMHG

## 2023-10-17 DIAGNOSIS — Z90.49 ACQUIRED ABSENCE OF OTHER SPECIFIED PARTS OF DIGESTIVE TRACT: Chronic | ICD-10-CM

## 2023-10-17 DIAGNOSIS — Z98.891 HISTORY OF UTERINE SCAR FROM PREVIOUS SURGERY: Chronic | ICD-10-CM

## 2023-10-17 DIAGNOSIS — N60.02 SOLITARY CYST OF LEFT BREAST: ICD-10-CM

## 2023-10-17 DIAGNOSIS — Z30.46 ENCOUNTER FOR SURVEILLANCE OF IMPLANTABLE SUBDERMAL CONTRACEPTIVE: ICD-10-CM

## 2023-10-17 PROCEDURE — XXXXX: CPT

## 2023-10-18 DIAGNOSIS — N60.02 SOLITARY CYST OF LEFT BREAST: ICD-10-CM

## 2023-10-18 DIAGNOSIS — Z30.46 ENCOUNTER FOR SURVEILLANCE OF IMPLANTABLE SUBDERMAL CONTRACEPTIVE: ICD-10-CM

## 2023-10-24 NOTE — OB RN PREOPERATIVE CHECKLIST - IDENTIFICATION BAND VERIFIED
done Billing Type: Third-Party Bill Bill For Surgical Tray: no Performing Laboratory: [id number] Expected Date Of Service: 10/24/2023

## 2023-11-08 ENCOUNTER — APPOINTMENT (OUTPATIENT)
Dept: OBGYN | Facility: HOSPITAL | Age: 33
End: 2023-11-08

## 2023-11-21 ENCOUNTER — APPOINTMENT (OUTPATIENT)
Dept: ULTRASOUND IMAGING | Facility: CLINIC | Age: 33
End: 2023-11-21
Payer: COMMERCIAL

## 2023-11-21 ENCOUNTER — APPOINTMENT (OUTPATIENT)
Dept: MAMMOGRAPHY | Facility: CLINIC | Age: 33
End: 2023-11-21
Payer: COMMERCIAL

## 2023-11-21 ENCOUNTER — OUTPATIENT (OUTPATIENT)
Dept: OUTPATIENT SERVICES | Facility: HOSPITAL | Age: 33
LOS: 1 days | End: 2023-11-21
Payer: SELF-PAY

## 2023-11-21 ENCOUNTER — RESULT REVIEW (OUTPATIENT)
Age: 33
End: 2023-11-21

## 2023-11-21 DIAGNOSIS — Z98.891 HISTORY OF UTERINE SCAR FROM PREVIOUS SURGERY: Chronic | ICD-10-CM

## 2023-11-21 DIAGNOSIS — Z90.49 ACQUIRED ABSENCE OF OTHER SPECIFIED PARTS OF DIGESTIVE TRACT: Chronic | ICD-10-CM

## 2023-11-21 DIAGNOSIS — N20.0 CALCULUS OF KIDNEY: Chronic | ICD-10-CM

## 2023-11-21 DIAGNOSIS — N60.02 SOLITARY CYST OF LEFT BREAST: ICD-10-CM

## 2023-11-21 PROCEDURE — 76641 ULTRASOUND BREAST COMPLETE: CPT | Mod: 26,50

## 2023-11-21 PROCEDURE — 76641 ULTRASOUND BREAST COMPLETE: CPT

## 2023-11-29 ENCOUNTER — APPOINTMENT (OUTPATIENT)
Dept: INTERNAL MEDICINE | Facility: CLINIC | Age: 33
End: 2023-11-29
Payer: COMMERCIAL

## 2023-11-29 ENCOUNTER — OUTPATIENT (OUTPATIENT)
Dept: OUTPATIENT SERVICES | Facility: HOSPITAL | Age: 33
LOS: 1 days | End: 2023-11-29

## 2023-11-29 VITALS
SYSTOLIC BLOOD PRESSURE: 124 MMHG | RESPIRATION RATE: 16 BRPM | OXYGEN SATURATION: 98 % | DIASTOLIC BLOOD PRESSURE: 70 MMHG | HEART RATE: 69 BPM | BODY MASS INDEX: 34.36 KG/M2 | WEIGHT: 175 LBS | HEIGHT: 60 IN

## 2023-11-29 DIAGNOSIS — G44.209 TENSION-TYPE HEADACHE, UNSPECIFIED, NOT INTRACTABLE: ICD-10-CM

## 2023-11-29 DIAGNOSIS — Z90.49 ACQUIRED ABSENCE OF OTHER SPECIFIED PARTS OF DIGESTIVE TRACT: Chronic | ICD-10-CM

## 2023-11-29 DIAGNOSIS — Z00.00 ENCOUNTER FOR GENERAL ADULT MEDICAL EXAMINATION W/OUT ABNORMAL FINDINGS: ICD-10-CM

## 2023-11-29 DIAGNOSIS — N20.0 CALCULUS OF KIDNEY: Chronic | ICD-10-CM

## 2023-11-29 DIAGNOSIS — Z98.891 HISTORY OF UTERINE SCAR FROM PREVIOUS SURGERY: Chronic | ICD-10-CM

## 2023-11-29 DIAGNOSIS — Z23 ENCOUNTER FOR IMMUNIZATION: ICD-10-CM

## 2023-11-29 DIAGNOSIS — M54.50 LOW BACK PAIN, UNSPECIFIED: ICD-10-CM

## 2023-11-29 DIAGNOSIS — E66.9 OBESITY, UNSPECIFIED: ICD-10-CM

## 2023-11-29 PROCEDURE — 99204 OFFICE O/P NEW MOD 45 MIN: CPT | Mod: GC

## 2023-11-29 RX ORDER — ACETAMINOPHEN 500 MG/1
500 TABLET, COATED ORAL
Qty: 60 | Refills: 1 | Status: ACTIVE | COMMUNITY
Start: 2023-11-29 | End: 1900-01-01

## 2023-12-04 DIAGNOSIS — Z00.00 ENCOUNTER FOR GENERAL ADULT MEDICAL EXAMINATION WITHOUT ABNORMAL FINDINGS: ICD-10-CM

## 2023-12-04 DIAGNOSIS — Z23 ENCOUNTER FOR IMMUNIZATION: ICD-10-CM

## 2023-12-04 DIAGNOSIS — E66.9 OBESITY, UNSPECIFIED: ICD-10-CM

## 2023-12-04 DIAGNOSIS — M54.50 LOW BACK PAIN, UNSPECIFIED: ICD-10-CM

## 2023-12-04 DIAGNOSIS — G44.209 TENSION-TYPE HEADACHE, UNSPECIFIED, NOT INTRACTABLE: ICD-10-CM

## 2023-12-04 LAB
ALBUMIN SERPL ELPH-MCNC: 4.6 G/DL
ALP BLD-CCNC: 79 U/L
ALT SERPL-CCNC: 26 U/L
ANION GAP SERPL CALC-SCNC: 17 MMOL/L
AST SERPL-CCNC: 17 U/L
BASOPHILS # BLD AUTO: 0.04 K/UL
BASOPHILS NFR BLD AUTO: 0.4 %
BILIRUB SERPL-MCNC: 0.2 MG/DL
BUN SERPL-MCNC: 17 MG/DL
CALCIUM SERPL-MCNC: 9.4 MG/DL
CHLORIDE SERPL-SCNC: 106 MMOL/L
CHOLEST SERPL-MCNC: 158 MG/DL
CO2 SERPL-SCNC: 19 MMOL/L
CREAT SERPL-MCNC: 0.89 MG/DL
EGFR: 88 ML/MIN/1.73M2
EOSINOPHIL # BLD AUTO: 0.29 K/UL
EOSINOPHIL NFR BLD AUTO: 2.6 %
ESTIMATED AVERAGE GLUCOSE: 117 MG/DL
GLUCOSE SERPL-MCNC: 97 MG/DL
HBA1C MFR BLD HPLC: 5.7 %
HCT VFR BLD CALC: 40.9 %
HDLC SERPL-MCNC: 45 MG/DL
HGB BLD-MCNC: 13.1 G/DL
IMM GRANULOCYTES NFR BLD AUTO: 0.4 %
LDLC SERPL CALC-MCNC: 85 MG/DL
LYMPHOCYTES # BLD AUTO: 3.65 K/UL
LYMPHOCYTES NFR BLD AUTO: 32.8 %
MAGNESIUM SERPL-MCNC: 2.2 MG/DL
MAN DIFF?: NORMAL
MCHC RBC-ENTMCNC: 29.7 PG
MCHC RBC-ENTMCNC: 32 GM/DL
MCV RBC AUTO: 92.7 FL
MONOCYTES # BLD AUTO: 0.59 K/UL
MONOCYTES NFR BLD AUTO: 5.3 %
NEUTROPHILS # BLD AUTO: 6.51 K/UL
NEUTROPHILS NFR BLD AUTO: 58.5 %
NONHDLC SERPL-MCNC: 112 MG/DL
PHOSPHATE SERPL-MCNC: 3.7 MG/DL
PLATELET # BLD AUTO: 248 K/UL
POTASSIUM SERPL-SCNC: 4.1 MMOL/L
PROT SERPL-MCNC: 7.4 G/DL
RBC # BLD: 4.41 M/UL
RBC # FLD: 13.2 %
SODIUM SERPL-SCNC: 142 MMOL/L
TRIGL SERPL-MCNC: 161 MG/DL
TSH SERPL-ACNC: 1.53 UIU/ML
WBC # FLD AUTO: 11.12 K/UL

## 2024-02-07 ENCOUNTER — APPOINTMENT (OUTPATIENT)
Dept: INTERNAL MEDICINE | Facility: CLINIC | Age: 34
End: 2024-02-07

## 2024-04-09 NOTE — PROGRESS NOTE ADULT - COVID-19 NEGATIVE LAB RESULT
Advance Care Planning     Advance Care Planning Activator (Inpatient)  Conversation Note      Date of ACP Conversation: 4/9/2024         ACP Activator: Karina Hull RN    {When Decision Maker makes decisions on behalf of the incapacitated patient: Decision Maker is asked to consider and make decisions based on patient values, known preferences, or best interests.     Health Care Decision Maker: NO LW/HCPOA on file. Spouse is legal NOK unless document completed or presented stating otherwise.     Current Designated Health Care Decision Maker:     Primary Decision Maker: Danii Baumann - Spouse - [phone]  Click here to complete Healthcare Decision Makers including section of the Healthcare Decision Maker Relationship (ie \"Primary\")  Today we documented Decision Maker(s) consistent with Legal Next of Kin hierarchy.    Care Preferences     Full code per MD orders.     
COVID-19 ruled out
COVID-19 ruled out

## 2024-09-09 NOTE — ASU PREOP CHECKLIST - WEIGHT IN KG
How Many Skin Cancers Have You Had?: more than one What Is The Reason For Today's Visit?: History of Non-Melanoma Skin Cancer Additional History: Patient has dry and scaly patches on her left temple she would like to be examined. When Was Your Last Cancer Diagnosed?: 2023 63.5

## 2025-01-23 NOTE — ED CDU PROVIDER INITIAL DAY NOTE - OBJECTIVE STATEMENT
PCT at bedside. Pt confused but pleasant. VS done. Will continue to monitor.    28yo F 28 yo female with h/o uncomplicated  2 weeks ago, gallstones, pyelonephritis 2 months ago while pregnant requiring admission, p/w 1 week of RUQ pain radiating to back, worse after eating food, associated with nausea and vomiting.  No SOB, coughing, fevers, dysuria, or other complaints.  Patient denies any left sided abdominal, flank, or back pains. Says she has had these same symptoms while she was pregnant and hospitalized prior to her  and was told it was due to her gallstones.      In ED patient received IVF, morphine IV x 2, and dilaudid with improved but persistent pain.  An abdominal ultrasound and CT abdomen/pelvis was performed which showed gallstones without cholecystitis, a 9mm obstructing renal stone in the left distal ureter at the pelvic brim with mild left hydronephrosis and possible left sided pyelonephritis.  UA showed 10WBCs and negative nitrites. Urology was consulted to evaluate for possible surgical intervention given large size of obstructing stone.  Per urology reccs, no acute surgical intervention needed at this time and pain patient experiencing likely due to gallbladder disease and gallbladder on right and obstructing renal stone on left where patient has no pain.     Patient dispo-ed to CDU for pain management, HIDA scan, IV antibiotics, urine culture to follow, and general observation if worsening or new pain relating to renal stone.

## 2025-06-15 NOTE — ED ADULT TRIAGE NOTE - CHIEF COMPLAINT QUOTE
English
Pt AOX4  approx 8-10 weeks pregnant c/o vaginal bleeding and cramping; pt is seen in our OB clinic, was told pregnancy is not viable and is scheduled for D&C on the ; started noting brownish blood 2 days ago, told to come to ER; Hx of    G2 T1  L1  Int Diane # [id number]

## 2025-06-26 NOTE — OB RN INTRAOPERATIVE NOTE - NS_INTERMITTENTPNEUMATICCOMPRESSIONSTART_OBGYN_ALL_OB_DT
Quality 130: Documentation Of Current Medications In The Medical Record: Current Medications Documented Quality 431: Preventive Care And Screening: Unhealthy Alcohol Use - Screening: Patient not screened for unhealthy alcohol use using a systematic screening method Quality 226: Preventive Care And Screening: Tobacco Use: Screening And Cessation Intervention: Patient screened for tobacco use and is an ex/non-smoker Detail Level: Detailed 22-Jun-2020 10:15

## (undated) DEVICE — POSITIONER STRAP ARMBOARD VELCRO TS-30

## (undated) DEVICE — DRSG PAD SANITARY OB

## (undated) DEVICE — VENODYNE/SCD SLEEVE CALF MEDIUM

## (undated) DEVICE — PROTECTOR HEEL / ELBOW FLUFFY

## (undated) DEVICE — DRSG TELFA 3 X 8

## (undated) DEVICE — GOWN XL

## (undated) DEVICE — PREP BETADINE SPONGE STICKS

## (undated) DEVICE — VISITEC 4X4

## (undated) DEVICE — SOL IRR POUR H2O 500ML

## (undated) DEVICE — VACUUM CURETTE BUSSE HOSP CURVED 7MM

## (undated) DEVICE — TISSUE TRAP BERKELEY SAFE TOUCH

## (undated) DEVICE — GOWN LG

## (undated) DEVICE — DRAPE TOWEL BLUE 17" X 24"

## (undated) DEVICE — DRAPE LIGHT HANDLE COVER (GREEN)

## (undated) DEVICE — BASIN SET DOUBLE

## (undated) DEVICE — LABELS BLANK W PEN

## (undated) DEVICE — BOTTLE COLLECTION KIT CAP 3SM 2 LG

## (undated) DEVICE — VACUUM CURETTE BUSSE HOSP CURVED 8MM

## (undated) DEVICE — PACK PERI GYN

## (undated) DEVICE — TUBING GYRUS ACMI COLLECTION SET 6FT

## (undated) DEVICE — POSITIONER PINK PAD PIGAZZI SYSTEM

## (undated) DEVICE — WARMING BLANKET FULL ADULT

## (undated) DEVICE — GLV 7 PROTEXIS (CREAM) MICRO

## (undated) DEVICE — VACUUM CURETTE BERKLEY OLYMPUS CURVED 9MM